# Patient Record
Sex: MALE | Race: WHITE | Employment: FULL TIME | URBAN - NONMETROPOLITAN AREA
[De-identification: names, ages, dates, MRNs, and addresses within clinical notes are randomized per-mention and may not be internally consistent; named-entity substitution may affect disease eponyms.]

---

## 2017-02-06 DIAGNOSIS — I10 ESSENTIAL HYPERTENSION: ICD-10-CM

## 2017-02-06 RX ORDER — OLMESARTAN MEDOXOMIL 40 MG/1
40 TABLET ORAL DAILY
Qty: 90 TABLET | Refills: 0 | Status: SHIPPED | OUTPATIENT
Start: 2017-02-06 | End: 2017-02-16 | Stop reason: SDUPTHER

## 2017-02-06 RX ORDER — AMLODIPINE BESYLATE 5 MG/1
5 TABLET ORAL DAILY
Qty: 90 TABLET | Refills: 0 | Status: SHIPPED | OUTPATIENT
Start: 2017-02-06 | End: 2017-02-16 | Stop reason: SDUPTHER

## 2017-02-06 RX ORDER — CHLORTHALIDONE 25 MG/1
TABLET ORAL
Qty: 45 TABLET | Refills: 0 | Status: SHIPPED | OUTPATIENT
Start: 2017-02-06 | End: 2017-02-16 | Stop reason: SDUPTHER

## 2017-02-16 ENCOUNTER — OFFICE VISIT (OUTPATIENT)
Dept: FAMILY MEDICINE CLINIC | Age: 57
End: 2017-02-16
Payer: COMMERCIAL

## 2017-02-16 VITALS
DIASTOLIC BLOOD PRESSURE: 78 MMHG | RESPIRATION RATE: 16 BRPM | SYSTOLIC BLOOD PRESSURE: 128 MMHG | OXYGEN SATURATION: 98 % | TEMPERATURE: 99 F | WEIGHT: 206 LBS | HEART RATE: 86 BPM | BODY MASS INDEX: 33.11 KG/M2 | HEIGHT: 66 IN

## 2017-02-16 DIAGNOSIS — N52.1 ERECTILE DYSFUNCTION DUE TO DISEASES CLASSIFIED ELSEWHERE: ICD-10-CM

## 2017-02-16 DIAGNOSIS — E78.2 MIXED HYPERLIPIDEMIA: ICD-10-CM

## 2017-02-16 DIAGNOSIS — Z12.5 PROSTATE CANCER SCREENING: ICD-10-CM

## 2017-02-16 DIAGNOSIS — Z28.21 REFUSED PNEUMOCOCCAL VACCINATION: ICD-10-CM

## 2017-02-16 DIAGNOSIS — Z72.0 TOBACCO ABUSE: ICD-10-CM

## 2017-02-16 DIAGNOSIS — I10 ESSENTIAL HYPERTENSION: ICD-10-CM

## 2017-02-16 DIAGNOSIS — Z28.21 REFUSED INFLUENZA VACCINE: ICD-10-CM

## 2017-02-16 DIAGNOSIS — E78.2 MIXED HYPERLIPIDEMIA: Primary | ICD-10-CM

## 2017-02-16 DIAGNOSIS — K21.00 GASTROESOPHAGEAL REFLUX DISEASE WITH ESOPHAGITIS: ICD-10-CM

## 2017-02-16 DIAGNOSIS — Z13.9 SCREENING: ICD-10-CM

## 2017-02-16 LAB
ALBUMIN SERPL-MCNC: 4.3 G/DL (ref 3.5–5.2)
ALP BLD-CCNC: 88 U/L (ref 40–130)
ALT SERPL-CCNC: 27 U/L (ref 5–41)
ANION GAP SERPL CALCULATED.3IONS-SCNC: 15 MMOL/L (ref 7–19)
AST SERPL-CCNC: 21 U/L (ref 5–40)
BASOPHILS ABSOLUTE: 0.1 K/UL (ref 0–0.2)
BASOPHILS RELATIVE PERCENT: 0.9 % (ref 0–1)
BILIRUB SERPL-MCNC: 0.3 MG/DL (ref 0.2–1.2)
BILIRUBIN URINE: NEGATIVE
BLOOD, URINE: NEGATIVE
BUN BLDV-MCNC: 14 MG/DL (ref 6–20)
CALCIUM SERPL-MCNC: 9.7 MG/DL (ref 8.6–10)
CHLORIDE BLD-SCNC: 103 MMOL/L (ref 98–111)
CHOLESTEROL, TOTAL: 182 MG/DL (ref 160–199)
CLARITY: CLEAR
CO2: 26 MMOL/L (ref 22–29)
COLOR: YELLOW
CREAT SERPL-MCNC: 0.8 MG/DL (ref 0.5–1.2)
EOSINOPHILS ABSOLUTE: 0.3 K/UL (ref 0–0.6)
EOSINOPHILS RELATIVE PERCENT: 3.8 % (ref 0–5)
GFR NON-AFRICAN AMERICAN: >60
GLOBULIN: 2.6 G/DL
GLUCOSE BLD-MCNC: 94 MG/DL (ref 74–109)
GLUCOSE URINE: NEGATIVE MG/DL
HCT VFR BLD CALC: 45.3 % (ref 42–52)
HDLC SERPL-MCNC: 38 MG/DL (ref 55–121)
HEMOGLOBIN: 15.7 G/DL (ref 14–18)
KETONES, URINE: NEGATIVE MG/DL
LDL CHOLESTEROL CALCULATED: 119 MG/DL
LEUKOCYTE ESTERASE, URINE: NEGATIVE
LYMPHOCYTES ABSOLUTE: 3 K/UL (ref 1.1–4.5)
LYMPHOCYTES RELATIVE PERCENT: 33.6 % (ref 20–40)
MCH RBC QN AUTO: 32.3 PG (ref 27–31)
MCHC RBC AUTO-ENTMCNC: 34.7 G/DL (ref 33–37)
MCV RBC AUTO: 93.2 FL (ref 80–94)
MONOCYTES ABSOLUTE: 0.7 K/UL (ref 0–0.9)
MONOCYTES RELATIVE PERCENT: 7.4 % (ref 0–10)
NEUTROPHILS ABSOLUTE: 4.9 K/UL (ref 1.5–7.5)
NEUTROPHILS RELATIVE PERCENT: 54.3 % (ref 50–65)
NITRITE, URINE: NEGATIVE
PDW BLD-RTO: 13.2 % (ref 11.5–14.5)
PH UA: 6
PLATELET # BLD: 204 K/UL (ref 130–400)
PMV BLD AUTO: 11.6 FL (ref 7.4–10.4)
POTASSIUM SERPL-SCNC: 3.8 MMOL/L (ref 3.5–5)
PROTEIN UA: NEGATIVE MG/DL
RBC # BLD: 4.86 M/UL (ref 4.7–6.1)
SODIUM BLD-SCNC: 144 MMOL/L (ref 136–145)
SPECIFIC GRAVITY UA: 1.02
TOTAL PROTEIN: 6.9 G/DL (ref 6.6–8.7)
TRIGL SERPL-MCNC: 123 MG/DL (ref 150–199)
TSH SERPL DL<=0.05 MIU/L-ACNC: 1.03 UIU/ML (ref 0.27–4.2)
UROBILINOGEN, URINE: 0.2 E.U./DL
WBC # BLD: 9 K/UL (ref 4.8–10.8)

## 2017-02-16 PROCEDURE — 99214 OFFICE O/P EST MOD 30 MIN: CPT | Performed by: NURSE PRACTITIONER

## 2017-02-16 RX ORDER — CHLORTHALIDONE 25 MG/1
TABLET ORAL
Qty: 45 TABLET | Refills: 1 | Status: SHIPPED | OUTPATIENT
Start: 2017-02-16 | End: 2017-09-11 | Stop reason: SDUPTHER

## 2017-02-16 RX ORDER — VARENICLINE TARTRATE 25 MG
KIT ORAL
Qty: 1 EACH | Refills: 0 | Status: SHIPPED | OUTPATIENT
Start: 2017-02-16 | End: 2017-09-11

## 2017-02-16 RX ORDER — VARENICLINE TARTRATE 1 MG/1
1 TABLET, FILM COATED ORAL 2 TIMES DAILY
Qty: 60 TABLET | Refills: 3 | Status: SHIPPED | OUTPATIENT
Start: 2017-02-16 | End: 2017-09-11

## 2017-02-16 RX ORDER — OMEPRAZOLE 20 MG/1
20 CAPSULE, DELAYED RELEASE ORAL
Qty: 90 CAPSULE | Refills: 1 | Status: SHIPPED | OUTPATIENT
Start: 2017-02-16 | End: 2017-09-11 | Stop reason: SDUPTHER

## 2017-02-16 RX ORDER — TADALAFIL 20 MG/1
TABLET ORAL
Qty: 10 TABLET | Refills: 3 | Status: SHIPPED | OUTPATIENT
Start: 2017-02-16 | End: 2017-09-11

## 2017-02-16 RX ORDER — OLMESARTAN MEDOXOMIL 40 MG/1
40 TABLET ORAL DAILY
Qty: 90 TABLET | Refills: 1 | Status: SHIPPED | OUTPATIENT
Start: 2017-02-16 | End: 2017-09-11 | Stop reason: SDUPTHER

## 2017-02-16 RX ORDER — AMLODIPINE BESYLATE 5 MG/1
5 TABLET ORAL DAILY
Qty: 90 TABLET | Refills: 1 | Status: SHIPPED | OUTPATIENT
Start: 2017-02-16 | End: 2017-09-11 | Stop reason: SDUPTHER

## 2017-02-16 ASSESSMENT — ENCOUNTER SYMPTOMS
COUGH: 0
SHORTNESS OF BREATH: 0

## 2017-02-22 DIAGNOSIS — Z12.5 PROSTATE CANCER SCREENING: Primary | ICD-10-CM

## 2017-02-23 DIAGNOSIS — Z12.5 PROSTATE CANCER SCREENING: ICD-10-CM

## 2017-02-23 LAB — PROSTATE SPECIFIC ANTIGEN: 1.05 NG/ML (ref 0–4)

## 2017-03-06 ENCOUNTER — TELEPHONE (OUTPATIENT)
Dept: GASTROENTEROLOGY | Age: 57
End: 2017-03-06

## 2017-05-04 DIAGNOSIS — I10 ESSENTIAL HYPERTENSION: ICD-10-CM

## 2017-05-04 RX ORDER — CHLORTHALIDONE 25 MG/1
TABLET ORAL
Qty: 45 TABLET | Refills: 0 | Status: SHIPPED | OUTPATIENT
Start: 2017-05-04 | End: 2017-09-11 | Stop reason: SDUPTHER

## 2017-06-19 ENCOUNTER — ANESTHESIA (OUTPATIENT)
Dept: ENDOSCOPY | Age: 57
End: 2017-06-19
Payer: COMMERCIAL

## 2017-06-19 ENCOUNTER — HOSPITAL ENCOUNTER (OUTPATIENT)
Age: 57
Setting detail: OUTPATIENT SURGERY
Discharge: HOME | End: 2017-06-19
Attending: INTERNAL MEDICINE | Admitting: INTERNAL MEDICINE
Payer: COMMERCIAL

## 2017-06-19 ENCOUNTER — ANESTHESIA EVENT (OUTPATIENT)
Dept: ENDOSCOPY | Age: 57
End: 2017-06-19
Payer: COMMERCIAL

## 2017-06-19 VITALS
RESPIRATION RATE: 14 BRPM | TEMPERATURE: 98.3 F | DIASTOLIC BLOOD PRESSURE: 79 MMHG | OXYGEN SATURATION: 93 % | SYSTOLIC BLOOD PRESSURE: 128 MMHG | HEART RATE: 76 BPM

## 2017-06-19 VITALS
OXYGEN SATURATION: 93 % | DIASTOLIC BLOOD PRESSURE: 67 MMHG | RESPIRATION RATE: 15 BRPM | SYSTOLIC BLOOD PRESSURE: 104 MMHG

## 2017-06-19 PROCEDURE — 7100000010 HC PHASE II RECOVERY - FIRST 15 MIN: Performed by: INTERNAL MEDICINE

## 2017-06-19 PROCEDURE — 3700000001 HC ADD 15 MINUTES (ANESTHESIA): Performed by: INTERNAL MEDICINE

## 2017-06-19 PROCEDURE — 2580000003 HC RX 258: Performed by: INTERNAL MEDICINE

## 2017-06-19 PROCEDURE — 6360000002 HC RX W HCPCS: Performed by: NURSE ANESTHETIST, CERTIFIED REGISTERED

## 2017-06-19 PROCEDURE — 7100000011 HC PHASE II RECOVERY - ADDTL 15 MIN: Performed by: INTERNAL MEDICINE

## 2017-06-19 PROCEDURE — 88305 TISSUE EXAM BY PATHOLOGIST: CPT

## 2017-06-19 PROCEDURE — 2500000003 HC RX 250 WO HCPCS: Performed by: NURSE ANESTHETIST, CERTIFIED REGISTERED

## 2017-06-19 PROCEDURE — 3609010300 HC COLONOSCOPY W/BIOPSY SINGLE/MULTIPLE: Performed by: INTERNAL MEDICINE

## 2017-06-19 PROCEDURE — 3700000000 HC ANESTHESIA ATTENDED CARE: Performed by: INTERNAL MEDICINE

## 2017-06-19 PROCEDURE — 45380 COLONOSCOPY AND BIOPSY: CPT | Performed by: INTERNAL MEDICINE

## 2017-06-19 RX ORDER — LIDOCAINE HYDROCHLORIDE 10 MG/ML
5 INJECTION, SOLUTION EPIDURAL; INFILTRATION; INTRACAUDAL; PERINEURAL ONCE
Status: DISCONTINUED | OUTPATIENT
Start: 2017-06-19 | End: 2017-06-19 | Stop reason: HOSPADM

## 2017-06-19 RX ORDER — LIDOCAINE HYDROCHLORIDE 20 MG/ML
INJECTION, SOLUTION INFILTRATION; PERINEURAL PRN
Status: DISCONTINUED | OUTPATIENT
Start: 2017-06-19 | End: 2017-06-19 | Stop reason: SDUPTHER

## 2017-06-19 RX ORDER — PROPOFOL 10 MG/ML
INJECTION, EMULSION INTRAVENOUS PRN
Status: DISCONTINUED | OUTPATIENT
Start: 2017-06-19 | End: 2017-06-19 | Stop reason: SDUPTHER

## 2017-06-19 RX ORDER — PROMETHAZINE HYDROCHLORIDE 25 MG/ML
6.25 INJECTION, SOLUTION INTRAMUSCULAR; INTRAVENOUS
Status: DISCONTINUED | OUTPATIENT
Start: 2017-06-19 | End: 2017-06-19 | Stop reason: HOSPADM

## 2017-06-19 RX ORDER — SODIUM CHLORIDE 9 MG/ML
INJECTION, SOLUTION INTRAVENOUS CONTINUOUS
Status: DISCONTINUED | OUTPATIENT
Start: 2017-06-19 | End: 2017-06-19 | Stop reason: HOSPADM

## 2017-06-19 RX ORDER — MIDAZOLAM HYDROCHLORIDE 1 MG/ML
INJECTION INTRAMUSCULAR; INTRAVENOUS PRN
Status: DISCONTINUED | OUTPATIENT
Start: 2017-06-19 | End: 2017-06-19 | Stop reason: SDUPTHER

## 2017-06-19 RX ORDER — FENTANYL CITRATE 50 UG/ML
INJECTION, SOLUTION INTRAMUSCULAR; INTRAVENOUS PRN
Status: DISCONTINUED | OUTPATIENT
Start: 2017-06-19 | End: 2017-06-19 | Stop reason: SDUPTHER

## 2017-06-19 RX ORDER — DIPHENHYDRAMINE HYDROCHLORIDE 50 MG/ML
12.5 INJECTION INTRAMUSCULAR; INTRAVENOUS
Status: DISCONTINUED | OUTPATIENT
Start: 2017-06-19 | End: 2017-06-19 | Stop reason: HOSPADM

## 2017-06-19 RX ORDER — ONDANSETRON 2 MG/ML
4 INJECTION INTRAMUSCULAR; INTRAVENOUS
Status: DISCONTINUED | OUTPATIENT
Start: 2017-06-19 | End: 2017-06-19 | Stop reason: HOSPADM

## 2017-06-19 RX ADMIN — SODIUM CHLORIDE: 9 INJECTION, SOLUTION INTRAVENOUS at 10:34

## 2017-06-19 RX ADMIN — PROPOFOL 200 MG: 10 INJECTION, EMULSION INTRAVENOUS at 11:36

## 2017-06-19 RX ADMIN — LIDOCAINE HYDROCHLORIDE 40 MG: 20 INJECTION, SOLUTION INFILTRATION; PERINEURAL at 11:36

## 2017-06-19 RX ADMIN — FENTANYL CITRATE 50 MCG: 50 INJECTION INTRAMUSCULAR; INTRAVENOUS at 11:36

## 2017-06-19 RX ADMIN — MIDAZOLAM HYDROCHLORIDE 2 MG: 1 INJECTION, SOLUTION INTRAMUSCULAR; INTRAVENOUS at 11:36

## 2017-06-19 ASSESSMENT — ENCOUNTER SYMPTOMS: SHORTNESS OF BREATH: 1

## 2017-09-11 ENCOUNTER — OFFICE VISIT (OUTPATIENT)
Dept: FAMILY MEDICINE CLINIC | Age: 57
End: 2017-09-11
Payer: COMMERCIAL

## 2017-09-11 VITALS
WEIGHT: 211 LBS | HEIGHT: 66 IN | OXYGEN SATURATION: 98 % | DIASTOLIC BLOOD PRESSURE: 80 MMHG | SYSTOLIC BLOOD PRESSURE: 130 MMHG | HEART RATE: 73 BPM | RESPIRATION RATE: 18 BRPM | BODY MASS INDEX: 33.91 KG/M2 | TEMPERATURE: 98 F

## 2017-09-11 DIAGNOSIS — I10 ESSENTIAL HYPERTENSION: ICD-10-CM

## 2017-09-11 DIAGNOSIS — E78.2 MIXED HYPERLIPIDEMIA: ICD-10-CM

## 2017-09-11 DIAGNOSIS — K21.00 GASTROESOPHAGEAL REFLUX DISEASE WITH ESOPHAGITIS: ICD-10-CM

## 2017-09-11 LAB
ALBUMIN SERPL-MCNC: 4.3 G/DL (ref 3.5–5.2)
ALP BLD-CCNC: 91 U/L (ref 40–130)
ALT SERPL-CCNC: 28 U/L (ref 5–41)
ANION GAP SERPL CALCULATED.3IONS-SCNC: 14 MMOL/L (ref 7–19)
AST SERPL-CCNC: 21 U/L (ref 5–40)
BASOPHILS ABSOLUTE: 0.1 K/UL (ref 0–0.2)
BASOPHILS RELATIVE PERCENT: 1 % (ref 0–1)
BILIRUB SERPL-MCNC: 0.5 MG/DL (ref 0.2–1.2)
BILIRUBIN URINE: NEGATIVE
BLOOD, URINE: NEGATIVE
BUN BLDV-MCNC: 19 MG/DL (ref 6–20)
CALCIUM SERPL-MCNC: 10 MG/DL (ref 8.6–10)
CHLORIDE BLD-SCNC: 103 MMOL/L (ref 98–111)
CHOLESTEROL, TOTAL: 131 MG/DL (ref 160–199)
CLARITY: CLEAR
CO2: 27 MMOL/L (ref 22–29)
COLOR: YELLOW
CREAT SERPL-MCNC: 0.8 MG/DL (ref 0.5–1.2)
EOSINOPHILS ABSOLUTE: 0.3 K/UL (ref 0–0.6)
EOSINOPHILS RELATIVE PERCENT: 3.4 % (ref 0–5)
GFR NON-AFRICAN AMERICAN: >60
GLUCOSE BLD-MCNC: 98 MG/DL (ref 74–109)
GLUCOSE URINE: NEGATIVE MG/DL
HCT VFR BLD CALC: 46.2 % (ref 42–52)
HDLC SERPL-MCNC: 41 MG/DL (ref 55–121)
HEMOGLOBIN: 16.2 G/DL (ref 14–18)
KETONES, URINE: NEGATIVE MG/DL
LDL CHOLESTEROL CALCULATED: 75 MG/DL
LEUKOCYTE ESTERASE, URINE: NEGATIVE
LYMPHOCYTES ABSOLUTE: 3.2 K/UL (ref 1.1–4.5)
LYMPHOCYTES RELATIVE PERCENT: 33.5 % (ref 20–40)
MCH RBC QN AUTO: 33.1 PG (ref 27–31)
MCHC RBC AUTO-ENTMCNC: 35.1 G/DL (ref 33–37)
MCV RBC AUTO: 94.3 FL (ref 80–94)
MONOCYTES ABSOLUTE: 0.7 K/UL (ref 0–0.9)
MONOCYTES RELATIVE PERCENT: 7.6 % (ref 0–10)
NEUTROPHILS ABSOLUTE: 5.2 K/UL (ref 1.5–7.5)
NEUTROPHILS RELATIVE PERCENT: 54.2 % (ref 50–65)
NITRITE, URINE: NEGATIVE
PDW BLD-RTO: 13.3 % (ref 11.5–14.5)
PH UA: 7.5
PLATELET # BLD: 189 K/UL (ref 130–400)
PMV BLD AUTO: 10.7 FL (ref 9.4–12.4)
POTASSIUM SERPL-SCNC: 4.2 MMOL/L (ref 3.5–5)
PROTEIN UA: NEGATIVE MG/DL
RBC # BLD: 4.9 M/UL (ref 4.7–6.1)
SODIUM BLD-SCNC: 144 MMOL/L (ref 136–145)
SPECIFIC GRAVITY UA: 1.02
TOTAL PROTEIN: 7.6 G/DL (ref 6.6–8.7)
TRIGL SERPL-MCNC: 76 MG/DL (ref 150–199)
TSH SERPL DL<=0.05 MIU/L-ACNC: 0.92 UIU/ML (ref 0.27–4.2)
UROBILINOGEN, URINE: 1 E.U./DL
WBC # BLD: 9.6 K/UL (ref 4.8–10.8)

## 2017-09-11 PROCEDURE — 99214 OFFICE O/P EST MOD 30 MIN: CPT | Performed by: NURSE PRACTITIONER

## 2017-09-11 RX ORDER — AMLODIPINE BESYLATE 5 MG/1
5 TABLET ORAL DAILY
Qty: 90 TABLET | Refills: 1 | Status: SHIPPED | OUTPATIENT
Start: 2017-09-11 | End: 2018-02-09 | Stop reason: SDUPTHER

## 2017-09-11 RX ORDER — OLMESARTAN MEDOXOMIL 40 MG/1
40 TABLET ORAL DAILY
Qty: 90 TABLET | Refills: 1 | Status: SHIPPED | OUTPATIENT
Start: 2017-09-11 | End: 2018-02-09 | Stop reason: SDUPTHER

## 2017-09-11 RX ORDER — OMEPRAZOLE 20 MG/1
20 CAPSULE, DELAYED RELEASE ORAL
Qty: 90 CAPSULE | Refills: 1 | Status: SHIPPED | OUTPATIENT
Start: 2017-09-11 | End: 2018-02-09 | Stop reason: SDUPTHER

## 2017-09-11 RX ORDER — CHLORTHALIDONE 25 MG/1
TABLET ORAL
Qty: 45 TABLET | Refills: 1 | Status: SHIPPED | OUTPATIENT
Start: 2017-09-11 | End: 2018-02-09 | Stop reason: SDUPTHER

## 2017-09-11 RX ORDER — ATORVASTATIN CALCIUM 40 MG/1
TABLET, FILM COATED ORAL
Qty: 90 TABLET | Refills: 1 | Status: SHIPPED | OUTPATIENT
Start: 2017-09-11 | End: 2018-02-09 | Stop reason: SDUPTHER

## 2017-09-11 ASSESSMENT — ENCOUNTER SYMPTOMS
DIARRHEA: 0
CONSTIPATION: 0
SHORTNESS OF BREATH: 0

## 2017-10-03 ENCOUNTER — OFFICE VISIT (OUTPATIENT)
Dept: FAMILY MEDICINE CLINIC | Age: 57
End: 2017-10-03
Payer: COMMERCIAL

## 2017-10-03 VITALS
DIASTOLIC BLOOD PRESSURE: 74 MMHG | SYSTOLIC BLOOD PRESSURE: 130 MMHG | HEART RATE: 88 BPM | OXYGEN SATURATION: 98 % | TEMPERATURE: 97.9 F | RESPIRATION RATE: 16 BRPM

## 2017-10-03 DIAGNOSIS — M54.41 ACUTE RIGHT-SIDED LOW BACK PAIN WITH RIGHT-SIDED SCIATICA: Primary | ICD-10-CM

## 2017-10-03 PROCEDURE — 99213 OFFICE O/P EST LOW 20 MIN: CPT | Performed by: NURSE PRACTITIONER

## 2017-10-03 PROCEDURE — 96372 THER/PROPH/DIAG INJ SC/IM: CPT | Performed by: NURSE PRACTITIONER

## 2017-10-03 RX ORDER — METHYLPREDNISOLONE 4 MG/1
TABLET ORAL
Qty: 1 KIT | Refills: 0 | Status: SHIPPED | OUTPATIENT
Start: 2017-10-03 | End: 2017-10-06 | Stop reason: ALTCHOICE

## 2017-10-03 RX ORDER — DEXAMETHASONE SODIUM PHOSPHATE 4 MG/ML
2 INJECTION, SOLUTION INTRA-ARTICULAR; INTRALESIONAL; INTRAMUSCULAR; INTRAVENOUS; SOFT TISSUE ONCE
Status: COMPLETED | OUTPATIENT
Start: 2017-10-03 | End: 2017-10-03

## 2017-10-03 RX ORDER — HYDROCODONE BITARTRATE AND ACETAMINOPHEN 7.5; 325 MG/1; MG/1
1 TABLET ORAL EVERY 6 HOURS PRN
Qty: 30 TABLET | Refills: 0 | Status: SHIPPED | OUTPATIENT
Start: 2017-10-03 | End: 2017-10-06

## 2017-10-03 RX ORDER — IBUPROFEN 400 MG/1
400 TABLET ORAL EVERY 6 HOURS PRN
COMMUNITY
End: 2017-10-06 | Stop reason: ALTCHOICE

## 2017-10-03 RX ORDER — TRIAMCINOLONE ACETONIDE 40 MG/ML
80 INJECTION, SUSPENSION INTRA-ARTICULAR; INTRAMUSCULAR ONCE
Status: COMPLETED | OUTPATIENT
Start: 2017-10-03 | End: 2017-10-03

## 2017-10-03 RX ORDER — CYCLOBENZAPRINE HCL 10 MG
10 TABLET ORAL 3 TIMES DAILY PRN
Qty: 60 TABLET | Refills: 1 | Status: SHIPPED | OUTPATIENT
Start: 2017-10-03 | End: 2017-10-13

## 2017-10-03 RX ORDER — NABUMETONE 500 MG/1
500 TABLET, FILM COATED ORAL 2 TIMES DAILY
Qty: 60 TABLET | Refills: 3 | Status: SHIPPED | OUTPATIENT
Start: 2017-10-03 | End: 2017-11-02

## 2017-10-03 RX ORDER — CYCLOBENZAPRINE HCL 10 MG
10 TABLET ORAL 3 TIMES DAILY PRN
COMMUNITY
End: 2017-10-06 | Stop reason: SDUPTHER

## 2017-10-03 RX ADMIN — TRIAMCINOLONE ACETONIDE 80 MG: 40 INJECTION, SUSPENSION INTRA-ARTICULAR; INTRAMUSCULAR at 09:25

## 2017-10-03 RX ADMIN — DEXAMETHASONE SODIUM PHOSPHATE 2 MG: 4 INJECTION, SOLUTION INTRA-ARTICULAR; INTRALESIONAL; INTRAMUSCULAR; INTRAVENOUS; SOFT TISSUE at 09:24

## 2017-10-03 ASSESSMENT — ENCOUNTER SYMPTOMS
BACK PAIN: 1
DIARRHEA: 0

## 2017-10-03 NOTE — PATIENT INSTRUCTIONS
between the ice pack and your skin. · Take pain medicines exactly as directed. ¨ If the doctor gave you a prescription medicine for pain, take it as prescribed. ¨ If you are not taking a prescription pain medicine, ask your doctor if you can take an over-the-counter medicine. · Take short walks several times a day. You can start with 5 to 10 minutes, 3 or 4 times a day, and work up to longer walks. Walk on level surfaces and avoid hills and stairs until your back is better. · Return to work and other activities as soon as you can. Continued rest without activity is usually not good for your back. · To prevent future back pain, do exercises to stretch and strengthen your back and stomach. Learn how to use good posture, safe lifting techniques, and proper body mechanics. When should you call for help? Call your doctor now or seek immediate medical care if:  · You have new or worsening numbness in your legs. · You have new or worsening weakness in your legs. (This could make it hard to stand up.)  · You lose control of your bladder or bowels. Watch closely for changes in your health, and be sure to contact your doctor if:  · Your pain gets worse. · You are not getting better after 2 weeks. Where can you learn more? Go to https://[employer].EnteroMedics. org and sign in to your [employer] account. Enter Y218 in the [employer] box to learn more about \"Back Pain: Care Instructions. \"     If you do not have an account, please click on the \"Sign Up Now\" link. Current as of: March 21, 2017  Content Version: 11.3  © 7280-0398 [employer]. Care instructions adapted under license by [facility] ([facility]). If you have questions about a medical condition or this instruction, always ask your healthcare professional. [place] [address] any warranty or liability for your use of this information.        Low Back Pain: Exercises  Your Care Instructions  Here are some examples of typical rehabilitation exercises for your condition. Start each exercise slowly. Ease off the exercise if you start to have pain. Your doctor or physical therapist will tell you when you can start these exercises and which ones will work best for you. How to do the exercises  Press-up    1. Lie on your stomach, supporting your body with your forearms. 2. Press your elbows down into the floor to raise your upper back. As you do this, relax your stomach muscles and allow your back to arch without using your back muscles. As your press up, do not let your hips or pelvis come off the floor. 3. Hold for 15 to 30 seconds, then relax. 4. Repeat 2 to 4 times. Alternate arm and leg (bird dog) exercise    Note: Do this exercise slowly. Try to keep your body straight at all times, and do not let one hip drop lower than the other. 1. Start on the floor, on your hands and knees. 2. Tighten your belly muscles. 3. Raise one leg off the floor, and hold it straight out behind you. Be careful not to let your hip drop down, because that will twist your trunk. 4. Hold for about 6 seconds, then lower your leg and switch to the other leg. 5. Repeat 8 to 12 times on each leg. 6. Over time, work up to holding for 10 to 30 seconds each time. 7. If you feel stable and secure with your leg raised, try raising the opposite arm straight out in front of you at the same time. Knee-to-chest exercise    1. Lie on your back with your knees bent and your feet flat on the floor. 2. Bring one knee to your chest, keeping the other foot flat on the floor (or keeping the other leg straight, whichever feels better on your lower back). 3. Keep your lower back pressed to the floor. Hold for at least 15 to 30 seconds. 4. Relax, and lower the knee to the starting position. 5. Repeat with the other leg. Repeat 2 to 4 times with each leg.   6. To get more stretch, put your other leg flat on the floor while pulling your knee to your chest.  Curl-ups    1. Lie on the floor on your back with your knees bent at a 90-degree angle. Your feet should be flat on the floor, about 12 inches from your buttocks. 2. Cross your arms over your chest. If this bothers your neck, try putting your hands behind your neck (not your head), with your elbows spread apart. 3. Slowly tighten your belly muscles and raise your shoulder blades off the floor. 4. Keep your head in line with your body, and do not press your chin to your chest.  5. Hold this position for 1 or 2 seconds, then slowly lower yourself back down to the floor. 6. Repeat 8 to 12 times. Pelvic tilt exercise    1. Lie on your back with your knees bent. 2. \"Brace\" your stomach. This means to tighten your muscles by pulling in and imagining your belly button moving toward your spine. You should feel like your back is pressing to the floor and your hips and pelvis are rocking back. 3. Hold for about 6 seconds while you breathe smoothly. 4. Repeat 8 to 12 times. Heel dig bridging    1. Lie on your back with both knees bent and your ankles bent so that only your heels are digging into the floor. Your knees should be bent about 90 degrees. 2. Then push your heels into the floor, squeeze your buttocks, and lift your hips off the floor until your shoulders, hips, and knees are all in a straight line. 3. Hold for about 6 seconds as you continue to breathe normally, and then slowly lower your hips back down to the floor and rest for up to 10 seconds. 4. Do 8 to 12 repetitions. Hamstring stretch in doorway    1. Lie on your back in a doorway, with one leg through the open door. 2. Slide your leg up the wall to straighten your knee. You should feel a gentle stretch down the back of your leg. 3. Hold the stretch for at least 15 to 30 seconds. Do not arch your back, point your toes, or bend either knee. Keep one heel touching the floor and the other heel touching the wall.   4. Repeat with your other leg.  5. Do 2 to 4 times for each leg. Hip flexor stretch    1. Kneel on the floor with one knee bent and one leg behind you. Place your forward knee over your foot. Keep your other knee touching the floor. 2. Slowly push your hips forward until you feel a stretch in the upper thigh of your rear leg. 3. Hold the stretch for at least 15 to 30 seconds. Repeat with your other leg. 4. Do 2 to 4 times on each side. Wall sit    1. Stand with your back 10 to 12 inches away from a wall. 2. Lean into the wall until your back is flat against it. 3. Slowly slide down until your knees are slightly bent, pressing your lower back into the wall. 4. Hold for about 6 seconds, then slide back up the wall. 5. Repeat 8 to 12 times. Follow-up care is a key part of your treatment and safety. Be sure to make and go to all appointments, and call your doctor if you are having problems. It's also a good idea to know your test results and keep a list of the medicines you take. Where can you learn more? Go to https://[employer] / cTrader.[employer]. org and sign in to your [employer] account. Enter T023 in the [facility] box to learn more about \"Low Back Pain: Exercises. \"     If you do not have an account, please click on the \"Sign Up Now\" link. Current as of: March 21, 2017  Content Version: 11.3  © 1557-5900 [employer]. Care instructions adapted under license by [facility] ([facility]). If you have questions about a medical condition or this instruction, always ask your healthcare professional. [address] any warranty or liability for your use of this information. Acute Low Back Pain: Exercises  Your Care Instructions  Here are some examples of typical rehabilitation exercises for your condition. Start each exercise slowly. Ease off the exercise if you start to have pain.   Your doctor or physical therapist will tell you when you can start these exercises and which ones will work best for for your use of this information. Opaddx-ym-Zcsf Plan for People With Low Back Pain: Care Instructions  Your Care Instructions  You may be worried about going back to work. Once you have had low back pain, you may be afraid that the pain will come back. This fear may cause you to limit your activities. Low back pain can be acute or chronic. If it has lasted less than 3 months, it is acute. It is chronic if it has lasted more than 3 months. Staying active while protecting your back may help keep your pain from becoming chronic. If your work involves a lot of sitting, standing, or lifting, you may need to change the way you do your job. But getting back to work and other activities may actually help you get better. This is because movement keeps your back flexible and the muscles strong, and staying in bed or avoiding activity for more than a day or two can actually make your pain worse. You will probably feel better being back in your normal routine. A positive outlook can help speed your recovery. Follow-up care is a key part of your treatment and safety. Be sure to make and go to all appointments, and call your doctor if you are having problems. It's also a good idea to know your test results and keep a list of the medicines you take. How can you care for yourself at home? Self-care strategies  · Pay attention to your posture. In many cases, low back pain is the result of bad posture. ¨ Stand or sit tall, with your shoulders back and your stomach pulled in to support your back. Your ears and shoulders should be lined up over your hips. ¨ Anytime you begin to feel pain in your back, check your posture. You may be able to fix the problem by paying attention to how you are sitting or standing. · Get some exercise every day. Exercise may not only help treat low back pain, but it may also help keep your back from hurting again.   ¨ Each day, try to do some stretching and some exercises to strengthen your stomach, back, and legs. ¨ You should also do daily exercises that get your heart rate up, such as walking, swimming, or biking. · Do not smoke. Smoking decreases blood flow and slows healing. If you need help quitting, talk to your doctor about stop-smoking programs and medicines. These can increase your chances of quitting for good. · Take pain medicines exactly as directed. ¨ If the doctor gave you a prescription medicine for pain, take it as prescribed. ¨ If you are not taking a prescription pain medicine, ask your doctor if you can take an over-the-counter medicine. Make changes at work  Talk to your supervisor or human resources department. They may have good ideas on how you can protect your back at work. Some companies have experts who can suggest different tools or ways to do your job. · You may need to develop a gradual hymxpu-bg-djqz plan. Be honest about what you feel you can and cannot do. If you need it, your doctor can give you a prescription for shorter work days or fewer duties. · If you know parts of your job are putting stress on your back, ask if there are other ways you can do those jobs. Or ask if someone else could take over that work. · If your job involves a lot of sitting, you may be able to get a better chair. Chairs that are adjustable or that have lumbar supports may help you. Ergonomics means matching the human body to the job. By studying your work environment and the tools you use, you can reduce your chances of back pain. · You are more likely to have low back pain if you work intensely for long periods of time without breaks. Take regular breaks and do stretching exercises to reduce this risk. Try to take 3- to 5-minute breaks, or change tasks, every 20 to 40 minutes. · If your job involves a lot of sittin [facility] a small pillow, a rolled-up towel, or a lumbar roll in the curve of your back for extra support.   ¨ Sit in a chair that is low enough that you can place both feet flat on the floor. Your knees should be slightly lower than your hips when you are sitting. ¨ Try a kneeling chair or an exercise ball. A kneeling chair tilts your hips forward. This takes pressure off your lower back. An exercise ball can rock from side to side, which helps keep your back loose. · If your job involves lifting:  ¨ Hold the object close to you. MA[facility] your knees and keep your back straight as you grasp the object, then straighten your knees to lift it up. Try to avoid twisting. ¨ Set down your load carefully, squatting with your knees and hips only. · When you lift:  ¨ Do not try to lift something by yourself that is too heavy or too awkward to carry, or that will not allow you to see where you are walking. ¨ Do not rely on a \"back belt\" to protect your back. Studies have not shown them to be effective. · You may be able to get more information on ergonomics from your state Department of Labor, the Occupational Safety and Health Administration (OSHA), or the [employer] Data for [employer] and Safety ([employer]). Where can you learn more? Go to https://HelloWalletpePA [employer] [facility].[employer]ermillion. org and sign in to your [employer] account. Enter N045 in the [facility] box to learn more about \"Eaytji-wl-Qtgd Plan for People With Low Back Pain: Care Instructions. \"     If you do not have an account, please click on the \"Sign Up Now\" link. Current as of: March 21, 2017  Content Version: 11.3  © 5623-4171 [employer]. Care instructions adapted under license by [facility] ([facility]). If you have questions about a medical condition or this instruction, always ask your healthcare professional. [place] [address] any warranty or liability for your use of this information.

## 2017-10-03 NOTE — PROGRESS NOTES
spine and radiates to right lower leg into right ankle. Neurological: He is alert and oriented to person, place, and time. Skin: Skin is warm and dry. Psychiatric: His behavior is normal. Thought content normal.   Nursing note and vitals reviewed. /74 (Site: Right Arm, Position: Sitting, Cuff Size: Large Adult)  Pulse 88  Temp 97.9 °F (36.6 °C) (Oral)   Resp 16  SpO2 98%    Assessment:      1. Acute right-sided low back pain with right-sided sciatica  cyclobenzaprine (FLEXERIL) 10 MG tablet    MethylPREDNISolone (MEDROL PO)    MRI Lumbar Spine WO Contrast    triamcinolone acetonide (KENALOG-40) injection 80 mg    dexamethasone (DECADRON) injection 2 mg    methylPREDNISolone (MEDROL, MAYCO,) 4 MG tablet    nabumetone (RELAFEN) 500 MG tablet    HYDROcodone-acetaminophen (NORCO) 7.5-325 MG per tablet    cyclobenzaprine (FLEXERIL) 10 MG tablet    Ambulatory referral to Neurosurgery           Plan:      Short term pain medication prescribed today, Kleber Ramos is clear, no recent rx. Work note given for two weeks. MRI lumbar spine. Patient states CT in [place] ER showed \"inflamed sciatica\". Refer to neurosurg.

## 2017-10-03 NOTE — MR AVS SNAPSHOT
After Visit Summary             Charles Perea   10/3/2017 8:15 AM   Office Visit    Description:  Male : 1960   Provider:  GIOVANNY Luis   Department:  [address] and Future Appointments         Below is a list of your follow-up and future appointments. This may not be a complete list as you may have made appointments directly with providers that we are not aware of or your providers may have made some for you. Please call your providers to confirm appointments. It is important to keep your appointments. Please bring your current insurance card, photo ID, co-pay, and all medication bottles to your appointment. If self-pay, payment is expected at the time of service. Your To-Do List     Future Orders Complete By Expires    MRI Lumbar Spine WO Contrast [[id number] Custom]  10/3/2017 10/3/2018         Information from Your Visit        Department     Name Address Phone Fax    [place] [address] [phone]      You Were Seen for:         Comments    Acute right-sided low back pain with right-sided sciatica   [[id number]]         Vital Signs     Blood Pressure Pulse Temperature Respirations Oxygen Saturation Smoking Status    130/74 (Site: Right Arm, Position: Sitting, Cuff Size: Large Adult) 88 97.9 °F (36.6 °C) (Oral) 16 98% Current Every Day Smoker      Additional Information about your Body Mass Index (BMI)           Your BMI as listed above is considered obese (30 or more). BMI is an estimate of body fat, calculated from your height and weight. The higher your BMI, the greater your risk of heart disease, high blood pressure, type 2 diabetes, stroke, gallstones, arthritis, sleep apnea, and certain cancers. BMI is not perfect. It may overestimate body fat in athletes and people who are more muscular.   Even a small weight loss (between 5 and 10 time, take breaks from sitting. Get up and walk around, or lie in a comfortable position. · Try using a heating pad on a low or medium setting for 15 to 20 minutes every 2 or 3 hours. Try a warm shower in place of one session with the heating pad. · You can also try an ice pack for 10 to 15 minutes every 2 to 3 hours. Put a thin cloth between the ice pack and your skin. · Take pain medicines exactly as directed. ¨ If the doctor gave you a prescription medicine for pain, take it as prescribed. ¨ If you are not taking a prescription pain medicine, ask your doctor if you can take an over-the-counter medicine. · Take short walks several times a day. You can start with 5 to 10 minutes, 3 or 4 times a day, and work up to longer walks. Walk on level surfaces and avoid hills and stairs until your back is better. · Return to work and other activities as soon as you can. Continued rest without activity is usually not good for your back. · To prevent future back pain, do exercises to stretch and strengthen your back and stomach. Learn how to use good posture, safe lifting techniques, and proper body mechanics. When should you call for help? Call your doctor now or seek immediate medical care if:  · You have new or worsening numbness in your legs. · You have new or worsening weakness in your legs. (This could make it hard to stand up.)  · You lose control of your bladder or bowels. Watch closely for changes in your health, and be sure to contact your doctor if:  · Your pain gets worse. · You are not getting better after 2 weeks. Where can you learn more? Go to https://marli.UroSens. org and sign in to your [employer] account. Enter V332 in the BlockSpring box to learn more about \"Back Pain: Care Instructions. \"     If you do not have an account, please click on the \"Sign Up Now\" link.   Current as of: March 21, 2017  Content Version: 11.3 © 7913-6431 Healthwise, Incorporated. Care instructions adapted under license by [facility] ([facility]). If you have questions about a medical condition or this instruction, always ask your healthcare professional. [address] any warranty or liability for your use of this information. Low Back Pain: Exercises  Your Care Instructions  Here are some examples of typical rehabilitation exercises for your condition. Start each exercise slowly. Ease off the exercise if you start to have pain. Your doctor or physical therapist will tell you when you can start these exercises and which ones will work best for you. How to do the exercises  Press-up    1. Lie on your stomach, supporting your body with your forearms. 2. Press your elbows down into the floor to raise your upper back. As you do this, relax your stomach muscles and allow your back to arch without using your back muscles. As your press up, do not let your hips or pelvis come off the floor. 3. Hold for 15 to 30 seconds, then relax. 4. Repeat 2 to 4 times. Alternate arm and leg (bird dog) exercise    Note: Do this exercise slowly. Try to keep your body straight at all times, and do not let one hip drop lower than the other. 1. Start on the floor, on your hands and knees. 2. Tighten your belly muscles. 3. Raise one leg off the floor, and hold it straight out behind you. Be careful not to let your hip drop down, because that will twist your trunk. 4. Hold for about 6 seconds, then lower your leg and switch to the other leg. 5. Repeat 8 to 12 times on each leg. 6. Over time, work up to holding for 10 to 30 seconds each time. 7. If you feel stable and secure with your leg raised, try raising the opposite arm straight out in front of you at the same time. Knee-to-chest exercise    1. Lie on your back with your knees bent and your feet flat on the floor.   2. Bring one knee to your chest, keeping the other foot flat on the floor Incorporated disclaims any warranty or liability for your use of this information. Acute Low Back Pain: Exercises  Your Care Instructions  Here are some examples of typical rehabilitation exercises for your condition. Start each exercise slowly. Ease off the exercise if you start to have pain. Your doctor or physical therapist will tell you when you can start these exercises and which ones will work best for you. When you are not being active, find a comfortable position for rest. Some people are comfortable on the floor or a medium-firm bed with a small pillow under their head and another under their knees. Some people prefer to lie on their side with a pillow between their knees. Don't stay in one position for too long. Take short walks (10 to 20 minutes) every 2 to 3 hours. Avoid slopes, hills, and stairs until you feel better. Walk only distances you can manage without pain, especially leg pain. How to do the exercises  Back stretches    5. Get down on your hands and knees on the floor. 6. Relax your head and allow it to droop. Round your back up toward the ceiling until you feel a nice stretch in your upper, middle, and lower back. Hold this stretch for as long as it feels comfortable, or about 15 to 30 seconds. 7. Return to the starting position with a flat back while you are on your hands and knees. 8. Let your back sway by pressing your stomach toward the floor. Lift your buttocks toward the ceiling. 9. Hold this position for 15 to 30 seconds. 10. Repeat 2 to 4 times. Follow-up care is a key part of your treatment and safety. Be sure to make and go to all appointments, and call your doctor if you are having problems. It's also a good idea to know your test results and keep a list of the medicines you take. Where can you learn more? Go to https://marli.Spottly. org and sign in to your [employer] account.  Enter K443 in the Stratus5 box to learn more to support your back. Your ears and shoulders should be lined up over your hips. ¨ Anytime you begin to feel pain in your back, check your posture. You may be able to fix the problem by paying attention to how you are sitting or standing. · Get some exercise every day. Exercise may not only help treat low back pain, but it may also help keep your back from hurting again. ¨ Each day, try to do some stretching and some exercises to strengthen your stomach, back, and legs. ¨ You should also do daily exercises that get your heart rate up, such as walking, swimming, or biking. · Do not smoke. Smoking decreases blood flow and slows healing. If you need help quitting, talk to your doctor about stop-smoking programs and medicines. These can increase your chances of quitting for good. · Take pain medicines exactly as directed. ¨ If the doctor gave you a prescription medicine for pain, take it as prescribed. ¨ If you are not taking a prescription pain medicine, ask your doctor if you can take an over-the-counter medicine. Make changes at work  Talk to your supervisor or human resources department. They may have good ideas on how you can protect your back at work. Some companies have experts who can suggest different tools or ways to do your job. · You may need to develop a gradual [id number]-am-xfwz plan. Be honest about what you feel you can and cannot do. If you need it, your doctor can give you a prescription for shorter work days or fewer duties. · If you know parts of your job are putting stress on your back, ask if there are other ways you can do those jobs. Or ask if someone else could take over that work. · If your job involves a lot of sitting, you may be able to get a better chair. Chairs that are adjustable or that have lumbar supports may help you. Ergonomics means matching the human body to the job. By studying your work environment and the tools you use, you can reduce your chances of back pain. · You are more likely to have low back pain if you work intensely for long periods of time without breaks. Take regular breaks and do stretching exercises to reduce this risk. Try to take 3- to 5-minute breaks, or change tasks, every 20 to 40 minutes. · If your job involves a lot of sittin [facility] a small pillow, a rolled-up towel, or a lumbar roll in the curve of your back for extra support. ¨ Sit in a chair that is low enough that you can place both feet flat on the floor. Your knees should be slightly lower than your hips when you are sitting. ¨ Try a kneeling chair or an exercise ball. A kneeling chair tilts your hips forward. This takes pressure off your lower back. An exercise ball can rock from side to side, which helps keep your back loose. · If your job involves lifting:  ¨ Hold the object close to you. [facility] [place] your knees and keep your back straight as you grasp the object, then straighten your knees to lift it up. Try to avoid twisting. ¨ Set down your load carefully, squatting with your knees and hips only. · When you lift:  ¨ Do not try to lift something by yourself that is too heavy or too awkward to carry, or that will not allow you to see where you are walking. ¨ Do not rely on a \"back belt\" to protect your back. Studies have not shown them to be effective. · You may be able to get more information on ergonomics from your state Department of Labor, the Occupational Safety and Health Administration (OSHA), or the [employer] for [address] [place] [address] St and Safety (inDplay). Where can you learn more? Go to https://PlaceFullmiracleewofe.Mems-ID. org and sign in to your [employer] account. Enter S989 in the Aventeon box to learn more about \"Fdklkv-oz-Otqi Plan for People With Low Back Pain: Care Instructions. \"     If you do not have an account, please click on the \"Sign Up Now\" link.   Current as of: 2017  Content Version: 11.3 © 9535-7329 Healthwise, Incorporated. Care instructions adapted under license by [facility] ([facility]). If you have questions about a medical condition or this instruction, always ask your healthcare professional. [address] any warranty or liability for your use of this information. Today's Medication Changes          These changes are accurate as of: 10/3/17  9:35 AM.  If you have any questions, ask your nurse or doctor. START taking these medications           HYDROcodone-acetaminophen 7.5-325 MG per tablet   Commonly known as:  NORCO   Instructions: Take 1 tablet by mouth every 6 hours as needed for Pain . Quantity:  30 tablet   Refills:  0   Started by:  GIOVANNY Shell       nabumetone 500 MG tablet   Commonly known as:  RELAFEN   Instructions: Take 1 tablet by mouth 2 times daily For pain/inflammation   Quantity:  60 tablet   Refills:  3   Started by:  GIOVANNY Shell         CHANGE how you take these medications           * cyclobenzaprine 10 MG tablet   Commonly known as:  FLEXERIL   Instructions: Take 1 tablet by mouth 3 times daily as needed for Muscle spasms   Quantity:  60 tablet   Refills:  1   What changed: You were already taking a medication with the same name, and this prescription was added. Make sure you understand how and when to take each. Changed by:  GIOVANNY Shell       * cyclobenzaprine 10 MG tablet   Commonly known as:  FLEXERIL   Instructions: Take 10 mg by mouth 3 times daily as needed for Muscle spasms   Refills:  0   What changed:  Another medication with the same name was added. Make sure you understand how and when to take each. Changed by:  GIOVANNY Shell       * methylPREDNISolone 4 MG tablet   Commonly known as:  MEDROL (MAYCO)   Instructions: Take by mouth. Quantity:  1 kit   Refills:  0   What changed:   You were already taking a medication with the same name, mouth every 6 hours as needed for Pain . cyclobenzaprine (FLEXERIL) 10 MG tablet Take 1 tablet by mouth 3 times daily as needed for Muscle spasms    olmesartan (BENICAR) 40 MG tablet Take 1 tablet by mouth daily    amLODIPine (NORVASC) 5 MG tablet Take 1 tablet by mouth daily    atorvastatin (LIPITOR) 40 MG tablet TAKE 1 TABLET BY MOUTH AT BEDTIME    omeprazole (PRILOSEC) 20 MG delayed release capsule Take 1 capsule by mouth every morning (before breakfast)    chlorthalidone (HYGROTON) 25 MG tablet TAKE ONE 1/2 TABLET BY MOUTH DAILY. Allergies              Penicillins Hives      We Ordered/Performed the following           Ambulatory referral to Neurosurgery     Comments: The patient can be scheduled with any member of the group, including the provider with the first available appointments. Additional Information        Basic Information     Date Of Birth Sex Race Ethnicity Preferred Language Preferred Written Language    1960 Male White Non-/Non  English English      Problem List as of 10/3/2017  Date Reviewed: 10/3/2017                Hyperlipidemia    Hypertension    GERD (gastroesophageal reflux disease)      Preventive Care        Date Due    Hepatitis C screening is recommended for all adults regardless of risk factors born between [facility] at least once (lifetime) who have never been tested. 1960    HIV screening is recommended for all people regardless of risk factors  aged 15-65 years at least once (lifetime) who have never been HIV tested.  9/23/1974    Tetanus Combination Vaccine (1 - Tdap) 9/23/1978    Yearly Flu Vaccine (1) 5/1/2018 (Originally 9/1/2017)    Pneumococcal Vaccine - Pneumovax for adults aged 19-64 years with: chronic heart disease, chronic lung disease, diabetes mellitus, alcoholism, chronic liver disease, or cigarette smoking. (1 of 1 - PPSV23) 5/1/2018 (Originally 9/23/1978)    Colonoscopy 6/19/2022    Cholesterol Screening 9/11/2022

## 2017-10-05 ENCOUNTER — TELEPHONE (OUTPATIENT)
Dept: NEUROSURGERY | Age: 57
End: 2017-10-05

## 2017-10-05 DIAGNOSIS — M54.41 ACUTE RIGHT-SIDED LOW BACK PAIN WITH RIGHT-SIDED SCIATICA: Primary | ICD-10-CM

## 2017-10-05 NOTE — TELEPHONE ENCOUNTER
Neurosurgical pre apt questionnaire     Referring physician? Cristal Ogden    Who is completing questionnaire? PATIENT     Has the pt had any previous spinal/brain surgeries? NO    If yes, Where was the surgery preformed? What was the surgery? Who was the surgeon? When was this surgery? Why is the pt not following up with previous surgeon? Is this a second opinion? Was a MRI preformed? YES - 10/13/17    If not, Is there any reason a MRI cannot be performed? Is there metal anywhere in the body? If yes,  Where was the [facility] preformed? [facility]   What part of the body? LUMBAR SPINE   When was it preformed? 10/13/17      Note:If  was not the facility that performed the study,    the disc will need to be brought appoint. Physical Therapy? NO   If yes, where was PT completed? What is the duration of therapy? Pain Management? NO   If yes, where was pain mgt therapy? Is the patient still under contract with pain mgt? Who is the pain mgt physician? Is the pt currently taking anything for pain control? [address] [employer]     Employment Status ? EMPLOYED   What type of employment? [employer] -    Has the patient missed work due to pain? YES   If unemployed, how long? Are you on disability? Symptoms?         LOW RIGHT BACK PAIN THROUGHOUT TO TOES, RIGHT LEG CONSTANTLY IN PAIN,

## 2017-10-06 ENCOUNTER — OFFICE VISIT (OUTPATIENT)
Dept: FAMILY MEDICINE CLINIC | Age: 57
End: 2017-10-06
Payer: COMMERCIAL

## 2017-10-06 VITALS
OXYGEN SATURATION: 98 % | HEART RATE: 113 BPM | DIASTOLIC BLOOD PRESSURE: 70 MMHG | RESPIRATION RATE: 16 BRPM | SYSTOLIC BLOOD PRESSURE: 128 MMHG | TEMPERATURE: 98.5 F

## 2017-10-06 DIAGNOSIS — M54.41 ACUTE RIGHT-SIDED LOW BACK PAIN WITH RIGHT-SIDED SCIATICA: Primary | ICD-10-CM

## 2017-10-06 PROCEDURE — 99213 OFFICE O/P EST LOW 20 MIN: CPT | Performed by: NURSE PRACTITIONER

## 2017-10-06 RX ORDER — OXYCODONE HYDROCHLORIDE AND ACETAMINOPHEN 5; 325 MG/1; MG/1
1 TABLET ORAL EVERY 8 HOURS PRN
Qty: 9 TABLET | Refills: 0 | Status: SHIPPED | OUTPATIENT
Start: 2017-10-06 | End: 2017-10-13

## 2017-10-06 RX ORDER — METHOCARBAMOL 500 MG/1
500 TABLET, FILM COATED ORAL 3 TIMES DAILY
Qty: 30 TABLET | Refills: 0 | Status: SHIPPED | OUTPATIENT
Start: 2017-10-06 | End: 2017-10-16

## 2017-10-06 ASSESSMENT — ENCOUNTER SYMPTOMS
BOWEL INCONTINENCE: 0
BACK PAIN: 1

## 2017-10-06 NOTE — PROGRESS NOTES
Subjective:      Patient ID: Luis Coffey is a 61 y.o. male. Chief Complaint   Patient presents with    Back Pain     not any better. has appointment for MRI 10/13/2017 and with Dr. Victoria Mendieta 10/17/2017       Back Pain   This is a new problem. The current episode started 1 to 4 weeks ago. The problem occurs constantly. The problem is unchanged. The pain is present in the lumbar spine. The quality of the pain is described as burning, shooting and stabbing. The pain radiates to the right foot, right knee and right thigh. The pain is at a severity of 9/10. The pain is severe. The pain is the same all the time. The symptoms are aggravated by position and standing. Associated symptoms include leg pain, paresthesias and weakness. Pertinent negatives include no bladder incontinence, bowel incontinence, numbness or perianal numbness. He has tried analgesics, bed rest, heat, home exercises, ice, muscle relaxant, NSAIDs and walking (norco) for the symptoms. The treatment provided no relief. patient states that combination of nsaids, muscle relaxants and norco have not reduced his pain at all. He is not sleeping due to the severe pain. Review of Systems   Gastrointestinal: Negative for bowel incontinence. Genitourinary: Negative for bladder incontinence. Musculoskeletal: Positive for back pain. Neurological: Positive for weakness and paresthesias. Negative for numbness.      Allergies   Allergen Reactions    Penicillins Hives     Current Outpatient Prescriptions on File Prior to Visit   Medication Sig Dispense Refill    aspirin 81 MG tablet Take 81 mg by mouth daily      MULTIPLE VITAMIN PO Take by mouth daily      cyclobenzaprine (FLEXERIL) 10 MG tablet Take 10 mg by mouth 3 times daily as needed for Muscle spasms      ibuprofen (ADVIL;MOTRIN) 400 MG tablet Take 400 mg by mouth every 6 hours as needed for Pain      MethylPREDNISolone (MEDROL PO) Take by mouth Medrol dose pack      methylPREDNISolone (MEDROL, MAYCO,) 4 MG tablet Take by mouth. 1 kit 0    nabumetone (RELAFEN) 500 MG tablet Take 1 tablet by mouth 2 times daily For pain/inflammation 60 tablet 3    HYDROcodone-acetaminophen (NORCO) 7.5-325 MG per tablet Take 1 tablet by mouth every 6 hours as needed for Pain . 30 tablet 0    cyclobenzaprine (FLEXERIL) 10 MG tablet Take 1 tablet by mouth 3 times daily as needed for Muscle spasms 60 tablet 1    olmesartan (BENICAR) 40 MG tablet Take 1 tablet by mouth daily 90 tablet 1    amLODIPine (NORVASC) 5 MG tablet Take 1 tablet by mouth daily 90 tablet 1    atorvastatin (LIPITOR) 40 MG tablet TAKE 1 TABLET BY MOUTH AT BEDTIME 90 tablet 1    omeprazole (PRILOSEC) 20 MG delayed release capsule Take 1 capsule by mouth every morning (before breakfast) 90 capsule 1    chlorthalidone (HYGROTON) 25 MG tablet TAKE ONE 1/2 TABLET BY MOUTH DAILY. 45 tablet 1     No current facility-administered medications on file prior to visit. Past Medical History:   Diagnosis Date    Hyperlipidemia     Hypertension        Objective:   Physical Exam   Constitutional: He is oriented to person, place, and time. He appears well-developed and well-nourished. He appears distressed. Musculoskeletal:        Lumbar back: He exhibits decreased range of motion, tenderness, pain and spasm. Neurological: He is alert and oriented to person, place, and time. Skin: Skin is warm and dry. Psychiatric: His behavior is normal. Thought content normal.     /70 (Site: Left Arm, Position: Sitting, Cuff Size: Large Adult)  Pulse 113  Temp 98.5 °F (36.9 °C) (Oral)   Resp 16  SpO2 98%    Assessment:      1. Acute right-sided low back pain with right-sided sciatica  oxyCODONE-acetaminophen (PERCOCET) 5-325 MG per tablet    methocarbamol (ROBAXIN) 500 MG tablet           Plan:      DC Norco.   DC Flexeril. Start percocet. Start robaxin. If pain not relieved, to ER for pain management. Finish medrol pack today.

## 2017-10-06 NOTE — PATIENT INSTRUCTIONS
Acute Low Back Pain: Exercises  Your Care Instructions  Here are some examples of typical rehabilitation exercises for your condition. Start each exercise slowly. Ease off the exercise if you start to have pain. Your doctor or physical therapist will tell you when you can start these exercises and which ones will work best for you. When you are not being active, find a comfortable position for rest. Some people are comfortable on the floor or a medium-firm bed with a small pillow under their head and another under their knees. Some people prefer to lie on their side with a pillow between their knees. Don't stay in one position for too long. Take short walks (10 to 20 minutes) every 2 to 3 hours. Avoid slopes, hills, and stairs until you feel better. Walk only distances you can manage without pain, especially leg pain. How to do the exercises  Back stretches    1. Get down on your hands and knees on the floor. 2. Relax your head and allow it to droop. Round your back up toward the ceiling until you feel a nice stretch in your upper, middle, and lower back. Hold this stretch for as long as it feels comfortable, or about 15 to 30 seconds. 3. Return to the starting position with a flat back while you are on your hands and knees. 4. Let your back sway by pressing your stomach toward the floor. Lift your buttocks toward the ceiling. 5. Hold this position for 15 to 30 seconds. 6. Repeat 2 to 4 times. Follow-up care is a key part of your treatment and safety. Be sure to make and go to all appointments, and call your doctor if you are having problems. It's also a good idea to know your test results and keep a list of the medicines you take. Where can you learn more? Go to [email or web address]://[email or web address].[email or web address] [email or web address] org and sign in to your [employer] account. Enter Y597 in the Memetales box to learn more about \"Acute Low Back Pain: Exercises. \"     If you do not have an account, please click on the \"Sign Up Now\" link. Current as of: March 21, 2017  Content Version: 11.3  © 2167-0199 [employer], Incorporated. Care instructions adapted under license by [facility] ([facility]). If you have questions about a medical condition or this instruction, always ask your healthcare professional. [address] any warranty or liability for your use of this information.

## 2017-10-12 ENCOUNTER — TELEPHONE (OUTPATIENT)
Dept: FAMILY MEDICINE CLINIC | Age: 57
End: 2017-10-12

## 2017-10-12 DIAGNOSIS — M54.41 ACUTE BACK PAIN WITH SCIATICA, RIGHT: Primary | ICD-10-CM

## 2017-10-12 NOTE — TELEPHONE ENCOUNTER
Wife called and said she received two calls today from [facility] where his MRI was scheduled. First they asked who ordered it and then they called back and said that the Office did not fill out the paperwork right and it did not get preauthorized. Told patient they would have to reschedule. Patient is upset and in pain, wife stated they have been in the emergency room twice now for this and they have been waiting to get this done for over 2 weeks. They would like to have an update or an idea when they are going to be getting this done and what exactly happened. Would you mind giving them a call and/or update?   Thank you

## 2017-10-13 ENCOUNTER — TELEPHONE (OUTPATIENT)
Dept: FAMILY MEDICINE CLINIC | Age: 57
End: 2017-10-13

## 2017-10-13 NOTE — TELEPHONE ENCOUNTER
----- Message from Nicolette Dexter sent at 10/13/2017  8:49 AM CDT -----  Contact: wife  Patient was supposed to get MRI today but hospital called to tell them the order wasn't right and they couldn't do it. He injured his back at work and has been waiting 13 days for MRI. He has an appt with Dr Brandon Dewitt on 10/17/2017 and they told him if he doesn't have MRI he will have to cancel his appt. Please call him at the number above.  Thanks

## 2017-10-13 NOTE — TELEPHONE ENCOUNTER
This was taken care of first thing this morning. Patient is having MRI today at 5:15 at [facility].

## 2017-10-17 ENCOUNTER — HOSPITAL ENCOUNTER (OUTPATIENT)
Dept: GENERAL RADIOLOGY | Age: 57
Discharge: HOME | End: 2017-10-17
Payer: COMMERCIAL

## 2017-10-17 ENCOUNTER — OFFICE VISIT (OUTPATIENT)
Dept: NEUROSURGERY | Age: 57
End: 2017-10-17
Payer: COMMERCIAL

## 2017-10-17 VITALS
DIASTOLIC BLOOD PRESSURE: 71 MMHG | BODY MASS INDEX: 33.91 KG/M2 | OXYGEN SATURATION: 96 % | HEART RATE: 113 BPM | HEIGHT: 66 IN | WEIGHT: 211 LBS | SYSTOLIC BLOOD PRESSURE: 105 MMHG

## 2017-10-17 DIAGNOSIS — M79.604 RIGHT LEG PAIN: Primary | ICD-10-CM

## 2017-10-17 DIAGNOSIS — M54.41 ACUTE RIGHT-SIDED LOW BACK PAIN WITH RIGHT-SIDED SCIATICA: ICD-10-CM

## 2017-10-17 DIAGNOSIS — R20.8 DYSESTHESIA: ICD-10-CM

## 2017-10-17 DIAGNOSIS — R20.0 NUMBNESS OF TOES: ICD-10-CM

## 2017-10-17 DIAGNOSIS — M79.604 RIGHT LEG PAIN: ICD-10-CM

## 2017-10-17 PROCEDURE — 99204 OFFICE O/P NEW MOD 45 MIN: CPT | Performed by: NURSE PRACTITIONER

## 2017-10-17 PROCEDURE — 72110 X-RAY EXAM L-2 SPINE 4/>VWS: CPT

## 2017-10-17 RX ORDER — KETOROLAC TROMETHAMINE 10 MG/1
TABLET, FILM COATED ORAL
Refills: 0 | COMMUNITY
Start: 2017-10-08 | End: 2017-11-01 | Stop reason: ALTCHOICE

## 2017-10-17 RX ORDER — OXYCODONE HYDROCHLORIDE AND ACETAMINOPHEN 5; 325 MG/1; MG/1
1 TABLET ORAL EVERY 6 HOURS PRN
Qty: 60 TABLET | Refills: 0 | Status: SHIPPED | OUTPATIENT
Start: 2017-10-17 | End: 2017-11-16

## 2017-10-17 RX ORDER — OXYCODONE AND ACETAMINOPHEN 10; 325 MG/1; MG/1
TABLET ORAL
Refills: 0 | COMMUNITY
Start: 2017-10-08 | End: 2017-11-01 | Stop reason: ALTCHOICE

## 2017-10-17 ASSESSMENT — ENCOUNTER SYMPTOMS
RESPIRATORY NEGATIVE: 1
EYES NEGATIVE: 1
GASTROINTESTINAL NEGATIVE: 1
BACK PAIN: 1

## 2017-10-17 NOTE — PROGRESS NOTES
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 The pain does radiate into the right buttock, posterolateral thigh, and down to the dorsum of the foot and his toes. His pain is mostly located in his right leg. The patient complains of numbness in his right toes. 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 He then went to the local ED in [place] where they gave him some pain medication and sent him home. He denies pain on the left. His pain is not changed when going from a seated to standing position. His pain is not changed with walking. His pain is not changed when lying flat. Overall, indicative that the patient does not have a mechanical nature to their pain. He states that 40% of his pain is located in the back and 60% is leg pain. The patient states that he can no longer walk or work which has dramatically affected his quality of life. The patient has underwent a non-operative treatment course that has included:  NSAIDs (Aleve)  Muscle Relaxers  Opiates (Norco, Percocet)  Oral Steroids      Of note he does use tobacco and does take blood thinning medications (ASA).                Past Medical History:   Diagnosis Date    Hyperlipidemia     Hypertension        Past Surgical History:   Procedure Laterality Date    FINGER SURGERY      OTHER SURGICAL HISTORY  08/2017    colonoscopy done at [address] [place] W/BIOPSY SINGLE/MULTIPLE N/A 6/19/2017    Dr Narendra Timmons x 4--5 yr recall       Current Outpatient Prescriptions   Medication Sig Dispense Refill    aspirin 81 MG tablet Take 81 mg by mouth daily      MULTIPLE VITAMIN PO Take by mouth daily      nabumetone (RELAFEN) 500 MG tablet Take 1 tablet by mouth 2 times daily For pain/inflammation 60 tablet 3    olmesartan (BENICAR) 40 MG tablet Take 1 tablet by mouth daily 90 tablet 1    amLODIPine (NORVASC) 5 MG tablet Take 1 tablet by mouth daily 90 tablet 1    atorvastatin (LIPITOR) 40 MG tablet TAKE 1 TABLET BY MOUTH AT BEDTIME 90 tablet 1    omeprazole (PRILOSEC) 20 MG delayed release capsule Take 1 capsule by mouth every morning (before breakfast) 90 capsule 1    chlorthalidone (HYGROTON) 25 MG tablet TAKE ONE 1/2 TABLET BY MOUTH DAILY. 45 tablet 1    ketorolac (TORADOL) 10 MG tablet TAKE 1 TABLET BY MOUTH EVERY 8 HOURS IN BETWEEN PERCOCETS AS NEEDED FOR PAIN  0    oxyCODONE-acetaminophen (PERCOCET)  MG per tablet TAKE 1 TABLET BY MOUTH EVERY 8 HOURS AS NEEDED FOR PAIN  0     No current facility-administered medications for this visit. Allergies:  Penicillins    Social History:   History   Smoking Status    Current Every Day Smoker    Packs/day: 2.00    Years: 45.00    Types: Cigarettes   Smokeless Tobacco    Never Used     History   Alcohol Use No         Family History:   Family History   Problem Relation Age of Onset    High Blood Pressure Mother     Cancer Mother        REVIEW OF SYSTEMS:  Review of Systems   Constitutional: Negative. HENT: Negative. Eyes: Negative. Respiratory: Negative. Cardiovascular: Negative. Gastrointestinal: Negative. Genitourinary: Negative. Musculoskeletal: Positive for back pain. Negative for falls, joint pain, myalgias and neck pain. Skin: Negative.     Neurological: Positive for tingling. Negative for dizziness, tremors, sensory change, speech change, focal weakness, seizures, loss of consciousness and headaches. Endo/Heme/Allergies: Negative. Psychiatric/Behavioral: Negative. PHYSICAL EXAM:  Vitals:    10/17/17 0952   BP: 105/71   Pulse: 113   SpO2: 96%     Constitutional: appears well-developed and well-nourished. Eyes  conjunctiva normal.  Pupils react to light  Ear, nose, throat -hearing intact to finger rub, No scars, masses, or lesions over external nose or ears, no atrophy of tongue  Neck-symmetric, no masses noted, no jugular vein distension  Respiration- chest wall appears symmetric, good expansion, normal effort without use of accessory muscles  Musculoskeletal  no significant wasting of muscles noted, no bony deformities, gait no gross ataxia  Extremities-no clubbing, cyanosis or edema  Skin  warm, dry, and intact. No rash, erythema, or pallor. Psychiatric  mood, affect, and behavior appear normal.     Neurologic Examinaiton  Awake, Alert and oriented x 3  Normal speech pattern, following commands  Motor 5/5 all extremities  No deficits to light touch or pinprick sensation  Reflexes are 2+ and symmetric  Severe myofacial tenderness to palpation of RLE     Transported in a wheelchair     DATA and IMAGING:    Nursing/pcp notes, imaging, labs, and vitals reviewed.      PT,OT and/or speech notes reviewed    Lab Results   Component Value Date    WBC 9.6 09/11/2017    HGB 16.2 09/11/2017    HCT 46.2 09/11/2017    MCV 94.3 (H) 09/11/2017     09/11/2017     Lab Results   Component Value Date     09/11/2017    K 4.2 09/11/2017     09/11/2017    CO2 27 09/11/2017    BUN 19 09/11/2017    CREATININE 0.8 09/11/2017    GLUCOSE 98 09/11/2017    CALCIUM 10.0 09/11/2017    PROT 7.6 09/11/2017    LABALBU 4.3 09/11/2017    BILITOT 0.5 09/11/2017    ALKPHOS 91 09/11/2017    AST 21 09/11/2017    ALT 28 09/11/2017    LABGLOM >60 09/11/2017    GLOB 2.6

## 2017-10-26 ENCOUNTER — TELEPHONE (OUTPATIENT)
Dept: NEUROSURGERY | Age: 57
End: 2017-10-26

## 2017-10-26 NOTE — TELEPHONE ENCOUNTER
Called patient to see what was going on. I told them unfortunately we cannot get him in today due to dr lindsay having an urgent surgery this afternoon.  They will have to wait until Tuesday to see us but I told her to take him to the ER if necessary

## 2017-10-26 NOTE — TELEPHONE ENCOUNTER
pt is having a lot of pain and trouble walking, etc. wants to be seen sooner if at all possible due to most likely needing surgery per last visit with Dr.Jonathan Low;please call pt & advise;anj

## 2017-10-31 ENCOUNTER — HOSPITAL ENCOUNTER (OUTPATIENT)
Dept: VASCULAR LAB | Age: 57
Discharge: HOME | End: 2017-10-31
Payer: COMMERCIAL

## 2017-10-31 ENCOUNTER — HOSPITAL ENCOUNTER (EMERGENCY)
Age: 57
Discharge: HOME | End: 2017-10-31
Attending: EMERGENCY MEDICINE
Payer: COMMERCIAL

## 2017-10-31 ENCOUNTER — OFFICE VISIT (OUTPATIENT)
Dept: NEUROSURGERY | Age: 57
End: 2017-10-31
Payer: COMMERCIAL

## 2017-10-31 VITALS
WEIGHT: 210 LBS | TEMPERATURE: 97.8 F | HEART RATE: 80 BPM | DIASTOLIC BLOOD PRESSURE: 84 MMHG | OXYGEN SATURATION: 93 % | HEIGHT: 66 IN | SYSTOLIC BLOOD PRESSURE: 134 MMHG | BODY MASS INDEX: 33.75 KG/M2 | RESPIRATION RATE: 18 BRPM

## 2017-10-31 VITALS
HEART RATE: 121 BPM | BODY MASS INDEX: 34.07 KG/M2 | OXYGEN SATURATION: 95 % | WEIGHT: 212 LBS | DIASTOLIC BLOOD PRESSURE: 85 MMHG | HEIGHT: 66 IN | SYSTOLIC BLOOD PRESSURE: 139 MMHG

## 2017-10-31 DIAGNOSIS — I82.4Z1 ACUTE DEEP VEIN THROMBOSIS (DVT) OF DISTAL END OF RIGHT LOWER EXTREMITY (HCC): Primary | ICD-10-CM

## 2017-10-31 DIAGNOSIS — R20.0 NUMBNESS OF TOES: ICD-10-CM

## 2017-10-31 DIAGNOSIS — M79.604 RIGHT LEG PAIN: ICD-10-CM

## 2017-10-31 DIAGNOSIS — M54.41 ACUTE RIGHT-SIDED LOW BACK PAIN WITH RIGHT-SIDED SCIATICA: ICD-10-CM

## 2017-10-31 DIAGNOSIS — M48.062 SPINAL STENOSIS OF LUMBAR REGION WITH NEUROGENIC CLAUDICATION: ICD-10-CM

## 2017-10-31 DIAGNOSIS — R60.0 LOWER EXTREMITY EDEMA: Primary | ICD-10-CM

## 2017-10-31 DIAGNOSIS — R60.0 LOWER EXTREMITY EDEMA: ICD-10-CM

## 2017-10-31 LAB
ALBUMIN SERPL-MCNC: 4.3 G/DL (ref 3.5–5.2)
ALP BLD-CCNC: 108 U/L (ref 40–130)
ALT SERPL-CCNC: 16 U/L (ref 5–41)
ANION GAP SERPL CALCULATED.3IONS-SCNC: 12 MMOL/L (ref 7–19)
APTT: 32.9 SEC (ref 26–36.2)
AST SERPL-CCNC: 14 U/L (ref 5–40)
BASOPHILS ABSOLUTE: 0.1 K/UL (ref 0–0.2)
BASOPHILS RELATIVE PERCENT: 0.6 % (ref 0–1)
BILIRUB SERPL-MCNC: 0.3 MG/DL (ref 0.2–1.2)
BUN BLDV-MCNC: 23 MG/DL (ref 6–20)
CALCIUM SERPL-MCNC: 9.6 MG/DL (ref 8.6–10)
CHLORIDE BLD-SCNC: 102 MMOL/L (ref 98–111)
CO2: 27 MMOL/L (ref 22–29)
CREAT SERPL-MCNC: 0.9 MG/DL (ref 0.5–1.2)
EOSINOPHILS ABSOLUTE: 0.1 K/UL (ref 0–0.6)
EOSINOPHILS RELATIVE PERCENT: 1.2 % (ref 0–5)
GFR NON-AFRICAN AMERICAN: >60
GLUCOSE BLD-MCNC: 104 MG/DL (ref 74–109)
HCT VFR BLD CALC: 44.7 % (ref 42–52)
HEMOGLOBIN: 15.8 G/DL (ref 14–18)
INR BLD: 0.96 (ref 0.88–1.18)
LYMPHOCYTES ABSOLUTE: 2.5 K/UL (ref 1.1–4.5)
LYMPHOCYTES RELATIVE PERCENT: 25.1 % (ref 20–40)
MCH RBC QN AUTO: 32.8 PG (ref 27–31)
MCHC RBC AUTO-ENTMCNC: 35.3 G/DL (ref 33–37)
MCV RBC AUTO: 92.9 FL (ref 80–94)
MONOCYTES ABSOLUTE: 0.7 K/UL (ref 0–0.9)
MONOCYTES RELATIVE PERCENT: 6.9 % (ref 0–10)
NEUTROPHILS ABSOLUTE: 6.6 K/UL (ref 1.5–7.5)
NEUTROPHILS RELATIVE PERCENT: 65.8 % (ref 50–65)
PDW BLD-RTO: 13.1 % (ref 11.5–14.5)
PLATELET # BLD: 237 K/UL (ref 130–400)
PMV BLD AUTO: 9.4 FL (ref 9.4–12.4)
POTASSIUM SERPL-SCNC: 3.6 MMOL/L (ref 3.5–5)
PROTHROMBIN TIME: 12.7 SEC (ref 12–14.6)
RBC # BLD: 4.81 M/UL (ref 4.7–6.1)
SODIUM BLD-SCNC: 141 MMOL/L (ref 136–145)
TOTAL PROTEIN: 7.1 G/DL (ref 6.6–8.7)
WBC # BLD: 10 K/UL (ref 4.8–10.8)

## 2017-10-31 PROCEDURE — 93970 EXTREMITY STUDY: CPT

## 2017-10-31 PROCEDURE — 36415 COLL VENOUS BLD VENIPUNCTURE: CPT

## 2017-10-31 PROCEDURE — 80053 COMPREHEN METABOLIC PANEL: CPT

## 2017-10-31 PROCEDURE — 85610 PROTHROMBIN TIME: CPT

## 2017-10-31 PROCEDURE — 85730 THROMBOPLASTIN TIME PARTIAL: CPT

## 2017-10-31 PROCEDURE — 85025 COMPLETE CBC W/AUTO DIFF WBC: CPT

## 2017-10-31 PROCEDURE — 99213 OFFICE O/P EST LOW 20 MIN: CPT | Performed by: NEUROLOGICAL SURGERY

## 2017-10-31 PROCEDURE — 99284 EMERGENCY DEPT VISIT MOD MDM: CPT | Performed by: EMERGENCY MEDICINE

## 2017-10-31 PROCEDURE — 99283 EMERGENCY DEPT VISIT LOW MDM: CPT

## 2017-10-31 PROCEDURE — 6370000000 HC RX 637 (ALT 250 FOR IP): Performed by: EMERGENCY MEDICINE

## 2017-10-31 RX ORDER — IBUPROFEN 800 MG/1
800 TABLET ORAL EVERY 6 HOURS PRN
Qty: 120 TABLET | Refills: 3 | Status: SHIPPED | OUTPATIENT
Start: 2017-10-31 | End: 2017-11-02

## 2017-10-31 RX ADMIN — RIVAROXABAN 15 MG: 15 TABLET, FILM COATED ORAL at 17:49

## 2017-10-31 ASSESSMENT — ENCOUNTER SYMPTOMS
BACK PAIN: 1
RHINORRHEA: 0
GASTROINTESTINAL NEGATIVE: 1
COUGH: 0
BACK PAIN: 1
SHORTNESS OF BREATH: 0
VOMITING: 0
RESPIRATORY NEGATIVE: 1
SORE THROAT: 0
EYES NEGATIVE: 1
DIARRHEA: 0
ABDOMINAL PAIN: 0
NAUSEA: 0

## 2017-10-31 ASSESSMENT — PAIN DESCRIPTION - LOCATION: LOCATION: BACK

## 2017-10-31 ASSESSMENT — PAIN SCALES - GENERAL: PAINLEVEL_OUTOF10: 7

## 2017-10-31 NOTE — ED NOTES
Called case management multiple times and paged over head twice for Dr Sheryl Ramirez, no response      Brad Meyer  10/31/17 [id number]

## 2017-10-31 NOTE — ED PROVIDER NOTES
[facility]  eMERGENCY dEPARTMENT eNCOUnter      Pt Name: Francisca Ponce  MRN: [id number]  [place] 1960  Date of evaluation: 10/31/2017  Provider: Shahid Valdez MD    [address] [place] [address]       Chief Complaint   Patient presents with    Other         HISTORY OF PRESENT ILLNESS   (Location/Symptom, Timing/Onset, Context/Setting, Quality, Duration, Modifying Factors, Severity)  Note limiting factors. Francisca Ponce is a 61 y.o. male who presents to the emergency department For concern of right lower extremity DVT. The patient reports that he had a recent back injury and has been seeing Dr. Louie Blount and he has had MRIs and no operation is currently needed and states his back pain is generally getting better however he has been somewhat immobile and less active. He reports he has had right lower extremity swelling for the past one week. Dr. couch and ordered a lower extremity ultrasound today which was performed and showed right popliteal posterior tibial and peroneal DVTs. HPI    Nursing Notes were reviewed. REVIEW OF SYSTEMS    (2-9 systems for level 4, 10 or more for level 5)     Review of Systems   Constitutional: Negative for chills and fever. HENT: Negative for rhinorrhea and sore throat. Respiratory: Negative for cough and shortness of breath. Cardiovascular: Positive for leg swelling. Negative for chest pain and palpitations. Gastrointestinal: Negative for abdominal pain, diarrhea, nausea and vomiting. Genitourinary: Negative for dysuria and frequency. Musculoskeletal: Positive for back pain.         Chronic            PAST MEDICAL HISTORY     Past Medical History:   Diagnosis Date    GERD (gastroesophageal reflux disease)     Hyperlipidemia     Hypertension          SURGICAL HISTORY       Past Surgical History:   Procedure Laterality Date    FINGER SURGERY      OTHER SURGICAL HISTORY  08/2017    colonoscopy done at [facility] COLONOSCOPY W/BIOPSY SINGLE/MULTIPLE N/A 6/19/2017 Dr Fransisca Talbert x 4--5 yr recall         CURRENT MEDICATIONS       Previous Medications    AMLODIPINE (NORVASC) 5 MG TABLET    Take 1 tablet by mouth daily    ASPIRIN 81 MG TABLET    Take 81 mg by mouth daily    ATORVASTATIN (LIPITOR) 40 MG TABLET    TAKE 1 TABLET BY MOUTH AT BEDTIME    CHLORTHALIDONE (HYGROTON) 25 MG TABLET    TAKE ONE 1/2 TABLET BY MOUTH DAILY. IBUPROFEN (ADVIL;MOTRIN) 800 MG TABLET    Take 1 tablet by mouth every 6 hours as needed for Pain    KETOROLAC (TORADOL) 10 MG TABLET    TAKE 1 TABLET BY MOUTH EVERY 8 HOURS IN BETWEEN PERCOCETS AS NEEDED FOR PAIN    MULTIPLE VITAMIN PO    Take by mouth daily    NABUMETONE (RELAFEN) 500 MG TABLET    Take 1 tablet by mouth 2 times daily For pain/inflammation    OLMESARTAN (BENICAR) 40 MG TABLET    Take 1 tablet by mouth daily    OMEPRAZOLE (PRILOSEC) 20 MG DELAYED RELEASE CAPSULE    Take 1 capsule by mouth every morning (before breakfast)    OXYCODONE-ACETAMINOPHEN (PERCOCET)  MG PER TABLET    TAKE 1 TABLET BY MOUTH EVERY 8 HOURS AS NEEDED FOR PAIN    OXYCODONE-ACETAMINOPHEN (PERCOCET) 5-325 MG PER TABLET    Take 1 tablet by mouth every 6 hours as needed for Pain (TAKE 2 A DAY) .        ALLERGIES     Penicillins    FAMILY HISTORY       Family History   Problem Relation Age of Onset    High Blood Pressure Mother     Cancer Mother           SOCIAL HISTORY       Social History     Social History    Marital status:      Spouse name: N/A    Number of children: N/A    Years of education: N/A     Social History Main Topics    Smoking status: Current Every Day Smoker     Packs/day: 2.00     Years: 45.00     Types: Cigarettes    Smokeless tobacco: Never Used    Alcohol use No    Drug use: No    Sexual activity: Yes     Partners: Female     Other Topics Concern    Not on file     Social History Narrative    No narrative on file       SCREENINGS             PHYSICAL EXAM    (up to 7 for level 4, 8 or more for level 5)     ED Triage Vitals [10/31/17 1534]   BP Temp Temp src Pulse Resp SpO2 Height Weight   129/73 97.8 °F (36.6 °C) -- 94 17 91 % 5' 6\" (1.676 m) 210 lb (95.3 kg)       Physical Exam   Constitutional: He is oriented to person, place, and time. He appears well-developed and well-nourished. No distress. HENT:   Head: Normocephalic and atraumatic. Right Ear: External ear normal.   Left Ear: External ear normal.   Eyes: Conjunctivae and EOM are normal.   Neck: Normal range of motion. Neck supple. Cardiovascular: Normal rate, regular rhythm and normal heart sounds. Pulmonary/Chest: No respiratory distress. He has decreased breath sounds in the right lower field and the left lower field. He has no wheezes. He has no rales. Abdominal: Soft. There is no tenderness. Musculoskeletal: Normal range of motion. Mild increased diameter of distal RLE compared to left, no pitting edema, doppler pt/dp pulses b/l, sensation intact    B/l lumbar paraspinal and midline back pain chronic, sensation intact, strength 4/5 b/l, no saddle anesthesia   Neurological: He is alert and oriented to person, place, and time. Skin: Skin is warm and dry. He is not diaphoretic. DIAGNOSTIC RESULTS           No orders to display           LABS:  Labs Reviewed   CBC WITH AUTO DIFFERENTIAL - Abnormal; Notable for the following:        Result Value    MCH 32.8 (*)     Neutrophils % 65.8 (*)     All other components within normal limits   COMPREHENSIVE METABOLIC PANEL - Abnormal; Notable for the following:     BUN 23 (*)     All other components within normal limits   APTT   PROTIME-INR       All other labs were within normal range or not returned as of this dictation.     EMERGENCY DEPARTMENT COURSE and DIFFERENTIAL DIAGNOSIS/MDM:   Vitals:    Vitals:    10/31/17 1534 10/31/17 1602 10/31/17 1632 10/31/17 1702   BP: 129/73 138/73 132/71 (!) 121/93   Pulse: 94   83   Resp: 17   18   Temp: 97.8 °F (36.6 °C)      SpO2: 91% (!) 89% 92% 93%   Weight: 210 lb (95.3 kg)      Height: 5' 6\" (1.676 m)          MDM  Number of Diagnoses or Management Options  Acute deep vein thrombosis (DVT) of distal end of right lower extremity [facility]):      Amount and/or Complexity of Data Reviewed  Clinical lab tests: ordered and reviewed  Tests in the radiology section of CPT®: reviewed  Discuss the patient with other providers: yes        Vital signs stable, patient with recent back injury and decreased mobility diagnosed with right popliteal posterior tibial and peroneal vein DVT, no complaint of chest pain or shortness of breath, lab work unremarkable, discussed case with vascular surgery Dr. Trevon Almaraz who is agreeable with a NOAC if insurance will pay for it otherwise lovenox, having social work eval situation for insurance [id number]    Multiple attempts to reach social work with help of insurance evaluation and unable to do so, patient does have [employer], have ordered first dose of xarelto by mouth here and will provide with prescription, stressed follow-up with primary care physician, pt did have sat of 89 here once however pt heavy smoker and while I was in room was 92-93 no cp/sob or concern for PE, discussed return precautions, offered observation however patient and wife prefer to go home        CONSULTS:  None    PROCEDURES:  Unless otherwise noted below, none     Procedures    FINAL IMPRESSION      1.  Acute deep vein thrombosis (DVT) of distal end of right lower extremity [facility])          DISPOSITION/PLAN   DISPOSITION Discharge - Pending Orders Complete    PATIENT REFERRED TO:  GIOVANNY Sher  [address] [facility]  [phone]    Schedule an appointment as soon as possible for a visit in 2 days        DISCHARGE MEDICATIONS:  New Prescriptions    RIVAROXABAN (XARELTO) 15 MG TABS TABLET    Take 1 tablet by mouth 2 times daily (with meals) for 21 days          (Please note that portions of this note were completed with a voice recognition program.  Efforts were

## 2017-11-01 ENCOUNTER — OFFICE VISIT (OUTPATIENT)
Dept: FAMILY MEDICINE CLINIC | Age: 57
End: 2017-11-01
Payer: COMMERCIAL

## 2017-11-01 ENCOUNTER — TELEPHONE (OUTPATIENT)
Dept: NEUROSURGERY | Age: 57
End: 2017-11-01

## 2017-11-01 VITALS
HEART RATE: 118 BPM | SYSTOLIC BLOOD PRESSURE: 136 MMHG | TEMPERATURE: 98.7 F | OXYGEN SATURATION: 98 % | RESPIRATION RATE: 16 BRPM | DIASTOLIC BLOOD PRESSURE: 84 MMHG

## 2017-11-01 DIAGNOSIS — I82.401 ACUTE DEEP VEIN THROMBOSIS (DVT) OF RIGHT LOWER EXTREMITY, UNSPECIFIED VEIN (HCC): Primary | ICD-10-CM

## 2017-11-01 PROCEDURE — 99214 OFFICE O/P EST MOD 30 MIN: CPT | Performed by: NURSE PRACTITIONER

## 2017-11-01 RX ORDER — SENNOSIDES 8.6 MG
650 CAPSULE ORAL EVERY 8 HOURS PRN
Qty: 60 TABLET | Refills: 0 | Status: SHIPPED | OUTPATIENT
Start: 2017-11-01

## 2017-11-01 ASSESSMENT — ENCOUNTER SYMPTOMS
ANAL BLEEDING: 0
CONSTIPATION: 0
SHORTNESS OF BREATH: 0

## 2017-11-01 NOTE — TELEPHONE ENCOUNTER
Called patient to inform him that doctor said not to prescribe anymore pain medication to him since he is not a surgical candidate for us.  He stated that he is following up with his PCP this afternoon and is going to bring it up to her and see what she says

## 2017-11-01 NOTE — PATIENT INSTRUCTIONS
embolism). These include:  ¨ Sudden chest pain. ¨ Trouble breathing. ¨ Coughing up blood. Call your doctor now or seek immediate medical care if:  · You have new or worse trouble breathing. · You are dizzy or lightheaded, or you feel like you may faint. · You have symptoms of a blood clot in your arm or leg. These may include:  ¨ Pain in the arm, calf, back of the knee, thigh, or groin. ¨ Redness and swelling in the arm, leg, or groin. Watch closely for changes in your health, and be sure to contact your doctor if:  · You do not get better as expected. Where can you learn more? Go to https://[email or web address] Software. org and sign in to your Socogame account. Enter U557 in the [employer] box to learn more about \"Deep Vein Thrombosis: Care Instructions. \"     If you do not have an account, please click on the \"Sign Up Now\" link. Current as of: March 20, 2017  Content Version: 11.3  © 3073-2953 [employer]. Care instructions adapted under license by [facility] ([facility]). If you have questions about a medical condition or this instruction, always ask your healthcare professional. [place] [address] any warranty or liability for your use of this information. Patient Education        Learning About Deep Vein Thrombosis  What is deep vein thrombosis? A deep vein thrombosis (DVT) is a blood clot in certain veins of the legs, pelvis, or arms. The clot is usually in the legs. DVT may damage the vein and cause the area to ache, swell, and change color. DVT also can lead to sores. DVT in these veins needs to be treated because the clots can get bigger, break loose, and travel through the bloodstream to the lungs. A blood clot in a lung can cause death. Blood clots can form in the veins when you are not active for a long period of time. For example, they can form if you need to stay in bed because of a health problem or must sit for a long time on an airplane or in a car. your doctor recommends it. Blood thinners also may be used if you are likely to develop clots. How is DVT treated? Treatment for DVT usually involves taking blood thinners. These medicines are given through a vein (intravenously, or IV) or as a pill. Talk with your doctor about which medicine is right for you. Your doctor also may suggest that you prop up or elevate your leg when possible, take walks, and wear compression stockings. These measures may help reduce the pain and swelling that can happen with DVT. Follow-up care is a key part of your treatment and safety. Be sure to make and go to all appointments, and call your doctor if you are having problems. It's also a good idea to know your test results and keep a list of the medicines you take. Where can you learn more? Go to https://[employer].Flooved. org and sign in to your QBotix account. Enter A458 in the PeopLease box to learn more about \"Learning About Deep Vein Thrombosis. \"     If you do not have an account, please click on the \"Sign Up Now\" link. Current as of: March 20, 2017  Content Version: 11.3  © 7830-5497 [employer]. Care instructions adapted under license by [facility] ([facility]). If you have questions about a medical condition or this instruction, always ask your healthcare professional. [address] any warranty or liability for your use of this information. Patient Education        Learning About How to Prevent Blood Clots  What is a blood clot? A blood clot is a clump of blood that forms in a blood vessel, such as a vein or an artery. If a clot gets stuck in a blood vessel, it can cause serious problems like a deep vein thrombosis (DVT) or a pulmonary embolism. A DVT is a blood clot in certain veins of the legs, pelvis, or arms. It most often occurs in the legs.  Blood clots in these veins need to be treated, because they can get bigger, break loose, and travel through the bloodstream to the heart and then to the lungs. This causes a pulmonary embolism. A pulmonary embolism is a sudden blockage of an artery in the lung. Blood clots in the deep veins of the leg are the most common cause of a pulmonary embolism. In many cases, the clots are small. They may damage the lung. But if the clot is large and stops blood flow to the lung, it can be deadly. What increases your risk for blood clots? Some of the things that can increase your risk for a blood clot include:  Slowed blood flow  When blood doesn't flow normally, clots are more likely to develop. Reduced blood flow may result from long-term bed rest, such as after a surgery, injury, or serious illness. Or it may result from sitting for a long time, especially when traveling long distances. Abnormal clotting  Some people have blood that clots too easily or too quickly. Problems that may cause increased clotting include:  · Having certain blood problems that make blood clot too easily. This is a problem that may run in families. · Having certain health problems, such as cancer, heart failure, stroke, or severe infection. · Being pregnant. A woman's risk of getting blood clots increases both during pregnancy and shortly after delivery or after a  section. · Using hormonal forms of birth control or hormone therapy. · Smoking. Injury to the blood vessel wall  Blood is more likely to clot in veins and arteries shortly after they are injured. Injury can be caused by a recent medical procedure or surgery that involved your legs, hips, belly, or brain. Or it can be caused by an injury, such as a broken hip. What can you do to prevent blood clots? After any procedure or event that increases your risk  · Take a blood-thinning medicine (called an anticoagulant) as directed if your doctor prescribes one. · Exercise your lower leg muscles to help keep the blood moving through your legs.  Point your toes up toward your head so the calves of your legs are stretched, then relax. Repeat. This is a good exercise to do when you are sitting for long periods of time. · Get up out of bed as soon as you safely can or as soon as your doctor says it's okay after an illness or surgery. If you can't get out of bed, you can do the leg exercise described above. Try to do this leg exercise every hour when you are awake. This will help keep the blood moving through your legs. If you are in the hospital and need to stay in bed, your doctor may have you use a special device that inflates and deflates knee-high boots to help keep blood from pooling in your legs. · Use compression stockings if your doctor prescribes them. These are specially fitted stockings that may prevent blood clots by keeping blood from pooling in your legs. When you travel  · Take breaks when you travel. On long car trips, stop the car and walk around every hour or so. On long bus or train rides or plane flights, get out of your seat and walk up and down the aisle every hour or so. · Do leg exercises while you are seated. For example, pump your feet up and down by pulling your toes up toward your knees and then pointing them down. If you already have a risk of blood clots, talk to your doctor before taking a long trip. Your doctor may want you to wear compression stockings or take blood-thinning medicine. Take care of your body  · Be active. Try to get 30 minutes or more of activity on most days of the week. · Don't smoke. Smoking can increase your risk of blood clots. If you need help quitting, talk to your doctor about stop-smoking programs and medicines. · Check with your doctor about whether you should use hormonal forms of birth control or hormone therapy. These may increase your risk of blood clots. When should you call for help? Call 911 anytime you think you may need emergency care.  For example, call if:  · You have symptoms of a blood clot in your lung (called a pulmonary embolism). These may include:  ¨ Sudden chest pain. ¨ Trouble breathing. ¨ Coughing up blood. Call your doctor now or seek immediate medical care if:  · You have symptoms of a blood clot in your arm or leg (called a deep vein thrombosis). These may include:  ¨ Pain in the arm, calf, back of the knee, thigh, or groin. ¨ Redness and swelling in the arm, leg, or groin. Where can you learn more? Go to https://Crossfader.Heidi [email or web address] org and sign in to your oboxo account. Enter F229 in the Corthera box to learn more about \"Learning About How to Prevent Blood Clots. \"     If you do not have an account, please click on the \"Sign Up Now\" link. Current as of: March 20, 2017  Content Version: 11.3  © 3738-5632 [employer]. Care instructions adapted under license by [facility] ([facility]). If you have questions about a medical condition or this instruction, always ask your healthcare professional. [place] [address] any warranty or liability for your use of this information. Patient Education          rivaroxaban  Pronunciation:  ELIU a GIO a ban  Brand:  Xarelto  What is the most important information I should know about rivaroxaban? You should not use this medicine if you have an artificial heart valve, or if you have active or uncontrolled bleeding. Rivaroxaban can cause a very serious blood clot around your spinal cord if you undergo a spinal tap or receive spinal anesthesia (epidural), especially if you have a genetic spinal defect, if you have a spinal catheter in place, if you have a history of spinal surgery or repeated spinal taps, or if you are also using other drugs that can affect blood clotting. This type of blood clot can lead to long-term or permanent paralysis. Get emergency medical help if you have symptoms of a spinal cord blood clot such as back pain, numbness or muscle weakness in your lower body, or loss of bladder or bowel control.   Do not stop taking rivaroxaban without first talking to your doctor. Stopping suddenly can increase your risk of blood clot or stroke. What is rivaroxaban? Rivaroxaban is an anticoagulant (blood thinner) that prevents the formation of blood clots. Rivaroxaban is used to prevent or treat a type of blood clot called deep vein thrombosis (DVT), which can lead to blood clots in the lungs (pulmonary embolism). A DVT can occur after certain types of surgery. Rivaroxaban is also used in people with atrial fibrillation (a heart rhythm disorder) to lower the risk of stroke caused by a blood clot. Rivaroxaban may also be used for purposes not listed in this medication guide. What should I discuss with my healthcare provider before taking rivaroxaban? You should not use this medication if you are allergic to rivaroxaban, or if you have:  · an artificial heart valve; or  · active or uncontrolled bleeding. Rivaroxaban can cause a very serious blood clot around your spinal cord if you undergo a spinal tap or receive spinal anesthesia (epidural). This type of blood clot could cause long-term paralysis, and may be more likely to occur if:   · you have a genetic spinal defect;  · you have a spinal catheter in place;  · you have a history of spinal surgery or repeated spinal taps;  · you have recently had a spinal tap or epidural anesthesia;  · you are taking an NSAID--Advil, Aleve, Motrin, and others; or  · you are using other medicines to treat or prevent blood clots. Rivaroxaban may cause you to bleed more easily, especially if you have:  · a bleeding disorder that is inherited or caused by disease;  · hemorrhagic stroke;  · uncontrolled high blood pressure;  · stomach or intestinal bleeding or ulcer; or  · if you take certain medicines such as aspirin, heparin, warfarin (Coumadin, [place]), clopidogrel (Plavix), or certain antidepressants.   To make sure you can safely take rivaroxaban, tell your doctor if you have kidney or liver not take extra medicine to make up the missed dose. If you take rivaroxaban 2 times each day: Take the missed dose as soon as you remember. You may take 2 doses at the same time to make up a missed dose. What happens if I overdose? Seek emergency medical attention or call the Poison Help line at 1-975.585.9672. Overdose may cause excessive bleeding. What should I avoid while taking rivaroxaban? Avoid activities that may increase your risk of bleeding or injury. Use extra care to prevent bleeding while shaving or brushing your teeth. What are the possible side effects of rivaroxaban? Get emergency medical help if you have signs of an allergic reaction: hives; difficult breathing; swelling of your face, lips, tongue, or throat. Also seek emergency medical attention if you have symptoms of a spinal blood clot: back pain, numbness or muscle weakness in your lower body, or loss of bladder or bowel control. Call your doctor at once if you have:  · easy bruising or bleeding (nosebleeds, bleeding gums, heavy menstrual bleeding);  · pain, swelling, or drainage from a wound or where a needle was injected in your skin;  · bleeding from wounds or needle injections, any bleeding that will not stop;  · headache, dizziness, weakness, feeling like you might pass out;  · urine that looks red, pink, or brown; or  · bloody or tarry stools, coughing up blood or vomit that looks like coffee grounds. Common side effects may include:  · muscle pain;  · itching; or  · pain in your arms or legs. This is not a complete list of side effects and others may occur. Call your doctor for medical advice about side effects. You may report side effects to FDA at 7-822-IGZ-0042. What other drugs will affect rivaroxaban?   Tell your doctor about all medicines you use, and those you start or stop using during your treatment with rivaroxaban, especially:  · rifampin;  · Mahin's wort;  · antifungal medication --itraconazole or drug interactions, allergic reactions, or adverse effects. If you have questions about the drugs you are taking, check with your doctor, nurse or pharmacist.  Copyright 2693-1647 [address]: 2.08. Revision date: 5/24/2016. Care instructions adapted under license by [facility] ([facility]). If you have questions about a medical condition or this instruction, always ask your healthcare professional. [place] [address] any warranty or liability for your use of this information.

## 2017-11-01 NOTE — PROGRESS NOTES
Subjective:      Patient ID: Franky Davenport is a 61 y.o. male. Chief Complaint   Patient presents with   [facility] ED Follow-up     Patient was seen at [facility] ER with dvt       HPI    Pt states that approx 1mo ago he began workup for low back pain (injury occurred at work on Oct 1 and MRI findings that was worked up here/neurosurg office. Pt has been nearly full bedrest with pain for 1mo. Pt was at neurosurgeon's office yesterday and was sent for u/s of leg and found to have 5 clots. Pt was then sent to ER and was told that since this is below knee pt was safer and started on xarelto and sent home. Pt and wife state pt is/was wanting to stop Percocet and was given ibuprofen 800mg but concerned that now that he has started Xarelto he is too scared to take. Pt states that his back pain is slowly getting better \"a lot better\"   No soa, no chest pain  Pt is a smoker    Leg pain-- 2 or 3 when sitting, this a 7-8 when standing            Review of Systems   Constitutional: Positive for fatigue. Respiratory: Negative for shortness of breath. Cardiovascular: Positive for leg swelling. Negative for chest pain. Gastrointestinal: Negative for anal bleeding and constipation. Genitourinary: Negative for hematuria. Musculoskeletal:        R leg pain     Past Medical History:   Diagnosis Date    GERD (gastroesophageal reflux disease)     Hyperlipidemia     Hypertension      Current Outpatient Prescriptions on File Prior to Visit   Medication Sig Dispense Refill    ibuprofen (ADVIL;MOTRIN) 800 MG tablet Take 1 tablet by mouth every 6 hours as needed for Pain 120 tablet 3    rivaroxaban (XARELTO) 15 MG TABS tablet Take 1 tablet by mouth 2 times daily (with meals) for 21 days 42 tablet 0    oxyCODONE-acetaminophen (PERCOCET) 5-325 MG per tablet Take 1 tablet by mouth every 6 hours as needed for Pain (TAKE 2 A DAY) .  60 tablet 0    nabumetone (RELAFEN) 500 MG tablet Take 1 tablet by mouth 2 times daily For 118   Temp 98.7 °F (37.1 °C) (Temporal)   Resp 16   SpO2 98%     Assessment:       1. Acute deep vein thrombosis (DVT) of right lower extremity, unspecified vein (HCC)  rivaroxaban (XARELTO) 20 MG TABS tablet    acetaminophen (TYLENOL 8 HOUR ARTHRITIS PAIN) 650 MG extended release tablet    CTA PULMONARY W CONTRAST           Plan:    Stop smoking! Pt to continue current dose of Xarelto  Then day 22, pt understands to change to 20mg xarelto daily. Pt has percocet 5 mg to take for severe pain, otherwise he is hoping to use tylenol (above) instead.     May begin limited wt bearing and then elevating leg  ANY SOA, CP or bleeding=ER  Edu regarding med and risks has been discussed, benefit outweighs risk  F/u in 2wks and pending test results

## 2017-11-02 ENCOUNTER — OFFICE VISIT (OUTPATIENT)
Dept: FAMILY MEDICINE CLINIC | Age: 57
End: 2017-11-02
Payer: COMMERCIAL

## 2017-11-02 ENCOUNTER — TELEPHONE (OUTPATIENT)
Dept: FAMILY MEDICINE CLINIC | Age: 57
End: 2017-11-02

## 2017-11-02 VITALS
HEIGHT: 66 IN | SYSTOLIC BLOOD PRESSURE: 138 MMHG | BODY MASS INDEX: 34.07 KG/M2 | TEMPERATURE: 97.9 F | WEIGHT: 212 LBS | HEART RATE: 76 BPM | DIASTOLIC BLOOD PRESSURE: 60 MMHG | OXYGEN SATURATION: 98 % | RESPIRATION RATE: 16 BRPM

## 2017-11-02 DIAGNOSIS — I82.4Y9 ACUTE DEEP VEIN THROMBOSIS (DVT) OF PROXIMAL VEIN OF LOWER EXTREMITY, UNSPECIFIED LATERALITY (HCC): ICD-10-CM

## 2017-11-02 DIAGNOSIS — R42 DIZZY SPELLS: Primary | ICD-10-CM

## 2017-11-02 PROCEDURE — 99213 OFFICE O/P EST LOW 20 MIN: CPT | Performed by: FAMILY MEDICINE

## 2017-11-02 NOTE — TELEPHONE ENCOUNTER
Patient saw Zuly Walker yesterday, was diagnosed with 5 blood clots in leg day before and started Xarelto. He called this morning stating he was very dizzy, but could be \"overreacting\" and thinks the Xarelto is causing dizziness. I spoke with Jony Frederick as Leonila isn't here today, he is on your scheduled for 10:30 am today.

## 2017-11-02 NOTE — PROGRESS NOTES
nervous/anxious. Past Medical History:   Diagnosis Date    GERD (gastroesophageal reflux disease)     Hyperlipidemia     Hypertension        Current Outpatient Prescriptions   Medication Sig Dispense Refill    [START ON 11/22/2017] rivaroxaban (XARELTO) 20 MG TABS tablet Take 1 tablet by mouth daily (with breakfast) 30 tablet 5    acetaminophen (TYLENOL 8 HOUR ARTHRITIS PAIN) 650 MG extended release tablet Take 1 tablet by mouth every 8 hours as needed for Pain 60 tablet 0    rivaroxaban (XARELTO) 15 MG TABS tablet Take 1 tablet by mouth 2 times daily (with meals) for 21 days 42 tablet 0    oxyCODONE-acetaminophen (PERCOCET) 5-325 MG per tablet Take 1 tablet by mouth every 6 hours as needed for Pain (TAKE 2 A DAY) . 60 tablet 0    aspirin 81 MG tablet Take 81 mg by mouth daily      MULTIPLE VITAMIN PO Take by mouth daily      olmesartan (BENICAR) 40 MG tablet Take 1 tablet by mouth daily 90 tablet 1    amLODIPine (NORVASC) 5 MG tablet Take 1 tablet by mouth daily 90 tablet 1    atorvastatin (LIPITOR) 40 MG tablet TAKE 1 TABLET BY MOUTH AT BEDTIME (Patient taking differently: Take 20 mg by mouth TAKE 1 TABLET BY MOUTH AT BEDTIME) 90 tablet 1    omeprazole (PRILOSEC) 20 MG delayed release capsule Take 1 capsule by mouth every morning (before breakfast) 90 capsule 1    chlorthalidone (HYGROTON) 25 MG tablet TAKE ONE 1/2 TABLET BY MOUTH DAILY. 45 tablet 1     No current facility-administered medications for this visit.         Allergies   Allergen Reactions    Penicillins Hives       Past Surgical History:   Procedure Laterality Date    FINGER SURGERY      OTHER SURGICAL HISTORY  08/2017    colonoscopy done at [facility] COLONOSCOPY W/BIOPSY SINGLE/MULTIPLE N/A 6/19/2017    Dr Aleena Nieto x 4--5 yr recall       Social History   Substance Use Topics    Smoking status: Current Every Day Smoker     Packs/day: 2.00     Years: 45.00     Types: Cigarettes    Smokeless tobacco: Never Used   [place] Alcohol use No       Family History   Problem Relation Age of Onset    High Blood Pressure Mother     Cancer Mother        /60 (Site: Right Arm, Position: Sitting, Cuff Size: Large Adult)   Pulse 76   Temp 97.9 °F (36.6 °C) (Temporal)   Resp 16   Ht 5' 6\" (1.676 m)   Wt 212 lb (96.2 kg)   SpO2 98%   BMI 34.22 kg/m²     Physical Exam   Constitutional: He is oriented to person, place, and time. He appears well-developed and well-nourished. No distress. HENT:   Head: Normocephalic and atraumatic. Right Ear: Hearing, tympanic membrane, external ear and ear canal normal.   Left Ear: Hearing, tympanic membrane, external ear and ear canal normal.   Nose: Nose normal.   Eyes: Conjunctivae and EOM are normal. Right eye exhibits no discharge. Left eye exhibits no discharge. No scleral icterus. Neck: Normal range of motion. Neck supple. No tracheal deviation present. No thyromegaly present. Cardiovascular: Normal rate, regular rhythm, normal heart sounds and intact distal pulses. Exam reveals no gallop and no friction rub. No murmur heard. Pulmonary/Chest: Effort normal and breath sounds normal. No respiratory distress. He has no wheezes. He has no rales. Abdominal: Soft. Bowel sounds are normal. He exhibits no distension. There is no tenderness. Musculoskeletal: Normal range of motion. He exhibits no edema or deformity. Lymphadenopathy:     He has no cervical adenopathy. Neurological: He is alert and oriented to person, place, and time. No cranial nerve deficit. Skin: Skin is warm and dry. No rash noted. He is not diaphoretic. No erythema. Psychiatric: He has a normal mood and affect. His behavior is normal. Thought content normal.   Nursing note and vitals reviewed. Assessment:    ICD-10-CM ICD-9-CM    1. Dizzy spells R42 780.4    2.  Acute deep vein thrombosis (DVT) of proximal vein of lower extremity, unspecified laterality (HCC) I82.4Y9 453.41     on xarelto Plan:  Discussed likely causes of his transient dizzy spells each morning and discussed symptomatic treatment. Discussed proper use of medication  Discussed signs and symptoms requiring medical attention. All questions were answered and patient voiced understanding and agreement with plan as discussed. No orders of the defined types were placed in this encounter. No orders of the defined types were placed in this encounter. Medications Discontinued During This Encounter   Medication Reason    ibuprofen (ADVIL;MOTRIN) 800 MG tablet     nabumetone (RELAFEN) 500 MG tablet      Patient Instructions   Patient given educational handouts and has had all questions answered. Patient voices understanding and agrees to plans along with risks and benefits of plan. Patient is instructed to continue prior meds, diet, and exercise plans as instructed. Patient agrees to follow up as instructed and sooner if needed. Patient agrees to go to ER if condition becomes emergent. Return if symptoms worsen or fail to improve, for next scheduled follow up with PCP.

## 2017-11-04 ASSESSMENT — ENCOUNTER SYMPTOMS
ABDOMINAL PAIN: 0
DIARRHEA: 0
COUGH: 0
EYE PAIN: 0
CONSTIPATION: 0
EYE DISCHARGE: 0
BACK PAIN: 0
VOMITING: 0
SORE THROAT: 0
SHORTNESS OF BREATH: 0
WHEEZING: 0
RHINORRHEA: 0
NAUSEA: 0

## 2017-11-04 NOTE — PATIENT INSTRUCTIONS
Patient Education        Dizziness: Care Instructions  Your Care Instructions  Dizziness is the feeling of unsteadiness or fuzziness in your head. It is different than having vertigo, which is a feeling that the room is spinning or that you are moving or falling. It is also different from lightheadedness, which is the feeling that you are about to faint. It can be hard to know what causes dizziness. Some people feel dizzy when they have migraine headaches. Sometimes bouts of flu can make you feel dizzy. Some medical conditions, such as heart problems or high blood pressure, can make you feel dizzy. Many medicines can cause dizziness, including medicines for high blood pressure, pain, or anxiety. If a medicine causes your symptoms, your doctor may recommend that you stop or change the medicine. If it is a problem with your heart, you may need medicine to help your heart work better. If there is no clear reason for your symptoms, your doctor may suggest watching and waiting for a while to see if the dizziness goes away on its own. Follow-up care is a key part of your treatment and safety. Be sure to make and go to all appointments, and call your doctor if you are having problems. It's also a good idea to know your test results and keep a list of the medicines you take. How can you care for yourself at home? · If your doctor recommends or prescribes medicine, take it exactly as directed. Call your doctor if you think you are having a problem with your medicine. · Do not drive while you feel dizzy. · Try to prevent falls. Steps you can take include:  ¨ Using nonskid mats, adding grab bars near the tub, and using night-lights. ¨ Clearing your home so that walkways are free of anything you might trip on. ¨ Letting family and friends know that you have been feeling dizzy. This will help them know how to help you. When should you call for help? Call 911 anytime you think you may need emergency care.  For

## 2017-11-10 ENCOUNTER — TELEPHONE (OUTPATIENT)
Dept: FAMILY MEDICINE CLINIC | Age: 57
End: 2017-11-10

## 2017-11-10 NOTE — TELEPHONE ENCOUNTER
Patient called, said he was having difficulty breathing last night and went to [facility] ER in [place]. They did a CTA pulmonary, I have cancelled one scheduled for next week at [facility]. I have also requested records from ER visit be faxed to this office.

## 2017-11-15 ENCOUNTER — OFFICE VISIT (OUTPATIENT)
Dept: FAMILY MEDICINE CLINIC | Age: 57
End: 2017-11-15
Payer: COMMERCIAL

## 2017-11-15 ENCOUNTER — TELEPHONE (OUTPATIENT)
Dept: FAMILY MEDICINE CLINIC | Age: 57
End: 2017-11-15

## 2017-11-15 VITALS
DIASTOLIC BLOOD PRESSURE: 72 MMHG | OXYGEN SATURATION: 98 % | SYSTOLIC BLOOD PRESSURE: 128 MMHG | HEART RATE: 127 BPM | TEMPERATURE: 99 F | RESPIRATION RATE: 16 BRPM

## 2017-11-15 DIAGNOSIS — Z79.899 MEDICATION MANAGEMENT: ICD-10-CM

## 2017-11-15 DIAGNOSIS — M54.50 LOW BACK PAIN WITH RADIATION: ICD-10-CM

## 2017-11-15 DIAGNOSIS — I82.4Z1 ACUTE DEEP VEIN THROMBOSIS (DVT) OF DISTAL END OF RIGHT LOWER EXTREMITY (HCC): ICD-10-CM

## 2017-11-15 DIAGNOSIS — I82.4Z1 ACUTE DEEP VEIN THROMBOSIS (DVT) OF DISTAL END OF RIGHT LOWER EXTREMITY (HCC): Primary | ICD-10-CM

## 2017-11-15 LAB
AMPHETAMINE SCREEN, URINE: NEGATIVE
BARBITURATE SCREEN URINE: NEGATIVE
BENZODIAZEPINE SCREEN, URINE: NEGATIVE
CANNABINOID SCREEN URINE: NEGATIVE
COCAINE METABOLITE SCREEN URINE: NEGATIVE
Lab: NORMAL
OPIATE SCREEN URINE: NEGATIVE

## 2017-11-15 PROCEDURE — 99214 OFFICE O/P EST MOD 30 MIN: CPT | Performed by: NURSE PRACTITIONER

## 2017-11-15 RX ORDER — OXYCODONE HYDROCHLORIDE AND ACETAMINOPHEN 5; 325 MG/1; MG/1
1 TABLET ORAL EVERY 6 HOURS PRN
Qty: 12 TABLET | Refills: 0 | Status: SHIPPED | OUTPATIENT
Start: 2017-11-15 | End: 2017-11-21 | Stop reason: SDUPTHER

## 2017-11-15 RX ORDER — KETOROLAC TROMETHAMINE 10 MG/1
10 TABLET, FILM COATED ORAL EVERY 8 HOURS PRN
COMMUNITY
End: 2017-11-15 | Stop reason: ALTCHOICE

## 2017-11-15 RX ORDER — NABUMETONE 500 MG/1
500 TABLET, FILM COATED ORAL 2 TIMES DAILY
COMMUNITY
End: 2017-11-15

## 2017-11-15 ASSESSMENT — ENCOUNTER SYMPTOMS
SHORTNESS OF BREATH: 0
DIARRHEA: 0
BLOOD IN STOOL: 0
BACK PAIN: 1
CONSTIPATION: 0

## 2017-11-15 NOTE — TELEPHONE ENCOUNTER
called and stated patient could not obtain xarelto through [facility] and requested that script be called in to [facility] Luis Barrios in [facility]. [phone]. This has been taken care of.

## 2017-11-15 NOTE — PROGRESS NOTES
Subjective:      Patient ID: Nik Arcos is a 61 y.o. male. Chief Complaint   Patient presents with    Follow-up     acute dvt right lower extremity. Patient is here today with his wife and . Patient reports that he is not doing much better. Patient reports pain in right foot. HPI    Patient is here today with his spouse and his  for his work comp claim. Patient reports that he is having ongoing right lower extremity pain. He is also experiencing continued low back pain with radiating symptoms into right buttock and right hip and describes pain all the way down to his right foot. Patient has seen neurosurgeon recently and will have follow-up with them on the 30th of this month. Patient has had Percocet over the last month to help control pain and now states that he is out of this medication and Tylenol extra strength is not helping with pain control. Patient describes his right leg pain as ongoing and unchanged from the beginning of the findings of DVT. Ultrasound of right lower extremity was done on 10/31/2017 showing evidence of subacute deep deep vein thrombosis in the right lower extremity which involves the popliteal, peroneal and posterior tibial,  Gastrocnemius, and soleal veins. Pt has reported to [facility] that last Percocet was 2days ago. Patient has had ER visits since her last evaluation. Patient states he began feeling a sense of shortness of breath and felt it best to go to the emergency room. At that time he did have CT of chest which was negative for pulmonary embolism. There was a CT chest for PE protocol that had been ordered here within the office however he has not had that done since he did have it done at the ER. Pt is unable to work at this time in relation to this ongoing leg pain. Review of Systems   Respiratory: Negative for shortness of breath. Cardiovascular: Negative for chest pain.    Gastrointestinal: Negative for blood in stool, constipation and diarrhea. Musculoskeletal: Positive for back pain. R foot pain; right leg pain   Neurological: Negative for dizziness (states resolved). Past Medical History:   Diagnosis Date    GERD (gastroesophageal reflux disease)     Hyperlipidemia     Hypertension      Current Outpatient Prescriptions on File Prior to Visit   Medication Sig Dispense Refill    acetaminophen (TYLENOL 8 HOUR ARTHRITIS PAIN) 650 MG extended release tablet Take 1 tablet by mouth every 8 hours as needed for Pain 60 tablet 0    rivaroxaban (XARELTO) 15 MG TABS tablet Take 1 tablet by mouth 2 times daily (with meals) for 21 days 42 tablet 0    olmesartan (BENICAR) 40 MG tablet Take 1 tablet by mouth daily 90 tablet 1    amLODIPine (NORVASC) 5 MG tablet Take 1 tablet by mouth daily 90 tablet 1    atorvastatin (LIPITOR) 40 MG tablet TAKE 1 TABLET BY MOUTH AT BEDTIME (Patient taking differently: Take 20 mg by mouth TAKE 1 TABLET BY MOUTH AT BEDTIME) 90 tablet 1    omeprazole (PRILOSEC) 20 MG delayed release capsule Take 1 capsule by mouth every morning (before breakfast) 90 capsule 1    chlorthalidone (HYGROTON) 25 MG tablet TAKE ONE 1/2 TABLET BY MOUTH DAILY. 45 tablet 1    [START ON 11/22/2017] rivaroxaban (XARELTO) 20 MG TABS tablet Take 1 tablet by mouth daily (with breakfast) 30 tablet 5    oxyCODONE-acetaminophen (PERCOCET) 5-325 MG per tablet Take 1 tablet by mouth every 6 hours as needed for Pain (TAKE 2 A DAY) . 60 tablet 0    aspirin 81 MG tablet Take 81 mg by mouth daily      MULTIPLE VITAMIN PO Take by mouth daily       No current facility-administered medications on file prior to visit. Allergies   Allergen Reactions    Penicillins Hives       Objective:   Physical Exam   Constitutional: He is oriented to person, place, and time. He appears well-developed and well-nourished. HENT:   Head: Normocephalic and atraumatic.    Eyes: Conjunctivae and EOM are normal. Pupils are equal, round, and

## 2017-11-21 ENCOUNTER — OFFICE VISIT (OUTPATIENT)
Dept: FAMILY MEDICINE CLINIC | Age: 57
End: 2017-11-21
Payer: COMMERCIAL

## 2017-11-21 VITALS
RESPIRATION RATE: 18 BRPM | TEMPERATURE: 98 F | HEIGHT: 66 IN | OXYGEN SATURATION: 96 % | DIASTOLIC BLOOD PRESSURE: 74 MMHG | SYSTOLIC BLOOD PRESSURE: 130 MMHG | HEART RATE: 123 BPM | BODY MASS INDEX: 33.75 KG/M2 | WEIGHT: 210 LBS

## 2017-11-21 DIAGNOSIS — I82.4Z1 ACUTE DEEP VEIN THROMBOSIS (DVT) OF DISTAL END OF RIGHT LOWER EXTREMITY (HCC): ICD-10-CM

## 2017-11-21 PROCEDURE — 99213 OFFICE O/P EST LOW 20 MIN: CPT | Performed by: NURSE PRACTITIONER

## 2017-11-21 RX ORDER — OXYCODONE HYDROCHLORIDE AND ACETAMINOPHEN 5; 325 MG/1; MG/1
1 TABLET ORAL EVERY 6 HOURS PRN
Qty: 12 TABLET | Refills: 0 | Status: SHIPPED | OUTPATIENT
Start: 2017-11-21 | End: 2017-11-24

## 2017-11-21 ASSESSMENT — ENCOUNTER SYMPTOMS
SHORTNESS OF BREATH: 0
BACK PAIN: 1
EYES NEGATIVE: 1
RESPIRATORY NEGATIVE: 1
ALLERGIC/IMMUNOLOGIC NEGATIVE: 1
CONSTIPATION: 1

## 2017-11-21 NOTE — PROGRESS NOTES
Subjective:    HPI   Here today for pain med  Patient is here today with his spouse and his  for his work comp claim. Patient reports that he is having ongoing right lower extremity pain. He is also experiencing continued low back pain with radiating symptoms into right buttock and right hip and describes pain all the way down to his right foot. Patient has seen neurosurgeon recently and will have follow-up with them on the 30th of this month. Patient has had Percocet over the last month to help control pain and now states that he is out of this medication and Tylenol extra strength is not helping with pain control. Patient describes his right leg pain as ongoing and unchanged from the beginning of the findings of DVT. Ultrasound of right lower extremity was done on 10/31/2017 showing evidence of subacute deep deep vein thrombosis in the right lower extremity which involves the popliteal, peroneal and posterior tibial,  Gastrocnemius, and soleal veins. Copied OV from Andrew Srinivasan      He is here today because he has 5 blood clots in the right leg. Has back pain was seeing Dr. Jose Almonte. Was on bedrest and developed blood clots OV with Dr. Jose Almonte Identified problems with his right leg. sent to [place]  Given at that time Ibuprofen once US seen blood clots unable to take Ibuprofen placed on Xarelto. Now needing percocet. Waiting Consult with [facility] Dr. Elen Root. Sees Vascular tomorrow at [facility]. Did call Neuro to get pain med and was told to go to pain management. Patient ID: Anne-Marie Brunner is a 61 y.o. male.   Chief Complaint   Patient presents with    Back Pain     Medication Refill     Current Outpatient Prescriptions on File Prior to Visit   Medication Sig Dispense Refill    [START ON 11/22/2017] rivaroxaban (XARELTO) 20 MG TABS tablet Take 1 tablet by mouth daily (with breakfast) 30 tablet 5    acetaminophen (TYLENOL 8 HOUR ARTHRITIS PAIN) 650 MG extended release tablet Take 1 tablet by mouth every 8 hours as

## 2017-11-22 ENCOUNTER — HOSPITAL ENCOUNTER (OUTPATIENT)
Dept: VASCULAR LAB | Age: 57
Discharge: HOME | End: 2017-11-22
Payer: COMMERCIAL

## 2017-11-22 ENCOUNTER — OFFICE VISIT (OUTPATIENT)
Dept: VASCULAR SURGERY | Age: 57
End: 2017-11-22
Payer: COMMERCIAL

## 2017-11-22 VITALS
RESPIRATION RATE: 18 BRPM | DIASTOLIC BLOOD PRESSURE: 78 MMHG | HEART RATE: 118 BPM | TEMPERATURE: 98.1 F | SYSTOLIC BLOOD PRESSURE: 130 MMHG

## 2017-11-22 DIAGNOSIS — I82.431 DEEP VEIN THROMBOSIS (DVT) OF POPLITEAL VEIN OF RIGHT LOWER EXTREMITY, UNSPECIFIED CHRONICITY (HCC): Primary | ICD-10-CM

## 2017-11-22 DIAGNOSIS — M79.604 PAIN OF RIGHT LOWER EXTREMITY: Primary | ICD-10-CM

## 2017-11-22 DIAGNOSIS — M79.604 PAIN OF RIGHT LOWER EXTREMITY: ICD-10-CM

## 2017-11-22 DIAGNOSIS — M79.89 LEG SWELLING: ICD-10-CM

## 2017-11-22 PROCEDURE — 93971 EXTREMITY STUDY: CPT

## 2017-11-22 PROCEDURE — 99203 OFFICE O/P NEW LOW 30 MIN: CPT | Performed by: PHYSICIAN ASSISTANT

## 2017-11-22 NOTE — PROGRESS NOTES
rivaroxaban (XARELTO) 15 MG TABS tablet Take 1 tablet by mouth 2 times daily (with meals) for 21 days 42 tablet 0     No current facility-administered medications for this visit. Allergies: Penicillins  Past Medical History:   Diagnosis Date    GERD (gastroesophageal reflux disease)     Hyperlipidemia     Hypertension      Past Surgical History:   Procedure Laterality Date    FINGER SURGERY      OTHER SURGICAL HISTORY  08/2017    colonoscopy done at [facility] COLONOSCOPY W/BIOPSY SINGLE/MULTIPLE N/A 6/19/2017    Dr Louis Carry x 4--5 yr recall     Family History   Problem Relation Age of Onset    High Blood Pressure Mother     Cancer Mother      Social History   Substance Use Topics    Smoking status: Current Every Day Smoker     Packs/day: 1.00     Years: 45.00     Types: Cigarettes    Smokeless tobacco: Never Used    Alcohol use No       Old records have been obtained from the referring. These records have been reviewed and summarized. Review of Systems    Constitutional  no significant activity change, appetite change, or unexpected weight change. No fever or chills. No diaphoresis or significant fatigue. HENT  no significant rhinorrhea or epistaxis. No tinnitus or significant hearing loss. Eyes  no sudden vision change or amaurosis. Respiratory  no significant shortness of breath, wheezing, or stridor. No apnea, cough, or chest tightness associated with shortness of breath. Cardiovascular  no chest pain, syncope, or significant dizziness. No palpitations. No claudication. No significant LE  edema. Gastrointestinal  no abdominal swelling or pain. No blood in stool. No severe constipation, diarrhea, nausea, or vomiting. Genitourinary  No difficulty urinating, dysuria, frequency, or urgency. No flank pain or hematuria. Musculoskeletal  reports  back pain, gait disturbance, or myalgia. Skin  no color change, rash, pallor, or new wound.   Neurologic  no dizziness, facial asymmetry, or light headedness. No seizures. No speech difficulty or lateralizing weakness. Hematologic  no easy bruising or excessive bleeding. Psychiatric  no severe anxiety or nervousness. No confusion. All other review of systems are negative. Physical Exam    /78 Comment: left  Pulse 118   Temp 98.1 °F (36.7 °C)   Resp 18     Constitutional  well developed, well nourished. No diaphoresis or acute distress. HENT  head normocephalic. Right external ear canal appears normal.  Left external ear canal appears normal.  Septum appears midline. Eyes  conjunctiva normal.  EOMS normal.  No exudate. No icterus. Neck- ROM appears normal, no tracheal deviation. Cardiovascular  Regular rate and rhythm. Heart sounds are normal.  No murmur, rub, or gallop. Carotid pulses are 2+ to palpation bilaterally without bruit. Extremities - Radial and brachial pulses are 2+ to palpation bilaterally. DP, PT perneal pulses No cyanosis, clubbing, or significant edema. No signs atheroembolic event. Pulmonary  effort appears normal.  No respiratory distress. Lungs - Breath sounds normal. No wheezes or rales. GI - Abdomen  soft, non tender, bowel sounds X 4 quadrants. No guarding or rebound tenderness. No distension or palpable mass. Genitourinary  deferred. Musculoskeletal  ROM appears normal.  No significant  edema. Neurologic  alert and oriented X 3. Physiologic. Skin  warm, dry, and intact. No rash, erythema, or pallor. Psychiatric  mood, affect, and behavior appear normal.  Judgment and thought processes appear normal.    Venous Scan:   There is evidence of subacute partially occlusive deep vein thrombosis in    the right lower extremity involving the popliteal vein. This has improved    and nearly completely recanalized compared to the exam 10/31/2017.  No clot    seen in the right gastrocnemius vein which is also better than prior exam.   Governor Pod is evidence of subacute deep vein thrombosis in the right lower    extremity involving the peroneal, posterior tibial, and soleal veins. This    is unchanged compared to exam 10/31/2017.   Ramona Adas is no evidence of superficial thrombophlebitis of the right lower    extremity(ies). Test results reviewed with the patient. Options have been discussed with the patient including continued medical management. Patient has opted to proceed with continued medical management. Assessment    1.  Deep vein thrombosis (DVT) of popliteal vein of right lower extremity, unspecified chronicity (HCC)          Plan    Continue Xarelto 20 mg daily  Follow up  In 3 months with a repeat right leg venous scan

## 2017-11-29 ENCOUNTER — TELEPHONE (OUTPATIENT)
Dept: NEUROSURGERY | Age: 57
End: 2017-11-29

## 2017-12-20 ENCOUNTER — TELEPHONE (OUTPATIENT)
Dept: FAMILY MEDICINE CLINIC | Age: 57
End: 2017-12-20

## 2017-12-20 NOTE — TELEPHONE ENCOUNTER
Patient is having a Right L5 TFESI, by Dr. Geena Herzog, needs cardiac clearance to come off of Xarelto, and needs our fax number to fax form. Left message with Jayesh Cook with our fax #.

## 2018-02-09 ENCOUNTER — OFFICE VISIT (OUTPATIENT)
Dept: FAMILY MEDICINE CLINIC | Age: 58
End: 2018-02-09
Payer: COMMERCIAL

## 2018-02-09 VITALS
HEART RATE: 91 BPM | OXYGEN SATURATION: 98 % | WEIGHT: 220 LBS | TEMPERATURE: 98 F | SYSTOLIC BLOOD PRESSURE: 138 MMHG | RESPIRATION RATE: 16 BRPM | DIASTOLIC BLOOD PRESSURE: 80 MMHG | BODY MASS INDEX: 35.51 KG/M2

## 2018-02-09 DIAGNOSIS — Z11.59 ENCOUNTER FOR HEPATITIS C SCREENING TEST FOR LOW RISK PATIENT: ICD-10-CM

## 2018-02-09 DIAGNOSIS — I82.401 ACUTE DEEP VEIN THROMBOSIS (DVT) OF RIGHT LOWER EXTREMITY, UNSPECIFIED VEIN (HCC): ICD-10-CM

## 2018-02-09 DIAGNOSIS — Z11.4 SCREENING FOR HIV WITHOUT PRESENCE OF RISK FACTORS: ICD-10-CM

## 2018-02-09 DIAGNOSIS — K21.00 GASTROESOPHAGEAL REFLUX DISEASE WITH ESOPHAGITIS: ICD-10-CM

## 2018-02-09 DIAGNOSIS — I10 ESSENTIAL HYPERTENSION: ICD-10-CM

## 2018-02-09 DIAGNOSIS — E78.2 MIXED HYPERLIPIDEMIA: ICD-10-CM

## 2018-02-09 DIAGNOSIS — Z12.5 SCREENING FOR PROSTATE CANCER: ICD-10-CM

## 2018-02-09 DIAGNOSIS — I10 ESSENTIAL HYPERTENSION: Primary | ICD-10-CM

## 2018-02-09 DIAGNOSIS — F17.210 NICOTINE DEPENDENCE, CIGARETTES, UNCOMPLICATED: ICD-10-CM

## 2018-02-09 LAB
ALBUMIN SERPL-MCNC: 4.2 G/DL (ref 3.5–5.2)
ALP BLD-CCNC: 79 U/L (ref 40–130)
ALT SERPL-CCNC: 26 U/L (ref 5–41)
ANION GAP SERPL CALCULATED.3IONS-SCNC: 16 MMOL/L (ref 7–19)
AST SERPL-CCNC: 19 U/L (ref 5–40)
BASOPHILS ABSOLUTE: 0.1 K/UL (ref 0–0.2)
BASOPHILS RELATIVE PERCENT: 1.1 % (ref 0–1)
BILIRUB SERPL-MCNC: 0.4 MG/DL (ref 0.2–1.2)
BILIRUBIN URINE: NEGATIVE
BLOOD, URINE: NEGATIVE
BUN BLDV-MCNC: 18 MG/DL (ref 6–20)
CALCIUM SERPL-MCNC: 9.4 MG/DL (ref 8.6–10)
CHLORIDE BLD-SCNC: 102 MMOL/L (ref 98–111)
CHOLESTEROL, TOTAL: 155 MG/DL (ref 160–199)
CLARITY: CLEAR
CO2: 26 MMOL/L (ref 22–29)
COLOR: YELLOW
CREAT SERPL-MCNC: 0.7 MG/DL (ref 0.5–1.2)
EOSINOPHILS ABSOLUTE: 0.3 K/UL (ref 0–0.6)
EOSINOPHILS RELATIVE PERCENT: 3.5 % (ref 0–5)
GFR NON-AFRICAN AMERICAN: >60
GLUCOSE BLD-MCNC: 95 MG/DL (ref 74–109)
GLUCOSE URINE: NEGATIVE MG/DL
HCT VFR BLD CALC: 46.5 % (ref 42–52)
HDLC SERPL-MCNC: 41 MG/DL (ref 55–121)
HEMOGLOBIN: 15.6 G/DL (ref 14–18)
KETONES, URINE: NEGATIVE MG/DL
LDL CHOLESTEROL CALCULATED: 90 MG/DL
LEUKOCYTE ESTERASE, URINE: NEGATIVE
LYMPHOCYTES ABSOLUTE: 2.8 K/UL (ref 1.1–4.5)
LYMPHOCYTES RELATIVE PERCENT: 33.3 % (ref 20–40)
MCH RBC QN AUTO: 32.8 PG (ref 27–31)
MCHC RBC AUTO-ENTMCNC: 33.5 G/DL (ref 33–37)
MCV RBC AUTO: 97.7 FL (ref 80–94)
MONOCYTES ABSOLUTE: 0.8 K/UL (ref 0–0.9)
MONOCYTES RELATIVE PERCENT: 8.9 % (ref 0–10)
NEUTROPHILS ABSOLUTE: 4.4 K/UL (ref 1.5–7.5)
NEUTROPHILS RELATIVE PERCENT: 52.8 % (ref 50–65)
NITRITE, URINE: NEGATIVE
PDW BLD-RTO: 12.8 % (ref 11.5–14.5)
PH UA: 6
PLATELET # BLD: 187 K/UL (ref 130–400)
PMV BLD AUTO: 10.8 FL (ref 9.4–12.4)
POTASSIUM SERPL-SCNC: 3.8 MMOL/L (ref 3.5–5)
PROSTATE SPECIFIC ANTIGEN: 0.8 NG/ML (ref 0–4)
PROTEIN UA: ABNORMAL MG/DL
RBC # BLD: 4.76 M/UL (ref 4.7–6.1)
SODIUM BLD-SCNC: 144 MMOL/L (ref 136–145)
SPECIFIC GRAVITY UA: 1.03
T4 FREE: 1.2 NG/DL (ref 0.9–1.7)
TOTAL PROTEIN: 6.7 G/DL (ref 6.6–8.7)
TRIGL SERPL-MCNC: 119 MG/DL (ref 0–149)
TSH SERPL DL<=0.05 MIU/L-ACNC: 0.92 UIU/ML (ref 0.27–4.2)
URINE REFLEX TO CULTURE: ABNORMAL
UROBILINOGEN, URINE: 0.2 E.U./DL
WBC # BLD: 8.4 K/UL (ref 4.8–10.8)

## 2018-02-09 PROCEDURE — G0296 VISIT TO DETERM LDCT ELIG: HCPCS | Performed by: NURSE PRACTITIONER

## 2018-02-09 PROCEDURE — 99214 OFFICE O/P EST MOD 30 MIN: CPT | Performed by: NURSE PRACTITIONER

## 2018-02-09 RX ORDER — CHLORTHALIDONE 25 MG/1
TABLET ORAL
Qty: 90 TABLET | Refills: 1 | Status: SHIPPED | OUTPATIENT
Start: 2018-02-09 | End: 2018-05-21 | Stop reason: SDUPTHER

## 2018-02-09 RX ORDER — GABAPENTIN 300 MG/1
900 CAPSULE ORAL 3 TIMES DAILY
COMMUNITY
Start: 2018-01-23 | End: 2018-08-09 | Stop reason: SDUPTHER

## 2018-02-09 RX ORDER — OLMESARTAN MEDOXOMIL 40 MG/1
40 TABLET ORAL DAILY
Qty: 90 TABLET | Refills: 1 | Status: SHIPPED | OUTPATIENT
Start: 2018-02-09 | End: 2018-05-21 | Stop reason: SDUPTHER

## 2018-02-09 RX ORDER — OMEPRAZOLE 20 MG/1
20 CAPSULE, DELAYED RELEASE ORAL
Qty: 90 CAPSULE | Refills: 1 | Status: SHIPPED | OUTPATIENT
Start: 2018-02-09 | End: 2018-08-28 | Stop reason: SDUPTHER

## 2018-02-09 RX ORDER — AMLODIPINE BESYLATE 5 MG/1
5 TABLET ORAL DAILY
Qty: 90 TABLET | Refills: 1 | Status: SHIPPED | OUTPATIENT
Start: 2018-02-09 | End: 2018-05-21 | Stop reason: SDUPTHER

## 2018-02-09 RX ORDER — ATORVASTATIN CALCIUM 40 MG/1
20 TABLET, FILM COATED ORAL DAILY
Qty: 90 TABLET | Refills: 0 | Status: SHIPPED | OUTPATIENT
Start: 2018-02-09 | End: 2018-07-03 | Stop reason: SDUPTHER

## 2018-02-09 RX ORDER — BACLOFEN 10 MG/1
10 TABLET ORAL 2 TIMES DAILY
COMMUNITY
Start: 2018-01-23 | End: 2018-02-23

## 2018-02-09 RX ORDER — CHLORTHALIDONE 25 MG/1
TABLET ORAL
Qty: 45 TABLET | Refills: 1 | Status: SHIPPED | OUTPATIENT
Start: 2018-02-09 | End: 2018-02-09 | Stop reason: SDUPTHER

## 2018-02-09 RX ORDER — AMITRIPTYLINE HYDROCHLORIDE 25 MG/1
25 TABLET, FILM COATED ORAL NIGHTLY
COMMUNITY
Start: 2018-01-23 | End: 2018-11-28 | Stop reason: SDUPTHER

## 2018-02-09 ASSESSMENT — ENCOUNTER SYMPTOMS: SHORTNESS OF BREATH: 0

## 2018-02-09 ASSESSMENT — PATIENT HEALTH QUESTIONNAIRE - PHQ9
2. FEELING DOWN, DEPRESSED OR HOPELESS: 0
1. LITTLE INTEREST OR PLEASURE IN DOING THINGS: 0
SUM OF ALL RESPONSES TO PHQ9 QUESTIONS 1 & 2: 0
SUM OF ALL RESPONSES TO PHQ QUESTIONS 1-9: 0

## 2018-02-09 NOTE — PROGRESS NOTES
Subjective:      Patient ID: Em Ferrara is a 61 y.o. male. Chief Complaint   Patient presents with    Hypertension    Hyperlipidemia    Edema     feet and legs swelling       Hypertension   This is a chronic problem. The current episode started more than 1 year ago. The problem is controlled. Associated symptoms include peripheral edema (lower legs). Pertinent negatives include no chest pain or shortness of breath. Risk factors for coronary artery disease include dyslipidemia, sedentary lifestyle, smoking/tobacco exposure, stress and male gender. Past treatments include angiotensin blockers, diuretics and calcium channel blockers. The current treatment provides significant improvement. There are no compliance problems. Hyperlipidemia   This is a chronic problem. The current episode started more than 1 year ago. The problem is controlled (per last lab). Factors aggravating his hyperlipidemia include fatty foods, smoking and thiazides. Pertinent negatives include no chest pain or shortness of breath. Current antihyperlipidemic treatment includes statins. The current treatment provides significant improvement of lipids. While here today pt has mentioned that his lower legs and ankles have some swelling that he describes as evident by sock print. Pt has been sitting more since he injured back and has had DVT. He does report pain from back and DVT are improving. Pt is a smoker who is not ready to stop at this time. Review of Systems   Constitutional: Negative for unexpected weight change. Respiratory: Negative for shortness of breath. Cardiovascular: Positive for leg swelling. Negative for chest pain. Gastrointestinal: Negative for constipation and diarrhea. Neurological: Negative for dizziness.      Allergies   Allergen Reactions    Penicillins Hives     Current Outpatient Prescriptions on File Prior to Visit   Medication Sig Dispense Refill    acetaminophen (TYLENOL 8 HOUR ARTHRITIS PAIN) 650 MG extended release tablet Take 1 tablet by mouth every 8 hours as needed for Pain 60 tablet 0    rivaroxaban (XARELTO) 15 MG TABS tablet Take 1 tablet by mouth 2 times daily (with meals) for 21 days 42 tablet 0     No current facility-administered medications on file prior to visit. Past Medical History:   Diagnosis Date    GERD (gastroesophageal reflux disease)     Hyperlipidemia     Hypertension          Objective:   Physical Exam   Constitutional: He is oriented to person, place, and time. He appears well-developed and well-nourished. No distress. HENT:   Head: Normocephalic and atraumatic. Right Ear: External ear normal.   Left Ear: External ear normal.   Nose: Nose normal.   Mouth/Throat: Oropharynx is clear and moist. No oropharyngeal exudate. Eyes: Conjunctivae and EOM are normal. Pupils are equal, round, and reactive to light. Neck: Normal range of motion. Neck supple. No tracheal deviation present. No thyromegaly present. Cardiovascular: Normal rate, regular rhythm and normal heart sounds. Pulmonary/Chest: Effort normal and breath sounds normal. No respiratory distress. Abdominal: Soft. Bowel sounds are normal. There is no tenderness. Musculoskeletal: He exhibits edema (trace edema noted to lower legs and ankles). Lymphadenopathy:     He has no cervical adenopathy. Neurological: He is alert and oriented to person, place, and time. Skin: Skin is warm and dry. He is not diaphoretic. Psychiatric: He has a normal mood and affect. His behavior is normal.   Nursing note and vitals reviewed. /80 (Site: Left Arm, Position: Sitting, Cuff Size: Large Adult)   Pulse 91   Temp 98 °F (36.7 °C) (Oral)   Resp 16   Wt 220 lb (99.8 kg)   SpO2 98%   BMI 35.51 kg/m²     Assessment:       1.  Essential hypertension  Urinalysis Reflex to Culture    CBC Auto Differential    Comprehensive Metabolic Panel    Lipid Panel    TSH without Reflex    T4, Free olmesartan (BENICAR) 40 MG tablet    amLODIPine (NORVASC) 5 MG tablet    chlorthalidone (HYGROTON) 25 MG tablet    DISCONTINUED: chlorthalidone (HYGROTON) 25 MG tablet   2. Mixed hyperlipidemia  Comprehensive Metabolic Panel    Lipid Panel    TSH without Reflex    atorvastatin (LIPITOR) 40 MG tablet   3. Screening for prostate cancer  Psa screening   4. Gastroesophageal reflux disease with esophagitis  omeprazole (PRILOSEC) 20 MG delayed release capsule   5. Acute deep vein thrombosis (DVT) of right lower extremity, unspecified vein (HCC)  rivaroxaban (XARELTO) 20 MG TABS tablet   6. Nicotine dependence, cigarettes, uncomplicated  MO VISIT TO DISCUSS LUNG CA SCREEN W LDCT    CT Lung Screening   7. Screening for HIV without presence of risk factors  HIV-1,-2 w/Reflex to HIV-1 Western Blot   8. Encounter for hepatitis C screening test for low risk patient  Cancel screening lab as  states blood drawn is not sufficient. Plan:    F/u in 2wks but pt to call at the very least in relation to med change  Plan as above  F/u in approx 3mo and prn pending lab and test review    Low Dose CT (LDCT) Lung Screening criteria met   Age 50-69   Pack year smoking >30   Still smoking or less than 15 year since quit   No sign or symptoms of lung cancer   > 11 months since last LDCT     Risks and benefits of lung cancer screening with LDCT scans discussed:    Significance of positive screen - False-positive LDCT results often occur. 95% of all positive results do not lead to a diagnosis of cancer. Usually further imaging can resolve most false-positive results; however, some patients may require invasive procedures. Over diagnosis risk - 10% to 12% of screen-detected lung cancer cases are over diagnosedthat is, the cancer would not have been detected in the patient's lifetime without the screening.     Need for follow up screens annually to continue lung cancer screening effectiveness     Risks associated with

## 2018-02-11 ASSESSMENT — ENCOUNTER SYMPTOMS
CONSTIPATION: 0
DIARRHEA: 0

## 2018-02-16 ENCOUNTER — HOSPITAL ENCOUNTER (OUTPATIENT)
Dept: GENERAL RADIOLOGY | Age: 58
Discharge: HOME | End: 2018-02-16
Payer: COMMERCIAL

## 2018-02-16 DIAGNOSIS — F17.210 NICOTINE DEPENDENCE, CIGARETTES, UNCOMPLICATED: ICD-10-CM

## 2018-02-16 PROCEDURE — G0297 LDCT FOR LUNG CA SCREEN: HCPCS

## 2018-05-21 ENCOUNTER — OFFICE VISIT (OUTPATIENT)
Dept: FAMILY MEDICINE CLINIC | Age: 58
End: 2018-05-21
Payer: COMMERCIAL

## 2018-05-21 VITALS
SYSTOLIC BLOOD PRESSURE: 132 MMHG | TEMPERATURE: 98.7 F | BODY MASS INDEX: 35.02 KG/M2 | WEIGHT: 217 LBS | OXYGEN SATURATION: 97 % | DIASTOLIC BLOOD PRESSURE: 94 MMHG | RESPIRATION RATE: 16 BRPM | HEART RATE: 109 BPM

## 2018-05-21 DIAGNOSIS — I10 ESSENTIAL HYPERTENSION: Primary | ICD-10-CM

## 2018-05-21 PROCEDURE — 99213 OFFICE O/P EST LOW 20 MIN: CPT | Performed by: NURSE PRACTITIONER

## 2018-05-21 RX ORDER — OLMESARTAN MEDOXOMIL 40 MG/1
40 TABLET ORAL DAILY
Qty: 90 TABLET | Refills: 1 | Status: SHIPPED | OUTPATIENT
Start: 2018-05-21 | End: 2018-10-22 | Stop reason: SDUPTHER

## 2018-05-21 RX ORDER — AMLODIPINE BESYLATE 5 MG/1
5 TABLET ORAL DAILY
Qty: 90 TABLET | Refills: 1 | Status: SHIPPED | OUTPATIENT
Start: 2018-05-21 | End: 2018-10-22 | Stop reason: SDUPTHER

## 2018-05-21 RX ORDER — CHLORTHALIDONE 25 MG/1
TABLET ORAL
Qty: 90 TABLET | Refills: 1 | Status: SHIPPED | OUTPATIENT
Start: 2018-05-21 | End: 2018-11-20 | Stop reason: SDUPTHER

## 2018-05-30 PROBLEM — G90.521 COMPLEX REGIONAL PAIN SYNDROME I OF RIGHT LOWER LIMB: Status: ACTIVE | Noted: 2018-05-30

## 2018-05-30 ASSESSMENT — ENCOUNTER SYMPTOMS
TROUBLE SWALLOWING: 0
DIARRHEA: 0
CONSTIPATION: 0
SHORTNESS OF BREATH: 0

## 2018-07-03 ENCOUNTER — OFFICE VISIT (OUTPATIENT)
Dept: FAMILY MEDICINE CLINIC | Age: 58
End: 2018-07-03
Payer: COMMERCIAL

## 2018-07-03 VITALS
HEART RATE: 88 BPM | RESPIRATION RATE: 16 BRPM | DIASTOLIC BLOOD PRESSURE: 72 MMHG | OXYGEN SATURATION: 97 % | TEMPERATURE: 96.9 F | HEIGHT: 66 IN | BODY MASS INDEX: 35.68 KG/M2 | WEIGHT: 222 LBS | SYSTOLIC BLOOD PRESSURE: 118 MMHG

## 2018-07-03 DIAGNOSIS — I10 ESSENTIAL HYPERTENSION: ICD-10-CM

## 2018-07-03 DIAGNOSIS — G90.521 COMPLEX REGIONAL PAIN SYNDROME I OF RIGHT LOWER LIMB: ICD-10-CM

## 2018-07-03 DIAGNOSIS — E78.2 MIXED HYPERLIPIDEMIA: ICD-10-CM

## 2018-07-03 DIAGNOSIS — M25.562 ACUTE PAIN OF LEFT KNEE: Primary | ICD-10-CM

## 2018-07-03 PROCEDURE — 99214 OFFICE O/P EST MOD 30 MIN: CPT | Performed by: FAMILY MEDICINE

## 2018-07-03 RX ORDER — ATORVASTATIN CALCIUM 40 MG/1
20 TABLET, FILM COATED ORAL DAILY
Qty: 90 TABLET | Refills: 0 | Status: SHIPPED | OUTPATIENT
Start: 2018-07-03 | End: 2018-11-28 | Stop reason: SDUPTHER

## 2018-07-03 RX ORDER — NAPROXEN 500 MG/1
500 TABLET ORAL 2 TIMES DAILY WITH MEALS
Qty: 60 TABLET | Refills: 0 | Status: SHIPPED | OUTPATIENT
Start: 2018-07-03 | End: 2018-07-29 | Stop reason: SDUPTHER

## 2018-07-03 ASSESSMENT — ENCOUNTER SYMPTOMS
SHORTNESS OF BREATH: 0
COUGH: 0
ABDOMINAL PAIN: 0
DIARRHEA: 0
RHINORRHEA: 0
EYE PAIN: 0
CONSTIPATION: 0

## 2018-07-03 NOTE — PROGRESS NOTES
Musculoskeletal: Positive for arthralgias, gait problem and myalgias. Neurological: Negative for weakness and numbness. Psychiatric/Behavioral: Negative for confusion and sleep disturbance. Past Medical History:   Diagnosis Date    Complex regional pain syndrome i of right lower limb 5/30/2018    GERD (gastroesophageal reflux disease)     Hyperlipidemia     Hypertension        Current Outpatient Prescriptions   Medication Sig Dispense Refill    atorvastatin (LIPITOR) 40 MG tablet Take 0.5 tablets by mouth daily TAKE 1 TABLET BY MOUTH AT BEDTIME 90 tablet 0    naproxen (NAPROSYN) 500 MG tablet Take 1 tablet by mouth 2 times daily (with meals) 60 tablet 0    olmesartan (BENICAR) 40 MG tablet Take 1 tablet by mouth daily 90 tablet 1    amLODIPine (NORVASC) 5 MG tablet Take 1 tablet by mouth daily 90 tablet 1    chlorthalidone (HYGROTON) 25 MG tablet TAKE ONE  TABLET BY MOUTH DAILY. 90 tablet 1    gabapentin (NEURONTIN) 300 MG capsule Take 900 mg by mouth 3 times daily.  amitriptyline (ELAVIL) 25 MG tablet Take 25 mg by mouth nightly      omeprazole (PRILOSEC) 20 MG delayed release capsule Take 1 capsule by mouth every morning (before breakfast) 90 capsule 1    acetaminophen (TYLENOL 8 HOUR ARTHRITIS PAIN) 650 MG extended release tablet Take 1 tablet by mouth every 8 hours as needed for Pain 60 tablet 0     No current facility-administered medications for this visit.         Allergies   Allergen Reactions    Penicillins Hives       Past Surgical History:   Procedure Laterality Date    FINGER SURGERY      OTHER SURGICAL HISTORY  08/2017    colonoscopy done at [facility] COLONOSCOPY W/BIOPSY SINGLE/MULTIPLE N/A 6/19/2017    Dr Juhi Matute x 4--5 yr recall       Social History   Substance Use Topics    Smoking status: Current Every Day Smoker     Packs/day: 1.00     Years: 45.00     Types: Cigarettes    Smokeless tobacco: Never Used    Alcohol use No       Family History   Problem voices understanding and agrees to plans along with risks and benefits of plan. Patient is instructed to continue prior meds, diet, and exercise plans as instructed. Patient agrees to follow up as instructed and sooner if needed. Patient agrees to go to ER if condition becomes emergent. Return if symptoms worsen or fail to improve, for next scheduled follow up with PCP.

## 2018-07-29 DIAGNOSIS — M25.562 ACUTE PAIN OF LEFT KNEE: ICD-10-CM

## 2018-07-31 RX ORDER — NAPROXEN 500 MG/1
TABLET ORAL
Qty: 60 TABLET | Refills: 0 | Status: SHIPPED | OUTPATIENT
Start: 2018-07-31 | End: 2018-08-26 | Stop reason: SDUPTHER

## 2018-08-08 ENCOUNTER — TELEPHONE (OUTPATIENT)
Dept: FAMILY MEDICINE CLINIC | Age: 58
End: 2018-08-08

## 2018-08-08 DIAGNOSIS — G90.521 COMPLEX REGIONAL PAIN SYNDROME I OF RIGHT LOWER LIMB: Primary | ICD-10-CM

## 2018-08-08 NOTE — TELEPHONE ENCOUNTER
Patient sent the following msg via [employer]:    From  Denver Watt To  [address] Staff Sent  8/8/2018  9:47 AM   Your office said they received my records from [address] [place] [address], at this time my meds are due to be filled gabapentin 300 mg.  Take 3 times a day if this could be filled @ [address] I would appreciate it when I get home from work I will be in to see you       Thank you

## 2018-08-09 RX ORDER — GABAPENTIN 300 MG/1
900 CAPSULE ORAL 3 TIMES DAILY
Qty: 270 CAPSULE | Refills: 2 | OUTPATIENT
Start: 2018-08-09 | End: 2018-10-02 | Stop reason: SDUPTHER

## 2018-08-26 DIAGNOSIS — M25.562 ACUTE PAIN OF LEFT KNEE: ICD-10-CM

## 2018-08-27 RX ORDER — NAPROXEN 500 MG/1
TABLET ORAL
Qty: 60 TABLET | Refills: 0 | Status: SHIPPED | OUTPATIENT
Start: 2018-08-27 | End: 2018-08-30 | Stop reason: SDUPTHER

## 2018-08-28 ENCOUNTER — OFFICE VISIT (OUTPATIENT)
Dept: FAMILY MEDICINE CLINIC | Age: 58
End: 2018-08-28
Payer: COMMERCIAL

## 2018-08-28 ENCOUNTER — HOSPITAL ENCOUNTER (OUTPATIENT)
Dept: GENERAL RADIOLOGY | Age: 58
Discharge: HOME | End: 2018-08-28
Payer: COMMERCIAL

## 2018-08-28 VITALS
WEIGHT: 225 LBS | HEART RATE: 86 BPM | SYSTOLIC BLOOD PRESSURE: 134 MMHG | OXYGEN SATURATION: 97 % | RESPIRATION RATE: 18 BRPM | TEMPERATURE: 97.7 F | HEIGHT: 66 IN | DIASTOLIC BLOOD PRESSURE: 82 MMHG | BODY MASS INDEX: 36.16 KG/M2

## 2018-08-28 DIAGNOSIS — M25.562 LEFT MEDIAL KNEE PAIN: ICD-10-CM

## 2018-08-28 DIAGNOSIS — E78.2 MIXED HYPERLIPIDEMIA: ICD-10-CM

## 2018-08-28 DIAGNOSIS — M25.562 LEFT MEDIAL KNEE PAIN: Primary | ICD-10-CM

## 2018-08-28 DIAGNOSIS — M17.12 PRIMARY OSTEOARTHRITIS OF LEFT KNEE: ICD-10-CM

## 2018-08-28 DIAGNOSIS — K21.00 GASTROESOPHAGEAL REFLUX DISEASE WITH ESOPHAGITIS: ICD-10-CM

## 2018-08-28 DIAGNOSIS — I10 ESSENTIAL HYPERTENSION: ICD-10-CM

## 2018-08-28 DIAGNOSIS — Z11.4 SCREENING FOR HIV WITHOUT PRESENCE OF RISK FACTORS: ICD-10-CM

## 2018-08-28 DIAGNOSIS — R80.9 PROTEINURIA, UNSPECIFIED TYPE: ICD-10-CM

## 2018-08-28 LAB
ALBUMIN SERPL-MCNC: 4.4 G/DL (ref 3.5–5.2)
ALP BLD-CCNC: 83 U/L (ref 40–130)
ALT SERPL-CCNC: 22 U/L (ref 5–41)
ANION GAP SERPL CALCULATED.3IONS-SCNC: 15 MMOL/L (ref 7–19)
AST SERPL-CCNC: 19 U/L (ref 5–40)
BASOPHILS ABSOLUTE: 0.1 K/UL (ref 0–0.2)
BASOPHILS RELATIVE PERCENT: 1.1 % (ref 0–1)
BILIRUB SERPL-MCNC: 0.4 MG/DL (ref 0.2–1.2)
BILIRUBIN URINE: NEGATIVE
BLOOD, URINE: NEGATIVE
BUN BLDV-MCNC: 16 MG/DL (ref 6–20)
CALCIUM SERPL-MCNC: 9.5 MG/DL (ref 8.6–10)
CHLORIDE BLD-SCNC: 103 MMOL/L (ref 98–111)
CHOLESTEROL, TOTAL: 134 MG/DL (ref 160–199)
CLARITY: CLEAR
CO2: 26 MMOL/L (ref 22–29)
COLOR: YELLOW
CREAT SERPL-MCNC: 0.7 MG/DL (ref 0.5–1.2)
EOSINOPHILS ABSOLUTE: 0.3 K/UL (ref 0–0.6)
EOSINOPHILS RELATIVE PERCENT: 3.9 % (ref 0–5)
GFR NON-AFRICAN AMERICAN: >60
GLUCOSE BLD-MCNC: 94 MG/DL (ref 74–109)
GLUCOSE URINE: NEGATIVE MG/DL
HCT VFR BLD CALC: 42.5 % (ref 42–52)
HDLC SERPL-MCNC: 34 MG/DL (ref 55–121)
HEMOGLOBIN: 14.6 G/DL (ref 14–18)
KETONES, URINE: NEGATIVE MG/DL
LDL CHOLESTEROL CALCULATED: 73 MG/DL
LEUKOCYTE ESTERASE, URINE: NEGATIVE
LYMPHOCYTES ABSOLUTE: 3 K/UL (ref 1.1–4.5)
LYMPHOCYTES RELATIVE PERCENT: 37.4 % (ref 20–40)
MCH RBC QN AUTO: 32.5 PG (ref 27–31)
MCHC RBC AUTO-ENTMCNC: 34.4 G/DL (ref 33–37)
MCV RBC AUTO: 94.7 FL (ref 80–94)
MONOCYTES ABSOLUTE: 0.6 K/UL (ref 0–0.9)
MONOCYTES RELATIVE PERCENT: 7.5 % (ref 0–10)
NEUTROPHILS ABSOLUTE: 4 K/UL (ref 1.5–7.5)
NEUTROPHILS RELATIVE PERCENT: 49.7 % (ref 50–65)
NITRITE, URINE: NEGATIVE
PDW BLD-RTO: 13 % (ref 11.5–14.5)
PH UA: 7
PLATELET # BLD: 202 K/UL (ref 130–400)
PMV BLD AUTO: 11 FL (ref 9.4–12.4)
POTASSIUM SERPL-SCNC: 3.8 MMOL/L (ref 3.5–5)
PROTEIN UA: NEGATIVE MG/DL
RBC # BLD: 4.49 M/UL (ref 4.7–6.1)
SODIUM BLD-SCNC: 144 MMOL/L (ref 136–145)
SPECIFIC GRAVITY UA: 1.02
TOTAL PROTEIN: 6.9 G/DL (ref 6.6–8.7)
TRIGL SERPL-MCNC: 134 MG/DL (ref 0–149)
URINE REFLEX TO CULTURE: NORMAL
UROBILINOGEN, URINE: 1 E.U./DL
WBC # BLD: 8.1 K/UL (ref 4.8–10.8)

## 2018-08-28 PROCEDURE — 73560 X-RAY EXAM OF KNEE 1 OR 2: CPT

## 2018-08-28 PROCEDURE — 73562 X-RAY EXAM OF KNEE 3: CPT

## 2018-08-28 PROCEDURE — 99214 OFFICE O/P EST MOD 30 MIN: CPT | Performed by: NURSE PRACTITIONER

## 2018-08-28 RX ORDER — OMEPRAZOLE 20 MG/1
20 CAPSULE, DELAYED RELEASE ORAL
Qty: 90 CAPSULE | Refills: 1 | Status: SHIPPED | OUTPATIENT
Start: 2018-08-28 | End: 2018-11-28 | Stop reason: SDUPTHER

## 2018-08-28 ASSESSMENT — ENCOUNTER SYMPTOMS
SHORTNESS OF BREATH: 0
BACK PAIN: 1

## 2018-08-28 NOTE — PROGRESS NOTES
SUBJECTIVE:    Patient ID: Ladonna Andrade is a 61 y.o. male. Ladonna Andrade is here today for 6 Month Follow-Up (Patient states that he is fasting for lab work today. Patient presents for medication follow up)  . HPI:   HPI     [place] [facility]   Pt is reporting needing refill on omeprazole. Patient states that his reflux is well controlled with this medication. Patient denies any abdominal pain. There is no reported constipation or diarrhea. Patient is complaining of left knee pain. Onset of symptoms has been months ago. Patient states that it seems to have worsened with the diagnosis of complex regional pain syndrome of the right leg. Patient is concerned that the way that he walks in relation to the CRPS has to do with the left knee pain worsening. Pain is to the medial area of left knee reportedly. No complaint of swelling or redness. Patient does use gabapentin 300 mg capsule 3 capsules 3 times a day for the C RPS pain. Patient is well controlled with this medication treatment plan and states that he does not need refills on this medication today. Patient is also stating that he is fasting today. Patient would like to have all needed blood work. Patient is continuing to smoke. Patient does have Chantix at home but has not started treatment plan. Patient does not feel his mind is made up to stop. Past Medical History:   Diagnosis Date    Complex regional pain syndrome i of right lower limb 5/30/2018    GERD (gastroesophageal reflux disease)     Hyperlipidemia     Hypertension      Prior to Visit Medications    Medication Sig Taking?  Authorizing Provider   Multiple Vitamins-Minerals (CENTRUM SILVER 50+MEN) TABS Take by mouth Yes Historical Provider, MD   omeprazole (PRILOSEC) 20 MG delayed release capsule Take 1 capsule by mouth every morning (before breakfast) Yes GIOVANNY Garza   diclofenac sodium (VOLTAREN) 1 % GEL Apply 4 g topically 4 times daily Yes GIOVANNY Garza   naproxen (NAPROSYN) 500 MG tablet TAKE 1 TABLET BY MOUTH TWICE A DAY WITH FOOD Yes GIOVANNY Garza   gabapentin (NEURONTIN) 300 MG capsule Take 3 capsules by mouth 3 times daily for 161 days. Yenifer Ramsey GIOVANNY Guadalupe   atorvastatin (LIPITOR) 40 MG tablet Take 0.5 tablets by mouth daily TAKE 1 TABLET BY MOUTH AT BEDTIME Yes Garima Franks MD   olmesartan (BENICAR) 40 MG tablet Take 1 tablet by mouth daily Yes GIOVANNY Garza   amLODIPine (NORVASC) 5 MG tablet Take 1 tablet by mouth daily Yes GIOVANNY Garza   chlorthalidone (HYGROTON) 25 MG tablet TAKE ONE  TABLET BY MOUTH DAILY. Yes GIOVANNY Garza   acetaminophen (TYLENOL 8 HOUR ARTHRITIS PAIN) 650 MG extended release tablet Take 1 tablet by mouth every 8 hours as needed for Pain Yes GIOVANNY Garza   amitriptyline (ELAVIL) 25 MG tablet Take 25 mg by mouth nightly  Historical Provider, MD     Allergies   Allergen Reactions    Penicillins Hives     Other reaction(s): Other (see comments)  Patient states he passed out the last time he took penicillin      Past Surgical History:   Procedure Laterality Date    FINGER SURGERY      OTHER SURGICAL HISTORY  08/2017    colonoscopy done at [facility] COLONOSCOPY W/BIOPSY SINGLE/MULTIPLE N/A 6/19/2017    Dr Arzola Dural x 4--5 yr recall     Family History   Problem Relation Age of Onset    High Blood Pressure Mother     Cancer Mother      Social History     Social History    Marital status:      Spouse name: N/A    Number of children: N/A    Years of education: N/A     Occupational History    Not on file. Social History Main Topics    Smoking status: Current Every Day Smoker     Packs/day: 1.00     Years: 45.00     Types: Cigarettes    Smokeless tobacco: Never Used    Alcohol use No    Drug use: No    Sexual activity: Yes     Partners: Female     Other Topics Concern    Not on file     Social History Narrative    No narrative on file       Review of Systems   Constitutional: Negative for unexpected weight change.

## 2018-08-30 DIAGNOSIS — M25.562 ACUTE PAIN OF LEFT KNEE: ICD-10-CM

## 2018-08-30 LAB — HIV-1 AND HIV-2 ANTIBODIES: NEGATIVE

## 2018-08-30 RX ORDER — NAPROXEN 500 MG/1
500 TABLET ORAL 2 TIMES DAILY WITH MEALS
Qty: 180 TABLET | Refills: 0 | Status: SHIPPED | OUTPATIENT
Start: 2018-08-30 | End: 2020-01-22

## 2018-10-01 ENCOUNTER — TELEPHONE (OUTPATIENT)
Dept: FAMILY MEDICINE CLINIC | Age: 58
End: 2018-10-01

## 2018-10-02 DIAGNOSIS — G90.521 COMPLEX REGIONAL PAIN SYNDROME I OF RIGHT LOWER LIMB: ICD-10-CM

## 2018-10-02 RX ORDER — GABAPENTIN 300 MG/1
900 CAPSULE ORAL 3 TIMES DAILY
Qty: 270 CAPSULE | Refills: 1 | OUTPATIENT
Start: 2018-10-02 | End: 2019-01-11 | Stop reason: SDUPTHER

## 2018-10-22 DIAGNOSIS — I10 ESSENTIAL HYPERTENSION: ICD-10-CM

## 2018-10-22 RX ORDER — OLMESARTAN MEDOXOMIL 40 MG/1
40 TABLET ORAL DAILY
Qty: 90 TABLET | Refills: 0 | Status: SHIPPED | OUTPATIENT
Start: 2018-10-22 | End: 2018-11-28 | Stop reason: SDUPTHER

## 2018-10-22 RX ORDER — AMLODIPINE BESYLATE 5 MG/1
5 TABLET ORAL DAILY
Qty: 90 TABLET | Refills: 0 | Status: SHIPPED | OUTPATIENT
Start: 2018-10-22 | End: 2018-11-28 | Stop reason: SDUPTHER

## 2018-11-20 DIAGNOSIS — I10 ESSENTIAL HYPERTENSION: ICD-10-CM

## 2018-11-20 RX ORDER — CHLORTHALIDONE 25 MG/1
TABLET ORAL
Qty: 90 TABLET | Refills: 0 | Status: SHIPPED | OUTPATIENT
Start: 2018-11-20 | End: 2018-11-28 | Stop reason: SDUPTHER

## 2018-11-28 ENCOUNTER — OFFICE VISIT (OUTPATIENT)
Dept: FAMILY MEDICINE CLINIC | Age: 58
End: 2018-11-28
Payer: COMMERCIAL

## 2018-11-28 VITALS
BODY MASS INDEX: 35.52 KG/M2 | HEIGHT: 66 IN | TEMPERATURE: 99.1 F | RESPIRATION RATE: 18 BRPM | SYSTOLIC BLOOD PRESSURE: 128 MMHG | DIASTOLIC BLOOD PRESSURE: 80 MMHG | OXYGEN SATURATION: 97 % | HEART RATE: 86 BPM | WEIGHT: 221 LBS

## 2018-11-28 DIAGNOSIS — I10 ESSENTIAL HYPERTENSION: ICD-10-CM

## 2018-11-28 DIAGNOSIS — K21.00 GASTROESOPHAGEAL REFLUX DISEASE WITH ESOPHAGITIS: ICD-10-CM

## 2018-11-28 DIAGNOSIS — E78.2 MIXED HYPERLIPIDEMIA: ICD-10-CM

## 2018-11-28 DIAGNOSIS — F17.200 SMOKER: Primary | ICD-10-CM

## 2018-11-28 PROCEDURE — 99214 OFFICE O/P EST MOD 30 MIN: CPT | Performed by: NURSE PRACTITIONER

## 2018-11-28 RX ORDER — AMITRIPTYLINE HYDROCHLORIDE 25 MG/1
25 TABLET, FILM COATED ORAL NIGHTLY
Qty: 90 TABLET | Refills: 1 | Status: SHIPPED | OUTPATIENT
Start: 2018-11-28 | End: 2019-02-14 | Stop reason: SDUPTHER

## 2018-11-28 RX ORDER — OLMESARTAN MEDOXOMIL 40 MG/1
40 TABLET ORAL DAILY
Qty: 90 TABLET | Refills: 1 | Status: SHIPPED | OUTPATIENT
Start: 2018-11-28 | End: 2019-02-14 | Stop reason: SDUPTHER

## 2018-11-28 RX ORDER — ATORVASTATIN CALCIUM 40 MG/1
20 TABLET, FILM COATED ORAL DAILY
Qty: 90 TABLET | Refills: 1 | Status: SHIPPED | OUTPATIENT
Start: 2018-11-28 | End: 2019-05-10 | Stop reason: SDUPTHER

## 2018-11-28 RX ORDER — AMLODIPINE BESYLATE 5 MG/1
5 TABLET ORAL DAILY
Qty: 90 TABLET | Refills: 1 | Status: SHIPPED | OUTPATIENT
Start: 2018-11-28 | End: 2019-02-14 | Stop reason: SDUPTHER

## 2018-11-28 RX ORDER — CHLORTHALIDONE 25 MG/1
TABLET ORAL
Qty: 90 TABLET | Refills: 1 | Status: SHIPPED | OUTPATIENT
Start: 2018-11-28 | End: 2019-02-14 | Stop reason: SDUPTHER

## 2018-11-28 RX ORDER — OMEPRAZOLE 20 MG/1
20 CAPSULE, DELAYED RELEASE ORAL
Qty: 90 CAPSULE | Refills: 1 | Status: SHIPPED | OUTPATIENT
Start: 2018-11-28 | End: 2019-02-14 | Stop reason: SDUPTHER

## 2018-11-28 ASSESSMENT — ENCOUNTER SYMPTOMS
TROUBLE SWALLOWING: 0
DIARRHEA: 0
CONSTIPATION: 0
COUGH: 0
SHORTNESS OF BREATH: 0

## 2018-11-28 NOTE — PROGRESS NOTES
amitriptyline (ELAVIL) 25 MG tablet Take 1 tablet by mouth nightly Yes Leonila Guadalupe APRN   nicotine (NICOTROL) 10 MG inhaler Inhale 1 puff into the lungs as needed for Smoking cessation Yes Leonila Guadalupe APRN   chlorthalidone (HYGROTON) 25 MG tablet TAKE ONE  TABLET BY MOUTH DAILY. Yes Leonilakiya Guadalupe APRN   amLODIPine (NORVASC) 5 MG tablet Take 1 tablet by mouth daily Yes Leonilakiya Guadalupe APRN   olmesartan (BENICAR) 40 MG tablet Take 1 tablet by mouth daily Yes Leonila Anayeli, APRN   gabapentin (NEURONTIN) 300 MG capsule Take 3 capsules by mouth 3 times daily for 161 days. Jostarda American GIOVANNY Guadalupe   naproxen (NAPROSYN) 500 MG tablet Take 1 tablet by mouth 2 times daily (with meals) Yes Leonilakiya Guadalupe APRN   Multiple Vitamins-Minerals (CENTRUM SILVER 50+MEN) TABS Take by mouth Yes Historical Provider, MD   acetaminophen (TYLENOL 8 HOUR ARTHRITIS PAIN) 650 MG extended release tablet Take 1 tablet by mouth every 8 hours as needed for Pain Yes Leonilakiya Guadalupe APRN   diclofenac sodium (VOLTAREN) 1 % GEL Apply 4 g topically 4 times daily  Leonila Anayeli APRN     Allergies   Allergen Reactions    Penicillins Hives     Other reaction(s): Other (see comments)  Patient states he passed out the last time he took penicillin      Past Surgical History:   Procedure Laterality Date    FINGER SURGERY      OTHER SURGICAL HISTORY  08/2017    colonoscopy done at [facility] COLONOSCOPY W/BIOPSY SINGLE/MULTIPLE N/A 6/19/2017    Dr Varun Grimes x 4--5 yr recall     Family History   Problem Relation Age of Onset    High Blood Pressure Mother     Cancer Mother      Social History     Social History    Marital status:      Spouse name: N/A    Number of children: N/A    Years of education: N/A     Occupational History    Not on file. Social History Main Topics    Smoking status: Former Smoker     Packs/day: 1.50     Years: 45.00     Types: Cigarettes     Quit date: 11/28/1973    Smokeless tobacco: Never Used    Alcohol use No    Drug use:  No hyperlipidemia E78.2 atorvastatin (LIPITOR) 40 MG tablet   4. Essential hypertension I10 chlorthalidone (HYGROTON) 25 MG tablet     amLODIPine (NORVASC) 5 MG tablet     olmesartan (BENICAR) 40 MG tablet       PLAN:    Mat Hernandez: Medication Refill (Patient presents for medication refill and fasting blood work) and Nicotine Dependence (Patient would like to discuss taking Nicotrol to quit smoking)  Pt to call when needs refill on Neurontin  Recheck Bp  Lab due in Feb  Shingrix info  Continue current med tx plan. Diagnosis and orders for this visit are above. Please note that this chart was generated using dragon dictationsoftware. Although every effort was made to ensure the accuracy of this automated transcription, some errors in transcription may have occurred.

## 2018-11-29 ENCOUNTER — TELEPHONE (OUTPATIENT)
Dept: FAMILY MEDICINE CLINIC | Age: 58
End: 2018-11-29

## 2018-12-12 DIAGNOSIS — E78.2 MIXED HYPERLIPIDEMIA: ICD-10-CM

## 2018-12-12 RX ORDER — ATORVASTATIN CALCIUM 40 MG/1
TABLET, FILM COATED ORAL
Qty: 90 TABLET | Refills: 0 | Status: SHIPPED | OUTPATIENT
Start: 2018-12-12 | End: 2019-02-14

## 2019-01-10 ENCOUNTER — TELEPHONE (OUTPATIENT)
Dept: FAMILY MEDICINE CLINIC | Age: 59
End: 2019-01-10

## 2019-01-11 DIAGNOSIS — G90.521 COMPLEX REGIONAL PAIN SYNDROME I OF RIGHT LOWER LIMB: ICD-10-CM

## 2019-01-11 RX ORDER — GABAPENTIN 300 MG/1
900 CAPSULE ORAL 3 TIMES DAILY
Qty: 270 CAPSULE | Refills: 0 | OUTPATIENT
Start: 2019-01-11 | End: 2019-02-14 | Stop reason: SDUPTHER

## 2019-02-14 ENCOUNTER — OFFICE VISIT (OUTPATIENT)
Dept: FAMILY MEDICINE CLINIC | Age: 59
End: 2019-02-14
Payer: COMMERCIAL

## 2019-02-14 VITALS
BODY MASS INDEX: 34.22 KG/M2 | TEMPERATURE: 98.4 F | DIASTOLIC BLOOD PRESSURE: 78 MMHG | WEIGHT: 212 LBS | RESPIRATION RATE: 18 BRPM | HEART RATE: 89 BPM | SYSTOLIC BLOOD PRESSURE: 110 MMHG | OXYGEN SATURATION: 98 %

## 2019-02-14 DIAGNOSIS — Z12.5 PROSTATE CANCER SCREENING: ICD-10-CM

## 2019-02-14 DIAGNOSIS — G90.521 COMPLEX REGIONAL PAIN SYNDROME I OF RIGHT LOWER LIMB: ICD-10-CM

## 2019-02-14 DIAGNOSIS — E78.2 MIXED HYPERLIPIDEMIA: ICD-10-CM

## 2019-02-14 DIAGNOSIS — I10 ESSENTIAL HYPERTENSION: ICD-10-CM

## 2019-02-14 DIAGNOSIS — I10 ESSENTIAL HYPERTENSION: Primary | ICD-10-CM

## 2019-02-14 DIAGNOSIS — K21.00 GASTROESOPHAGEAL REFLUX DISEASE WITH ESOPHAGITIS: ICD-10-CM

## 2019-02-14 LAB
ALBUMIN SERPL-MCNC: 4.3 G/DL (ref 3.5–5.2)
ALP BLD-CCNC: 123 U/L (ref 40–130)
ALT SERPL-CCNC: 11 U/L (ref 5–41)
AMPHETAMINE SCREEN, URINE: NEGATIVE
ANION GAP SERPL CALCULATED.3IONS-SCNC: 15 MMOL/L (ref 7–19)
AST SERPL-CCNC: 16 U/L (ref 5–40)
BACTERIA: NEGATIVE /HPF
BARBITURATE SCREEN, URINE: NEGATIVE
BASOPHILS ABSOLUTE: 0.1 K/UL (ref 0–0.2)
BASOPHILS RELATIVE PERCENT: 0.7 % (ref 0–1)
BENZODIAZEPINE SCREEN, URINE: NEGATIVE
BILIRUB SERPL-MCNC: 0.4 MG/DL (ref 0.2–1.2)
BILIRUBIN URINE: NEGATIVE
BLOOD, URINE: NEGATIVE
BUN BLDV-MCNC: 18 MG/DL (ref 6–20)
BUPRENORPHINE URINE: NORMAL
CALCIUM SERPL-MCNC: 9.9 MG/DL (ref 8.6–10)
CHLORIDE BLD-SCNC: 100 MMOL/L (ref 98–111)
CHOLESTEROL, TOTAL: 142 MG/DL (ref 160–199)
CLARITY: CLEAR
CO2: 27 MMOL/L (ref 22–29)
COCAINE METABOLITE SCREEN URINE: NEGATIVE
COLOR: ABNORMAL
CREAT SERPL-MCNC: 0.8 MG/DL (ref 0.5–1.2)
EOSINOPHILS ABSOLUTE: 0.4 K/UL (ref 0–0.6)
EOSINOPHILS RELATIVE PERCENT: 2.8 % (ref 0–5)
EPITHELIAL CELLS, UA: 1 /HPF (ref 0–5)
GABAPENTIN SCREEN, URINE: NORMAL
GFR NON-AFRICAN AMERICAN: >60
GLUCOSE BLD-MCNC: 110 MG/DL (ref 74–109)
GLUCOSE URINE: NEGATIVE MG/DL
HCT VFR BLD CALC: 45.4 % (ref 42–52)
HDLC SERPL-MCNC: 32 MG/DL (ref 55–121)
HEMOGLOBIN: 15.3 G/DL (ref 14–18)
HYALINE CASTS: 2 /HPF (ref 0–8)
KETONES, URINE: ABNORMAL MG/DL
LDL CHOLESTEROL CALCULATED: 90 MG/DL
LEUKOCYTE ESTERASE, URINE: ABNORMAL
LYMPHOCYTES ABSOLUTE: 3.9 K/UL (ref 1.1–4.5)
LYMPHOCYTES RELATIVE PERCENT: 30 % (ref 20–40)
MCH RBC QN AUTO: 32 PG (ref 27–31)
MCHC RBC AUTO-ENTMCNC: 33.7 G/DL (ref 33–37)
MCV RBC AUTO: 95 FL (ref 80–94)
MDMA URINE: NEGATIVE
METHADONE SCREEN, URINE: NEGATIVE
METHAMPHETAMINE, URINE: NEGATIVE
MONOCYTES ABSOLUTE: 0.9 K/UL (ref 0–0.9)
MONOCYTES RELATIVE PERCENT: 6.6 % (ref 0–10)
NEUTROPHILS ABSOLUTE: 7.6 K/UL (ref 1.5–7.5)
NEUTROPHILS RELATIVE PERCENT: 59.4 % (ref 50–65)
NITRITE, URINE: NEGATIVE
OPIATE SCREEN URINE: NEGATIVE
OXYCODONE SCREEN URINE: NEGATIVE
PDW BLD-RTO: 13.2 % (ref 11.5–14.5)
PH UA: 6.5
PHENCYCLIDINE SCREEN URINE: NEGATIVE
PLATELET # BLD: 209 K/UL (ref 130–400)
PMV BLD AUTO: 10.5 FL (ref 9.4–12.4)
POTASSIUM SERPL-SCNC: 3.6 MMOL/L (ref 3.5–5)
PROPOXYPHENE SCREEN, URINE: NORMAL
PROSTATE SPECIFIC ANTIGEN: 0.55 NG/ML (ref 0–4)
PROTEIN UA: 30 MG/DL
RBC # BLD: 4.78 M/UL (ref 4.7–6.1)
RBC UA: 2 /HPF (ref 0–4)
SODIUM BLD-SCNC: 142 MMOL/L (ref 136–145)
SPECIFIC GRAVITY UA: 1.04
THC SCREEN, URINE: NEGATIVE
TOTAL PROTEIN: 7.8 G/DL (ref 6.6–8.7)
TRICYCLIC ANTIDEPRESSANTS, UR: NEGATIVE
TRIGL SERPL-MCNC: 101 MG/DL (ref 0–149)
TSH SERPL DL<=0.05 MIU/L-ACNC: 1.14 UIU/ML (ref 0.27–4.2)
URINE REFLEX TO CULTURE: YES
UROBILINOGEN, URINE: 1 E.U./DL
WBC # BLD: 12.8 K/UL (ref 4.8–10.8)
WBC UA: 1 /HPF (ref 0–5)

## 2019-02-14 PROCEDURE — 99214 OFFICE O/P EST MOD 30 MIN: CPT | Performed by: NURSE PRACTITIONER

## 2019-02-14 PROCEDURE — 80305 DRUG TEST PRSMV DIR OPT OBS: CPT | Performed by: NURSE PRACTITIONER

## 2019-02-14 RX ORDER — OMEPRAZOLE 20 MG/1
20 CAPSULE, DELAYED RELEASE ORAL
Qty: 90 CAPSULE | Refills: 1 | Status: SHIPPED | OUTPATIENT
Start: 2019-02-14 | End: 2019-05-10 | Stop reason: SDUPTHER

## 2019-02-14 RX ORDER — ATORVASTATIN CALCIUM 40 MG/1
TABLET, FILM COATED ORAL
Qty: 90 TABLET | Refills: 0 | Status: SHIPPED | OUTPATIENT
Start: 2019-02-14 | End: 2020-12-16 | Stop reason: SDUPTHER

## 2019-02-14 RX ORDER — AMLODIPINE BESYLATE 5 MG/1
5 TABLET ORAL DAILY
Qty: 90 TABLET | Refills: 1 | Status: SHIPPED | OUTPATIENT
Start: 2019-02-14 | End: 2019-05-10 | Stop reason: SDUPTHER

## 2019-02-14 RX ORDER — GABAPENTIN 300 MG/1
900 CAPSULE ORAL 3 TIMES DAILY
Qty: 270 CAPSULE | Refills: 0 | Status: SHIPPED | OUTPATIENT
Start: 2019-02-14 | End: 2019-03-13 | Stop reason: SDUPTHER

## 2019-02-14 RX ORDER — CHLORTHALIDONE 25 MG/1
TABLET ORAL
Qty: 90 TABLET | Refills: 1 | Status: SHIPPED | OUTPATIENT
Start: 2019-02-14 | End: 2019-05-10 | Stop reason: SDUPTHER

## 2019-02-14 RX ORDER — OLMESARTAN MEDOXOMIL 40 MG/1
40 TABLET ORAL DAILY
Qty: 90 TABLET | Refills: 1 | Status: SHIPPED | OUTPATIENT
Start: 2019-02-14 | End: 2019-05-10 | Stop reason: SDUPTHER

## 2019-02-14 RX ORDER — AMITRIPTYLINE HYDROCHLORIDE 25 MG/1
25 TABLET, FILM COATED ORAL NIGHTLY
Qty: 90 TABLET | Refills: 1 | Status: SHIPPED | OUTPATIENT
Start: 2019-02-14 | End: 2019-05-10 | Stop reason: SDUPTHER

## 2019-02-14 ASSESSMENT — ENCOUNTER SYMPTOMS
CONSTIPATION: 0
SHORTNESS OF BREATH: 0
DIARRHEA: 0

## 2019-02-14 ASSESSMENT — PATIENT HEALTH QUESTIONNAIRE - PHQ9
SUM OF ALL RESPONSES TO PHQ QUESTIONS 1-9: 0
2. FEELING DOWN, DEPRESSED OR HOPELESS: 0
SUM OF ALL RESPONSES TO PHQ QUESTIONS 1-9: 0
SUM OF ALL RESPONSES TO PHQ9 QUESTIONS 1 & 2: 0
1. LITTLE INTEREST OR PLEASURE IN DOING THINGS: 0

## 2019-02-16 LAB
ORGANISM: ABNORMAL
URINE CULTURE, ROUTINE: ABNORMAL
URINE CULTURE, ROUTINE: ABNORMAL

## 2019-02-25 RX ORDER — CIPROFLOXACIN 250 MG/1
250 TABLET, FILM COATED ORAL 2 TIMES DAILY
Qty: 14 TABLET | Refills: 0 | Status: SHIPPED | OUTPATIENT
Start: 2019-02-25 | End: 2019-03-04

## 2019-03-13 DIAGNOSIS — G90.521 COMPLEX REGIONAL PAIN SYNDROME I OF RIGHT LOWER LIMB: ICD-10-CM

## 2019-03-15 RX ORDER — GABAPENTIN 300 MG/1
CAPSULE ORAL
Qty: 270 CAPSULE | Refills: 0 | OUTPATIENT
Start: 2019-03-15 | End: 2019-04-15 | Stop reason: SDUPTHER

## 2019-04-15 ENCOUNTER — TELEPHONE (OUTPATIENT)
Dept: FAMILY MEDICINE CLINIC | Age: 59
End: 2019-04-15

## 2019-04-15 DIAGNOSIS — G90.521 COMPLEX REGIONAL PAIN SYNDROME I OF RIGHT LOWER LIMB: ICD-10-CM

## 2019-04-15 RX ORDER — GABAPENTIN 300 MG/1
300 CAPSULE ORAL 3 TIMES DAILY
Qty: 270 CAPSULE | Refills: 0 | Status: SHIPPED | OUTPATIENT
Start: 2019-04-15 | End: 2019-05-10 | Stop reason: SDUPTHER

## 2019-04-15 NOTE — TELEPHONE ENCOUNTER
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

## 2019-05-10 ENCOUNTER — OFFICE VISIT (OUTPATIENT)
Dept: FAMILY MEDICINE CLINIC | Age: 59
End: 2019-05-10
Payer: COMMERCIAL

## 2019-05-10 VITALS
TEMPERATURE: 97.6 F | OXYGEN SATURATION: 98 % | HEART RATE: 86 BPM | DIASTOLIC BLOOD PRESSURE: 66 MMHG | WEIGHT: 210.25 LBS | RESPIRATION RATE: 20 BRPM | SYSTOLIC BLOOD PRESSURE: 116 MMHG | BODY MASS INDEX: 33.94 KG/M2

## 2019-05-10 DIAGNOSIS — E78.2 MIXED HYPERLIPIDEMIA: ICD-10-CM

## 2019-05-10 DIAGNOSIS — R80.9 PROTEINURIA, UNSPECIFIED TYPE: ICD-10-CM

## 2019-05-10 DIAGNOSIS — I10 ESSENTIAL HYPERTENSION: ICD-10-CM

## 2019-05-10 DIAGNOSIS — G90.521 COMPLEX REGIONAL PAIN SYNDROME I OF RIGHT LOWER LIMB: Primary | ICD-10-CM

## 2019-05-10 DIAGNOSIS — K21.00 GASTROESOPHAGEAL REFLUX DISEASE WITH ESOPHAGITIS: ICD-10-CM

## 2019-05-10 LAB
BACTERIA: NEGATIVE /HPF
BILIRUBIN URINE: NEGATIVE
BLOOD, URINE: NEGATIVE
CLARITY: CLEAR
COLOR: YELLOW
EPITHELIAL CELLS, UA: 0 /HPF (ref 0–5)
GLUCOSE URINE: NEGATIVE MG/DL
HYALINE CASTS: 0 /HPF (ref 0–8)
KETONES, URINE: NEGATIVE MG/DL
LEUKOCYTE ESTERASE, URINE: NEGATIVE
NITRITE, URINE: NEGATIVE
PH UA: 7 (ref 5–8)
PROTEIN UA: 30 MG/DL
RBC UA: 2 /HPF (ref 0–4)
SPECIFIC GRAVITY UA: 1.02 (ref 1–1.03)
URINE REFLEX TO CULTURE: ABNORMAL
URINE TYPE: ABNORMAL
UROBILINOGEN, URINE: 1 E.U./DL
WBC UA: 0 /HPF (ref 0–5)

## 2019-05-10 PROCEDURE — 99214 OFFICE O/P EST MOD 30 MIN: CPT | Performed by: NURSE PRACTITIONER

## 2019-05-10 RX ORDER — OLMESARTAN MEDOXOMIL 40 MG/1
40 TABLET ORAL DAILY
Qty: 90 TABLET | Refills: 1 | Status: SHIPPED | OUTPATIENT
Start: 2019-05-10 | End: 2020-01-22 | Stop reason: SDUPTHER

## 2019-05-10 RX ORDER — AMITRIPTYLINE HYDROCHLORIDE 25 MG/1
25 TABLET, FILM COATED ORAL NIGHTLY
Qty: 90 TABLET | Refills: 1 | Status: SHIPPED | OUTPATIENT
Start: 2019-05-10 | End: 2019-06-21 | Stop reason: SINTOL

## 2019-05-10 RX ORDER — ATORVASTATIN CALCIUM 40 MG/1
20 TABLET, FILM COATED ORAL DAILY
Qty: 90 TABLET | Refills: 1 | Status: SHIPPED | OUTPATIENT
Start: 2019-05-10 | End: 2019-12-02 | Stop reason: SDUPTHER

## 2019-05-10 RX ORDER — GABAPENTIN 300 MG/1
900 CAPSULE ORAL 3 TIMES DAILY
Qty: 270 CAPSULE | Refills: 2 | Status: SHIPPED | OUTPATIENT
Start: 2019-05-10 | End: 2019-08-02 | Stop reason: SDUPTHER

## 2019-05-10 RX ORDER — CHLORTHALIDONE 25 MG/1
TABLET ORAL
Qty: 90 TABLET | Refills: 1 | Status: SHIPPED | OUTPATIENT
Start: 2019-05-10 | End: 2020-01-22 | Stop reason: SDUPTHER

## 2019-05-10 RX ORDER — OMEPRAZOLE 20 MG/1
20 CAPSULE, DELAYED RELEASE ORAL
Qty: 90 CAPSULE | Refills: 1 | Status: SHIPPED | OUTPATIENT
Start: 2019-05-10 | End: 2019-05-29 | Stop reason: CLARIF

## 2019-05-10 RX ORDER — AMLODIPINE BESYLATE 5 MG/1
5 TABLET ORAL DAILY
Qty: 90 TABLET | Refills: 1 | Status: SHIPPED | OUTPATIENT
Start: 2019-05-10 | End: 2020-01-22 | Stop reason: SDUPTHER

## 2019-05-10 ASSESSMENT — ENCOUNTER SYMPTOMS
TROUBLE SWALLOWING: 0
SHORTNESS OF BREATH: 0

## 2019-05-10 NOTE — PROGRESS NOTES
SUBJECTIVE:    Patient ID: Magi Eric is a63 y.o. male. Magi Eric is here today for Medication Refill (Patient presents for medication refills and is fasting for blood work if needed) and Discuss Medications (Patient states that Gabapentin was wrong the last time it was filled. He states that he takes 3 tabs TID and was told he can take a fourth tablet if needed.)  . HPI:   Hypertension   The current episode started more than 1 year ago. The problem is controlled. Pertinent negatives include no chest pain, peripheral edema or shortness of breath. Risk factors for coronary artery disease include dyslipidemia, male gender, stress and smoking/tobacco exposure. The current treatment provides significant improvement. There are no compliance problems. Hyperlipidemia   This is a chronic problem. The current episode started more than 1 year ago. The problem is controlled. Recent lipid tests were reviewed and are normal. Factors aggravating his hyperlipidemia include smoking and fatty foods. Pertinent negatives include no chest pain or shortness of breath. Current antihyperlipidemic treatment includes statins. The current treatment provides significant improvement of lipids. Compliance problems include adherence to diet. Risk factors for coronary artery disease include dyslipidemia, male sex, hypertension and stress. Pt also has regional pain syndrome he was dx at [place]. He is now taking gabapentin 300mg 3 po tid for pain control and feels it is working well. He is requesting refill today. 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 Smoker-unmotivated to stop at this time. Past Medical History:   Diagnosis Date    Complex regional pain syndrome i of right lower limb 5/30/2018    GERD (gastroesophageal reflux disease)     Hyperlipidemia     Hypertension      Prior to Visit Medications    Medication Sig Taking?  Authorizing Provider   gabapentin (NEURONTIN) 300 MG capsule Take 3 capsules by mouth 3 times daily for 30 days. Yes GIOVANNY Garza   omeprazole (PRILOSEC) 20 MG delayed release capsule Take 1 capsule by mouth every morning (before breakfast) Yes GIOVANNY Garza   amitriptyline (ELAVIL) 25 MG tablet Take 1 tablet by mouth nightly Yes GIOVANNY Garza   chlorthalidone (HYGROTON) 25 MG tablet TAKE ONE  TABLET BY MOUTH DAILY. Yes GIOVANNY Garza   amLODIPine (NORVASC) 5 MG tablet Take 1 tablet by mouth daily Yes GIOVANNY Garza   olmesartan (BENICAR) 40 MG tablet Take 1 tablet by mouth daily Yes GIOVANNY Garza   atorvastatin (LIPITOR) 40 MG tablet Take 0.5 tablets by mouth daily Indications: patient reports that he is only taking 1/2 tablet TAKE 1 TABLET BY MOUTH AT BEDTIME Yes GIOVANNY Garza   atorvastatin (LIPITOR) 40 MG tablet TAKE 0.5 TABLET BY MOUTH EVERY DAY Yes GIOVANNY Garza   naproxen (NAPROSYN) 500 MG tablet Take 1 tablet by mouth 2 times daily (with meals) Yes GIOVANNY Garza   Multiple Vitamins-Minerals (CENTRUM SILVER 50+MEN) TABS Take by mouth Yes Historical Provider, MD   acetaminophen (TYLENOL 8 HOUR ARTHRITIS PAIN) 650 MG extended release tablet Take 1 tablet by mouth every 8 hours as needed for Pain Yes GIOVANNY Garza     Allergies   Allergen Reactions    Penicillins Hives     Other reaction(s):  Other (see comments)  Patient states he passed out the last time he took penicillin      Past Surgical History:   Procedure Laterality Date    FINGER SURGERY      OTHER SURGICAL HISTORY  08/2017    colonoscopy done at [facility] COLONOSCOPY W/BIOPSY SINGLE/MULTIPLE N/A 6/19/2017    Dr Phu Sanchez x 4--5 yr recall     Family History   Problem Relation Age of Onset    High Blood Pressure Mother     Cancer Mother      Social History     Socioeconomic History    Marital status:      Spouse name: Not on file    Number of children: Not on file    Years of education: Not on file    Highest education level: Not on file   Occupational History    Not on file   Social Needs    Financial resource strain: Not on file    Food insecurity:     Worry: Not on file     Inability: Not on file    Transportation needs:     Medical: Not on file     Non-medical: Not on file   Tobacco Use    Smoking status: Former Smoker     Packs/day: 1.50     Years: 45.00     Pack years: 67.50     Types: Cigarettes     Last attempt to quit: 1973     Years since quittin.4    Smokeless tobacco: Never Used   Substance and Sexual Activity    Alcohol use: No    Drug use: No    Sexual activity: Yes     Partners: Female   Lifestyle    Physical activity:     Days per week: Not on file     Minutes per session: Not on file    Stress: Not on file   Relationships    Social connections:     Talks on phone: Not on file     Gets together: Not on file     Attends Christianity service: Not on file     Active member of club or organization: Not on file     Attends meetings of clubs or organizations: Not on file     Relationship status: Not on file    Intimate partner violence:     Fear of current or ex partner: Not on file     Emotionally abused: Not on file     Physically abused: Not on file     Forced sexual activity: Not on file   Other Topics Concern    Not on file   Social History Narrative    Not on file       Review of Systems   Constitutional: Negative for unexpected weight change. HENT: Negative for trouble swallowing. Respiratory: Negative for shortness of breath. Cardiovascular: Negative for chest pain. Musculoskeletal:        Leg pain       OBJECTIVE:    Physical Exam   Constitutional: He is oriented to person, place, and time. He appears well-developed and well-nourished. HENT:   Head: Normocephalic and atraumatic. Right Ear: External ear normal.   Left Ear: External ear normal.   Eyes: Pupils are equal, round, and reactive to light. Conjunctivae and EOM are normal.   Neck: Neck supple. No tracheal deviation present. No thyromegaly present. Cardiovascular: Normal rate, regular rhythm and normal heart sounds. Pulmonary/Chest: Effort normal and breath sounds normal. No stridor. No respiratory distress. Musculoskeletal: He exhibits no edema (no swelling of ble). Neurological: He is alert and oriented to person, place, and time. Skin: Skin is warm and dry. Capillary refill takes less than 2 seconds. Psychiatric: He has a normal mood and affect. His speech is normal and behavior is normal. Judgment and thought content normal. His mood appears not anxious. His affect is not angry. Cognition and memory are normal. He does not exhibit a depressed mood. Nursing note and vitals reviewed. /66 (Site: Left Upper Arm, Position: Sitting, Cuff Size: Large Adult)   Pulse 86   Temp 97.6 °F (36.4 °C) (Temporal)   Resp 20   Wt 210 lb 4 oz (95.4 kg)   SpO2 98%   BMI 33.94 kg/m²      ASSESSMENT:      ICD-10-CM    1. Complex regional pain syndrome i of right lower limb G90.521 gabapentin (NEURONTIN) 300 MG capsule   2. Mixed hyperlipidemia E78.2 atorvastatin (LIPITOR) 40 MG tablet   3. Gastroesophageal reflux disease with esophagitis K21.0 omeprazole (PRILOSEC) 20 MG delayed release capsule   4. Essential hypertension I10 chlorthalidone (HYGROTON) 25 MG tablet     amLODIPine (NORVASC) 5 MG tablet     olmesartan (BENICAR) 40 MG tablet   5. Proteinuria, unspecified type R80.9 Urinalysis Reflex to Culture       PLAN:    Ketan Cruz: Medication Refill (Patient presents for medication refills and is fasting for blood work if needed) and Discuss Medications (Patient states that Gabapentin was wrong the last time it was filled. He states that he takes 3 tabs TID and was told he can take a fourth tablet if needed.)  shingrix info sheet  Fasting lab is up to date  UA repeat today  [facility] agreement and contract on file. Diagnosis and orders for this visit are above. Please note that this chart was generated using dragon dictationsoftware.

## 2019-05-18 ENCOUNTER — TELEPHONE (OUTPATIENT)
Dept: FAMILY MEDICINE CLINIC | Age: 59
End: 2019-05-18

## 2019-05-18 NOTE — TELEPHONE ENCOUNTER
Prior authorization request for Omeprazole 20 mg has been denied. Request was denied because patient's insurance plan does not cover otc medications. Tried to call patient r/t denial.  No answer on cell phone. Kept getting the message \"the person you are trying to reach is not available. Please try your call again later. \". Letter scanned to chart.

## 2019-05-24 DIAGNOSIS — K21.00 GASTROESOPHAGEAL REFLUX DISEASE WITH ESOPHAGITIS: ICD-10-CM

## 2019-05-24 NOTE — TELEPHONE ENCOUNTER
Patient's insurance will not cover omeprazole because it is available OTC. Did not know if you wanted to change it or if you wanted him to buy OTC.   (Letter has been sent to patient notifying him.)

## 2019-05-29 RX ORDER — PANTOPRAZOLE SODIUM 40 MG/1
40 TABLET, DELAYED RELEASE ORAL
Qty: 90 TABLET | Refills: 0 | Status: SHIPPED | OUTPATIENT
Start: 2019-05-29 | End: 2020-01-22

## 2019-05-29 NOTE — TELEPHONE ENCOUNTER
I can change to Protonix but if pt has purchased OTC omeprazole, he can continue that instead. Sending now.

## 2019-05-30 NOTE — TELEPHONE ENCOUNTER
Attempted to reach patient at phone number on file, no answer. Left message for patient to call back regarding provider notes.

## 2019-06-21 ENCOUNTER — OFFICE VISIT (OUTPATIENT)
Dept: FAMILY MEDICINE CLINIC | Age: 59
End: 2019-06-21
Payer: COMMERCIAL

## 2019-06-21 VITALS
WEIGHT: 215.38 LBS | DIASTOLIC BLOOD PRESSURE: 62 MMHG | SYSTOLIC BLOOD PRESSURE: 128 MMHG | RESPIRATION RATE: 20 BRPM | HEART RATE: 91 BPM | OXYGEN SATURATION: 98 % | TEMPERATURE: 97.5 F | BODY MASS INDEX: 34.76 KG/M2

## 2019-06-21 DIAGNOSIS — R80.9 PROTEINURIA, UNSPECIFIED TYPE: ICD-10-CM

## 2019-06-21 DIAGNOSIS — F17.200 SMOKER: ICD-10-CM

## 2019-06-21 DIAGNOSIS — Z71.6 ENCOUNTER FOR SMOKING CESSATION COUNSELING: Primary | ICD-10-CM

## 2019-06-21 LAB
24HR URINE VOLUME (ML): 2050 ML
CREAT SERPL-MCNC: 0.8 MG/DL
CREATININE 24 HOUR URINE: 1.4 G/24HR (ref 1–2)
CREATININE CLEARANCE: 101 ML/MIN (ref 71–151)
Lab: 24 HOURS
Lab: 24 HR
PROTEIN 24 HOUR URINE: 123 MG/24HR (ref 50–100)

## 2019-06-21 PROCEDURE — 99213 OFFICE O/P EST LOW 20 MIN: CPT | Performed by: NURSE PRACTITIONER

## 2019-06-21 RX ORDER — BUPROPION HYDROCHLORIDE 150 MG/1
150 TABLET ORAL EVERY MORNING
Qty: 90 TABLET | Refills: 1 | Status: SHIPPED | OUTPATIENT
Start: 2019-06-21 | End: 2020-01-22 | Stop reason: ALTCHOICE

## 2019-06-21 RX ORDER — HYDROCODONE BITARTRATE AND ACETAMINOPHEN 5; 325 MG/1; MG/1
TABLET ORAL
Refills: 0 | COMMUNITY
Start: 2019-05-06 | End: 2020-01-22

## 2019-06-21 RX ORDER — BLOOD PRESSURE TEST KIT
KIT MISCELLANEOUS
Qty: 1 KIT | Refills: 0 | Status: SHIPPED | OUTPATIENT
Start: 2019-06-21

## 2019-06-21 ASSESSMENT — ENCOUNTER SYMPTOMS
SHORTNESS OF BREATH: 0
COUGH: 0

## 2019-06-21 NOTE — PROGRESS NOTES
SUBJECTIVE:    Patient ID: Rick Ballesteros is a63 y.o. male. Rick Ballesteros is here today for Nicotine Dependence (Patient presents wanting to quit smoking and wants to take wellbutrin)  . HPI:   HPI     Pt is here today requesting help to stop smoking. Pt states that he has had script for Chantix but \"scared of it\". Pt states that years ago he has used wellbutrin and used patches also. Pt did well with this tx and wants to do this again. Pt is currently smoking 1ppd. Pt states that he did turn in 24hr urine study today. This was ordered in relation to proteinuria. Pt is concerned that all of his soda intake may be playing a role. Pt states that he has made changes with intake, increasing water and decreasing soda. Past Medical History:   Diagnosis Date    Complex regional pain syndrome i of right lower limb 5/30/2018    GERD (gastroesophageal reflux disease)     Hyperlipidemia     Hypertension      Prior to Visit Medications    Medication Sig Taking? Authorizing Provider   buPROPion (WELLBUTRIN XL) 150 MG extended release tablet Take 1 tablet by mouth every morning Yes GIOVANNY Garza   Blood Pressure KIT Check bp 3x/wk   Dx-htn Yes GIOVANNY Garza   pantoprazole (PROTONIX) 40 MG tablet Take 1 tablet by mouth every morning (before breakfast) For reflux symptoms. Yes GIOVANNY Garza   chlorthalidone (HYGROTON) 25 MG tablet TAKE ONE  TABLET BY MOUTH DAILY.  Yes GIOVANNY Garza   amLODIPine (NORVASC) 5 MG tablet Take 1 tablet by mouth daily Yes GIOVANNY Garza   olmesartan (BENICAR) 40 MG tablet Take 1 tablet by mouth daily Yes GIOVANNY Garza   atorvastatin (LIPITOR) 40 MG tablet Take 0.5 tablets by mouth daily Indications: patient reports that he is only taking 1/2 tablet TAKE 1 TABLET BY MOUTH AT BEDTIME Yes GIOVANNY Garza   atorvastatin (LIPITOR) 40 MG tablet TAKE 0.5 TABLET BY MOUTH EVERY DAY Yes GIOVANNY Garza   naproxen (NAPROSYN) 500 MG tablet Take 1 tablet by mouth 2 times daily (with meals) Yes GIOVANNY Garza   Multiple Vitamins-Minerals (CENTRUM SILVER 50+MEN) TABS Take by mouth Yes Historical Provider, MD   acetaminophen (TYLENOL 8 HOUR ARTHRITIS PAIN) 650 MG extended release tablet Take 1 tablet by mouth every 8 hours as needed for Pain Yes GIOVANNY Garza   HYDROcodone-acetaminophen (NORCO) 5-325 MG per tablet TAKE 1 TO 2 TABLETS BY MOUTH EVERY 6 HOURS AS NEEDED FOR PAIN  Historical Provider, MD   gabapentin (NEURONTIN) 300 MG capsule Take 3 capsules by mouth 3 times daily for 30 days. GIOVANNY Rodriges     Allergies   Allergen Reactions    Penicillins Hives     Other reaction(s):  Other (see comments)  Patient states he passed out the last time he took penicillin      Past Surgical History:   Procedure Laterality Date    FINGER SURGERY      OTHER SURGICAL HISTORY  2017    colonoscopy done at [facility] COLONOSCOPY W/BIOPSY SINGLE/MULTIPLE N/A 2017    Dr Daniel Walden x 4--5 yr recall     Family History   Problem Relation Age of Onset    High Blood Pressure Mother     Cancer Mother      Social History     Socioeconomic History    Marital status:      Spouse name: Not on file    Number of children: Not on file    Years of education: Not on file    Highest education level: Not on file   Occupational History    Not on file   Social Needs    Financial resource strain: Not on file    Food insecurity:     Worry: Not on file     Inability: Not on file    Transportation needs:     Medical: Not on file     Non-medical: Not on file   Tobacco Use    Smoking status: Former Smoker     Packs/day: 1.50     Years: 45.00     Pack years: 67.50     Types: Cigarettes     Last attempt to quit: 1973     Years since quittin.5    Smokeless tobacco: Never Used   Substance and Sexual Activity    Alcohol use: No    Drug use: No    Sexual activity: Yes     Partners: Female   Lifestyle    Physical activity:     Days per week: Not on file     Minutes per session: Not on file    Stress: Not on file   Relationships    Social connections:     Talks on phone: Not on file     Gets together: Not on file     Attends Alevism service: Not on file     Active member of club or organization: Not on file     Attends meetings of clubs or organizations: Not on file     Relationship status: Not on file    Intimate partner violence:     Fear of current or ex partner: Not on file     Emotionally abused: Not on file     Physically abused: Not on file     Forced sexual activity: Not on file   Other Topics Concern    Not on file   Social History Narrative    Not on file       Review of Systems   Constitutional: Negative for unexpected weight change. Respiratory: Negative for cough and shortness of breath. OBJECTIVE:    Physical Exam   Constitutional: He is oriented to person, place, and time. He appears well-developed and well-nourished. HENT:   Head: Normocephalic and atraumatic. Eyes: Pupils are equal, round, and reactive to light. Conjunctivae and EOM are normal.   Neck: Neck supple. No tracheal deviation present. Cardiovascular: Normal rate, regular rhythm and normal heart sounds. Pulmonary/Chest: Effort normal and breath sounds normal. No stridor. No respiratory distress. Neurological: He is alert and oriented to person, place, and time. Skin: Skin is warm and dry. Psychiatric: He has a normal mood and affect. His speech is normal and behavior is normal. Judgment and thought content normal. His mood appears not anxious. His affect is not angry. Cognition and memory are normal. He does not exhibit a depressed mood. Nursing note and vitals reviewed. /62 (Site: Left Upper Arm, Position: Sitting, Cuff Size: Large Adult)   Pulse 91   Temp 97.5 °F (36.4 °C) (Temporal)   Resp 20   Wt 215 lb 6 oz (97.7 kg)   SpO2 98%   BMI 34.76 kg/m²      ASSESSMENT:      ICD-10-CM    1.  Encounter for smoking cessation counseling Z71.6 buPROPion [facility] XL) 150 MG extended release tablet   2. Smoker F17.200 buPROPion (WELLBUTRIN XL) 150 MG extended release tablet       PLAN:    Yadi Muller: Nicotine Dependence (Patient presents wanting to quit smoking and wants to take wellbutrin)  Side effects of new treatment discussed. Pt will f/u asap if any increase in negativity. Never stop medication without consulting healthcare provider. F/u in approx 3mo    Diagnosis and orders for this visit are above. Please note that this chart was generated using dragon dictationsoftware. Although every effort was made to ensure the accuracy of this automated transcription, some errors in transcription may have occurred.

## 2019-06-26 ENCOUNTER — TELEPHONE (OUTPATIENT)
Dept: FAMILY MEDICINE CLINIC | Age: 59
End: 2019-06-26

## 2019-08-02 DIAGNOSIS — G90.521 COMPLEX REGIONAL PAIN SYNDROME I OF RIGHT LOWER LIMB: ICD-10-CM

## 2019-08-02 RX ORDER — GABAPENTIN 300 MG/1
900 CAPSULE ORAL 3 TIMES DAILY
Qty: 270 CAPSULE | Refills: 2 | OUTPATIENT
Start: 2019-08-02 | End: 2020-01-22 | Stop reason: SDUPTHER

## 2019-08-02 RX ORDER — GABAPENTIN 300 MG/1
900 CAPSULE ORAL 3 TIMES DAILY
Qty: 810 CAPSULE | Refills: 0 | Status: CANCELLED | OUTPATIENT
Start: 2019-08-02 | End: 2019-09-01

## 2019-12-02 DIAGNOSIS — E78.2 MIXED HYPERLIPIDEMIA: ICD-10-CM

## 2019-12-02 RX ORDER — ATORVASTATIN CALCIUM 40 MG/1
20 TABLET, FILM COATED ORAL DAILY
Qty: 90 TABLET | Refills: 1 | Status: SHIPPED | OUTPATIENT
Start: 2019-12-02 | End: 2020-01-22 | Stop reason: SDUPTHER

## 2020-01-22 ENCOUNTER — OFFICE VISIT (OUTPATIENT)
Dept: FAMILY MEDICINE CLINIC | Age: 60
End: 2020-01-22
Payer: COMMERCIAL

## 2020-01-22 VITALS
HEIGHT: 66 IN | WEIGHT: 215 LBS | TEMPERATURE: 97.6 F | SYSTOLIC BLOOD PRESSURE: 122 MMHG | DIASTOLIC BLOOD PRESSURE: 68 MMHG | OXYGEN SATURATION: 98 % | HEART RATE: 80 BPM | RESPIRATION RATE: 20 BRPM | BODY MASS INDEX: 34.55 KG/M2

## 2020-01-22 DIAGNOSIS — E78.2 MIXED HYPERLIPIDEMIA: ICD-10-CM

## 2020-01-22 DIAGNOSIS — G90.521 COMPLEX REGIONAL PAIN SYNDROME I OF RIGHT LOWER LIMB: ICD-10-CM

## 2020-01-22 DIAGNOSIS — R73.01 IFG (IMPAIRED FASTING GLUCOSE): ICD-10-CM

## 2020-01-22 DIAGNOSIS — Z11.59 ENCOUNTER FOR HCV SCREENING TEST FOR LOW RISK PATIENT: ICD-10-CM

## 2020-01-22 DIAGNOSIS — Z12.5 PROSTATE CANCER SCREENING: ICD-10-CM

## 2020-01-22 DIAGNOSIS — I10 ESSENTIAL HYPERTENSION: ICD-10-CM

## 2020-01-22 LAB
ALBUMIN SERPL-MCNC: 4.8 G/DL (ref 3.5–5.2)
ALP BLD-CCNC: 81 U/L (ref 40–130)
ALT SERPL-CCNC: 24 U/L (ref 5–41)
AMPHETAMINE SCREEN, URINE: NEGATIVE
ANION GAP SERPL CALCULATED.3IONS-SCNC: 18 MMOL/L (ref 7–19)
AST SERPL-CCNC: 20 U/L (ref 5–40)
BARBITURATE SCREEN, URINE: NEGATIVE
BASOPHILS ABSOLUTE: 0.1 K/UL (ref 0–0.2)
BASOPHILS RELATIVE PERCENT: 1.1 % (ref 0–1)
BENZODIAZEPINE SCREEN, URINE: NEGATIVE
BILIRUB SERPL-MCNC: 0.5 MG/DL (ref 0.2–1.2)
BILIRUBIN URINE: NEGATIVE
BLOOD, URINE: NEGATIVE
BUN BLDV-MCNC: 18 MG/DL (ref 8–23)
BUPRENORPHINE URINE: NORMAL
CALCIUM SERPL-MCNC: 10.5 MG/DL (ref 8.8–10.2)
CHLORIDE BLD-SCNC: 102 MMOL/L (ref 98–111)
CHOLESTEROL, TOTAL: 151 MG/DL (ref 160–199)
CLARITY: CLEAR
CO2: 24 MMOL/L (ref 22–29)
COCAINE METABOLITE SCREEN URINE: NEGATIVE
COLOR: YELLOW
CREAT SERPL-MCNC: 0.8 MG/DL (ref 0.5–1.2)
EOSINOPHILS ABSOLUTE: 0.3 K/UL (ref 0–0.6)
EOSINOPHILS RELATIVE PERCENT: 2.8 % (ref 0–5)
GABAPENTIN SCREEN, URINE: NORMAL
GFR NON-AFRICAN AMERICAN: >60
GLUCOSE BLD-MCNC: 115 MG/DL (ref 74–109)
GLUCOSE URINE: NEGATIVE MG/DL
HBA1C MFR BLD: 6.1 % (ref 4–6)
HCT VFR BLD CALC: 47.2 % (ref 42–52)
HDLC SERPL-MCNC: 40 MG/DL (ref 55–121)
HEMOGLOBIN: 15.9 G/DL (ref 14–18)
HEPATITIS C ANTIBODY INTERPRETATION: NORMAL
IMMATURE GRANULOCYTES #: 0 K/UL
KETONES, URINE: NEGATIVE MG/DL
LDL CHOLESTEROL CALCULATED: 87 MG/DL
LEUKOCYTE ESTERASE, URINE: NEGATIVE
LYMPHOCYTES ABSOLUTE: 3.4 K/UL (ref 1.1–4.5)
LYMPHOCYTES RELATIVE PERCENT: 33.6 % (ref 20–40)
MCH RBC QN AUTO: 33.1 PG (ref 27–31)
MCHC RBC AUTO-ENTMCNC: 33.7 G/DL (ref 33–37)
MCV RBC AUTO: 98.3 FL (ref 80–94)
MDMA URINE: NEGATIVE
METHADONE SCREEN, URINE: NEGATIVE
METHAMPHETAMINE, URINE: NEGATIVE
MONOCYTES ABSOLUTE: 0.7 K/UL (ref 0–0.9)
MONOCYTES RELATIVE PERCENT: 6.9 % (ref 0–10)
NEUTROPHILS ABSOLUTE: 5.6 K/UL (ref 1.5–7.5)
NEUTROPHILS RELATIVE PERCENT: 55.4 % (ref 50–65)
NITRITE, URINE: NEGATIVE
OPIATE SCREEN URINE: NEGATIVE
OXYCODONE SCREEN URINE: NEGATIVE
PDW BLD-RTO: 13.2 % (ref 11.5–14.5)
PH UA: 7.5 (ref 5–8)
PHENCYCLIDINE SCREEN URINE: NEGATIVE
PLATELET # BLD: 207 K/UL (ref 130–400)
PMV BLD AUTO: 10.7 FL (ref 9.4–12.4)
POTASSIUM REFLEX MAGNESIUM: 3.7 MMOL/L (ref 3.5–5)
PROPOXYPHENE SCREEN, URINE: NORMAL
PROSTATE SPECIFIC ANTIGEN: 0.69 NG/ML (ref 0–4)
PROTEIN UA: ABNORMAL MG/DL
RBC # BLD: 4.8 M/UL (ref 4.7–6.1)
SODIUM BLD-SCNC: 144 MMOL/L (ref 136–145)
SPECIFIC GRAVITY UA: 1.03 (ref 1–1.03)
THC SCREEN, URINE: NEGATIVE
TOTAL PROTEIN: 7.5 G/DL (ref 6.6–8.7)
TRICYCLIC ANTIDEPRESSANTS, UR: NEGATIVE
TRIGL SERPL-MCNC: 119 MG/DL (ref 0–149)
TSH SERPL DL<=0.05 MIU/L-ACNC: 0.73 UIU/ML (ref 0.27–4.2)
URINE REFLEX TO CULTURE: ABNORMAL
UROBILINOGEN, URINE: 1 E.U./DL
VITAMIN D 25-HYDROXY: 32.5 NG/ML
WBC # BLD: 10.1 K/UL (ref 4.8–10.8)

## 2020-01-22 PROCEDURE — 80305 DRUG TEST PRSMV DIR OPT OBS: CPT | Performed by: NURSE PRACTITIONER

## 2020-01-22 PROCEDURE — 99214 OFFICE O/P EST MOD 30 MIN: CPT | Performed by: NURSE PRACTITIONER

## 2020-01-22 RX ORDER — OLMESARTAN MEDOXOMIL 40 MG/1
40 TABLET ORAL DAILY
Qty: 90 TABLET | Refills: 3 | Status: SHIPPED | OUTPATIENT
Start: 2020-01-22 | End: 2021-01-27 | Stop reason: SDUPTHER

## 2020-01-22 RX ORDER — ATORVASTATIN CALCIUM 40 MG/1
20 TABLET, FILM COATED ORAL DAILY
Qty: 90 TABLET | Refills: 1 | Status: SHIPPED | OUTPATIENT
Start: 2020-01-22 | End: 2020-05-27 | Stop reason: SDUPTHER

## 2020-01-22 RX ORDER — CHLORTHALIDONE 25 MG/1
TABLET ORAL
Qty: 90 TABLET | Refills: 3 | Status: SHIPPED | OUTPATIENT
Start: 2020-01-22 | End: 2021-01-27 | Stop reason: SDUPTHER

## 2020-01-22 RX ORDER — GABAPENTIN 300 MG/1
900 CAPSULE ORAL 3 TIMES DAILY
Qty: 270 CAPSULE | Refills: 2 | Status: SHIPPED | OUTPATIENT
Start: 2020-01-22 | End: 2020-02-26 | Stop reason: SDUPTHER

## 2020-01-22 RX ORDER — AMLODIPINE BESYLATE 5 MG/1
5 TABLET ORAL DAILY
Qty: 90 TABLET | Refills: 3 | Status: SHIPPED | OUTPATIENT
Start: 2020-01-22 | End: 2021-01-27 | Stop reason: SDUPTHER

## 2020-01-22 ASSESSMENT — ENCOUNTER SYMPTOMS
TROUBLE SWALLOWING: 0
SHORTNESS OF BREATH: 0
VOMITING: 0
CONSTIPATION: 0
BACK PAIN: 1
DIARRHEA: 0

## 2020-01-22 NOTE — PROGRESS NOTES
membrane, ear canal and external ear normal.      Nose: Nose normal.      Right Sinus: No maxillary sinus tenderness or frontal sinus tenderness. Left Sinus: No maxillary sinus tenderness or frontal sinus tenderness. Mouth/Throat:      Mouth: Mucous membranes are moist.      Pharynx: Oropharynx is clear. Uvula midline. No oropharyngeal exudate or posterior oropharyngeal erythema. Eyes:      Extraocular Movements: Extraocular movements intact. Conjunctiva/sclera: Conjunctivae normal.      Pupils: Pupils are equal, round, and reactive to light. Neck:      Musculoskeletal: Normal range of motion and neck supple. Trachea: No tracheal deviation. Cardiovascular:      Rate and Rhythm: Normal rate and regular rhythm. Pulses: Normal pulses. Heart sounds: Normal heart sounds. Pulmonary:      Effort: Pulmonary effort is normal. No respiratory distress. Breath sounds: Normal breath sounds. Abdominal:      General: Bowel sounds are normal.      Palpations: Abdomen is soft. Tenderness: There is no tenderness. Musculoskeletal:      Right lower leg: No edema. Left lower leg: No edema. Lymphadenopathy:      Cervical: No cervical adenopathy. Skin:     General: Skin is warm and dry. Capillary Refill: Capillary refill takes less than 2 seconds. Neurological:      General: No focal deficit present. Mental Status: He is alert and oriented to person, place, and time. Psychiatric:         Mood and Affect: Mood normal.         Behavior: Behavior normal.         Thought Content: Thought content normal.         Judgment: Judgment normal.         /68 (Site: Left Upper Arm, Position: Sitting, Cuff Size: Large Adult)   Pulse 80   Temp 97.6 °F (36.4 °C) (Temporal)   Resp 20   Ht 5' 6\" (1.676 m)   Wt 215 lb (97.5 kg)   SpO2 98%   BMI 34.70 kg/m²      ASSESSMENT:      ICD-10-CM    1.  Essential hypertension I10 amLODIPine (NORVASC) 5 MG tablet     olmesartan

## 2020-01-22 NOTE — LETTER
· I will actively participate in any program designed to improve function, including social, physical, psychological and daily or work activities. 2. I will not use illegal or street drugs or another person's prescription. If I have an addiction problem with drugs or alcohol and my provider asks me to enter a program to address this issue, I agree to follow through. Such programs may include:  · 12-Step program and securing a sponsor  · Individual counseling   · Inpatient or outpatient treatment  · Other:_____________________________________________________________________________________________________________________________________________    If in treatment, I will request that a copy of the programs initial evaluation and treatment recommendations be sent to this provider and will not expect refills until that is received. I will also request written monthly updates be sent to this provider to verify my continuing treatment. 3. I will consent to drug screening upon my providers request to assure I am only taking the prescribed drugs, described in this MEDICATION AGREEMENT. I understand that a drug screen is a laboratory test in which a sample of my urine, blood or saliva is checked to see what drugs I have been taking. 4. I agree that I will treat the providers and staff at this office with respect at all times. I will keep all of my scheduled appointments, but if I need to cancel my appointment, I will do so a minimum of 24 hours before it is scheduled. 5. I understand that this provider may stop prescribing the medications listed if:  · I do not show any improvement in pain, or my activity has not improved. · I develop rapid tolerance or loss of improvement, as described in my treatment plan. · I develop significant side effects from the medication.   · My behavior is inconsistent with the responsibilities outlined above, which may also result in my being prevented from receiving further care from this office. · Other:____________________________________________________________________    AGREEMENT:    I have read the above and have had all of my questions answered. For chronic disease management, I know that my symptoms can be managed with many types of treatments. A chronic medication trial may be part of my treatment, but I must be an active participant in my care. Medication therapy is only one part of my symptom management plan. In some cases, there may be limited scientific evidence to support the chronic use of certain medications to improve symptoms and daily function. Furthermore, in certain circumstances, there may be scientific information that suggests that use of chronic controlled substances may actually worsen my symptoms and increase my risk of unintentional death directly related to this medication therapy. I know that if my provider feels my risk from controlled medications is greater than my benefit, I will have my controlled substance medication(s) compassionately lowered or removed altogether. I agree to a controlled substance medication trial.      I further agree to allow this office to contact my HIPAA contact on file if there are concerns about my safety and use of controlled medications. I have agreed to use the following medications above as instructed by my physician and as stated in this [address].      Patient Signature:  ______________________  Date:1/22/2020 or _____________    Provider Signature:______________________  Date:1/22/2020 or _____________

## 2020-02-26 ENCOUNTER — TELEPHONE (OUTPATIENT)
Dept: FAMILY MEDICINE CLINIC | Age: 60
End: 2020-02-26

## 2020-02-26 RX ORDER — GABAPENTIN 300 MG/1
900 CAPSULE ORAL 3 TIMES DAILY
Qty: 810 CAPSULE | Refills: 0 | OUTPATIENT
Start: 2020-02-26 | End: 2020-05-27 | Stop reason: SDUPTHER

## 2020-02-26 NOTE — TELEPHONE ENCOUNTER
New script for 90 day supply called to [facility] in [place]. Left voice mail for patient.

## 2020-05-27 ENCOUNTER — VIRTUAL VISIT (OUTPATIENT)
Dept: FAMILY MEDICINE CLINIC | Age: 60
End: 2020-05-27
Payer: COMMERCIAL

## 2020-05-27 PROCEDURE — 99214 OFFICE O/P EST MOD 30 MIN: CPT | Performed by: NURSE PRACTITIONER

## 2020-05-27 RX ORDER — TERBINAFINE HYDROCHLORIDE 250 MG/1
250 TABLET ORAL DAILY
Qty: 84 TABLET | Refills: 0 | Status: SHIPPED | OUTPATIENT
Start: 2020-05-27 | End: 2020-08-17 | Stop reason: SDUPTHER

## 2020-05-27 RX ORDER — GABAPENTIN 300 MG/1
900 CAPSULE ORAL 3 TIMES DAILY
Qty: 810 CAPSULE | Refills: 0 | Status: SHIPPED | OUTPATIENT
Start: 2020-05-27 | End: 2020-09-25 | Stop reason: SDUPTHER

## 2020-05-27 RX ORDER — ATORVASTATIN CALCIUM 40 MG/1
20 TABLET, FILM COATED ORAL DAILY
Qty: 90 TABLET | Refills: 1 | Status: SHIPPED | OUTPATIENT
Start: 2020-05-27 | End: 2021-01-27 | Stop reason: SDUPTHER

## 2020-05-27 ASSESSMENT — ENCOUNTER SYMPTOMS: SHORTNESS OF BREATH: 0

## 2020-05-27 NOTE — PROGRESS NOTES
2020    TELEHEALTH EVALUATION -- Audio/Visual (During GSE-49 public health emergency)    Chief Complaint   Patient presents with    Discuss Medications    Other     regional pain syndrome           Lamont Licona (:  1960) has requested an audio/video evaluation for the following concern(s):  HPI:  Patient is participating in video visit in regard to need for medication refills and lab orders. Patient did have lab work 2020 and was noted to have impaired fasting glucose. Patient has not reported any weight gain since that time and vitals are not available for review today. Regional pain syndrome  Foot pain and does move. Pt states that it is now affecting the top of foot. Pt has been evaluated by Rose Cooper and has been dx with regional pain syndrome and has improvement with gabapentin. Knee pain  Pt does report that he has spoken with ortho in [place], [place] regarding knees. He has had xray bilat knees and dx with OA. Pt did have injection to right knee and reports that it did help. Hyperlipidemia  Patient is continuing to use Lipitor nightly. Patient states that he has continued this medication but believes he has picked up his last refill that the pharmacy has on file. Last lab work was 2020 and results were acceptable at that time. Patient is a smoker and is trying to stop but has failed prescription smoking cessation medications. At last visit in 2020, patient was noted to have onychomycosis of great nail. Patient then had lab work and was hoping to start Lamisil. Lamisil was not started and patient continues to want to try this medication. Review of Systems   Constitutional: Negative for unexpected weight change. Respiratory: Negative for shortness of breath. Cardiovascular: Negative for leg swelling. Musculoskeletal: Positive for arthralgias. Neurological: Negative for weakness. Psychiatric/Behavioral: Negative for sleep disturbance. History:   Procedure Laterality Date    FINGER SURGERY      OTHER SURGICAL HISTORY  2017    colonoscopy done at [facility] COLONOSCOPY W/BIOPSY SINGLE/MULTIPLE N/A 2017    Dr Genaro Jackson x 4--5 yr recall   ,   Social History     Tobacco Use    Smoking status: Former Smoker     Packs/day: 1.50     Years: 45.00     Pack years: 67.50     Types: Cigarettes     Last attempt to quit: 1973     Years since quittin.5    Smokeless tobacco: Never Used   Substance Use Topics    Alcohol use: No    Drug use: No   ,   Family History   Problem Relation Age of Onset    High Blood Pressure Mother     Cancer Mother        PHYSICAL EXAMINATION:  [ INSTRUCTIONS:  \"[x]\" Indicates a positive item  \"[]\" Indicates a negative item  -- DELETE ALL ITEMS NOT EXAMINED]  Vital Signs: There were no vitals taken for this visit. No vitals provided per pt. Constitutional: [x] Appears well-developed and well-nourished [x] No apparent distress      [] Abnormal-   Mental status  [x] Alert and awake  [x] Oriented to person/place/time [x]Able to follow commands      Eyes:  EOM    [x]  Normal  [] Abnormal-  Sclera  [x]  Normal  [] Abnormal -         Discharge [x]  None visible  [] Abnormal -    HENT:   [x] Normocephalic, atraumatic.   [] Abnormal   [x] Mouth/Throat: Mucous membranes are moist.     External Ears [x] Normal  [] Abnormal-     Neck: [x] No visualized mass     Pulmonary/Chest: [x] Respiratory effort normal.  [x] No visualized signs of difficulty breathing or respiratory distress        [] Abnormal-      Musculoskeletal:   [x] Normal gait with no signs of ataxia         [x] Normal range of motion of neck        [] Abnormal-       Neurological:        [x] No Facial Asymmetry (Cranial nerve 7 motor function) (limited exam to video visit)          [x] No gaze palsy        [] Abnormal-         Skin:        [x] No significant exanthematous lesions or discoloration noted on facial skin         [] Abnormal- problems, and seek emergency medical treatment and/or call 911 if deemed necessary. Services were provided through a video synchronous discussion virtually to substitute for in-person clinic visit. Patient and provider were located at their individual homes. --GIOVANNY Jaramillo on 5/27/2020 at 10:34 AM    An electronic signature was used to authenticate this note.

## 2020-05-28 DIAGNOSIS — E78.2 MIXED HYPERLIPIDEMIA: ICD-10-CM

## 2020-05-28 DIAGNOSIS — I10 ESSENTIAL HYPERTENSION: ICD-10-CM

## 2020-05-28 DIAGNOSIS — R73.01 IFG (IMPAIRED FASTING GLUCOSE): ICD-10-CM

## 2020-05-28 LAB
ALBUMIN SERPL-MCNC: 4.7 G/DL (ref 3.5–5.2)
ALP BLD-CCNC: 89 U/L (ref 40–130)
ALT SERPL-CCNC: 21 U/L (ref 5–41)
ANION GAP SERPL CALCULATED.3IONS-SCNC: 13 MMOL/L (ref 7–19)
AST SERPL-CCNC: 17 U/L (ref 5–40)
BASOPHILS ABSOLUTE: 0.1 K/UL (ref 0–0.2)
BASOPHILS RELATIVE PERCENT: 1.1 % (ref 0–1)
BILIRUB SERPL-MCNC: 0.3 MG/DL (ref 0.2–1.2)
BILIRUBIN URINE: NEGATIVE
BLOOD, URINE: NEGATIVE
BUN BLDV-MCNC: 22 MG/DL (ref 8–23)
CALCIUM SERPL-MCNC: 9.9 MG/DL (ref 8.8–10.2)
CHLORIDE BLD-SCNC: 104 MMOL/L (ref 98–111)
CHOLESTEROL, TOTAL: 159 MG/DL (ref 160–199)
CLARITY: CLEAR
CO2: 26 MMOL/L (ref 22–29)
COLOR: YELLOW
CREAT SERPL-MCNC: 0.7 MG/DL (ref 0.5–1.2)
EOSINOPHILS ABSOLUTE: 0.6 K/UL (ref 0–0.6)
EOSINOPHILS RELATIVE PERCENT: 6.2 % (ref 0–5)
GFR NON-AFRICAN AMERICAN: >60
GLUCOSE BLD-MCNC: 115 MG/DL (ref 74–109)
GLUCOSE URINE: NEGATIVE MG/DL
HBA1C MFR BLD: 5.9 % (ref 4–6)
HCT VFR BLD CALC: 45.2 % (ref 42–52)
HDLC SERPL-MCNC: 41 MG/DL (ref 55–121)
HEMOGLOBIN: 15.7 G/DL (ref 14–18)
IMMATURE GRANULOCYTES #: 0 K/UL
KETONES, URINE: NEGATIVE MG/DL
LDL CHOLESTEROL CALCULATED: 103 MG/DL
LEUKOCYTE ESTERASE, URINE: NEGATIVE
LYMPHOCYTES ABSOLUTE: 2.9 K/UL (ref 1.1–4.5)
LYMPHOCYTES RELATIVE PERCENT: 29.6 % (ref 20–40)
MAGNESIUM: 2.3 MG/DL (ref 1.6–2.4)
MCH RBC QN AUTO: 33.1 PG (ref 27–31)
MCHC RBC AUTO-ENTMCNC: 34.7 G/DL (ref 33–37)
MCV RBC AUTO: 95.2 FL (ref 80–94)
MONOCYTES ABSOLUTE: 0.7 K/UL (ref 0–0.9)
MONOCYTES RELATIVE PERCENT: 7.2 % (ref 0–10)
NEUTROPHILS ABSOLUTE: 5.4 K/UL (ref 1.5–7.5)
NEUTROPHILS RELATIVE PERCENT: 55.6 % (ref 50–65)
NITRITE, URINE: NEGATIVE
PDW BLD-RTO: 13.2 % (ref 11.5–14.5)
PH UA: 7.5 (ref 5–8)
PLATELET # BLD: 198 K/UL (ref 130–400)
PMV BLD AUTO: 11.3 FL (ref 9.4–12.4)
POTASSIUM REFLEX MAGNESIUM: 3.5 MMOL/L (ref 3.5–5)
PROTEIN UA: NEGATIVE MG/DL
RBC # BLD: 4.75 M/UL (ref 4.7–6.1)
SODIUM BLD-SCNC: 143 MMOL/L (ref 136–145)
SPECIFIC GRAVITY UA: 1.02 (ref 1–1.03)
TOTAL PROTEIN: 7 G/DL (ref 6.6–8.7)
TRIGL SERPL-MCNC: 75 MG/DL (ref 0–149)
TSH SERPL DL<=0.05 MIU/L-ACNC: 0.9 UIU/ML (ref 0.27–4.2)
UROBILINOGEN, URINE: 1 E.U./DL
WBC # BLD: 9.7 K/UL (ref 4.8–10.8)

## 2020-09-25 ENCOUNTER — VIRTUAL VISIT (OUTPATIENT)
Dept: FAMILY MEDICINE CLINIC | Age: 60
End: 2020-09-25
Payer: COMMERCIAL

## 2020-09-25 PROCEDURE — 99213 OFFICE O/P EST LOW 20 MIN: CPT | Performed by: NURSE PRACTITIONER

## 2020-09-25 RX ORDER — GABAPENTIN 300 MG/1
900 CAPSULE ORAL 3 TIMES DAILY
Qty: 810 CAPSULE | Refills: 0 | Status: SHIPPED | OUTPATIENT
Start: 2020-09-25 | End: 2020-12-16 | Stop reason: SDUPTHER

## 2020-09-25 ASSESSMENT — ENCOUNTER SYMPTOMS: BACK PAIN: 1

## 2020-09-25 NOTE — PROGRESS NOTES
tablet by mouth every 8 hours as needed for Pain  Patient taking differently: Take 1,300 mg by mouth three times daily   GIOVANNY Garza       Social History     Tobacco Use    Smoking status: Former Smoker     Packs/day: 1.50     Years: 45.00     Pack years: 67.50     Types: Cigarettes     Last attempt to quit: 1973     Years since quittin.8    Smokeless tobacco: Never Used   Substance Use Topics    Alcohol use: No    Drug use: No        Allergies   Allergen Reactions    Penicillins Hives     Other reaction(s): Other (see comments)  Patient states he passed out the last time he took penicillin    ,   Past Medical History:   Diagnosis Date    Complex regional pain syndrome i of right lower limb 2018    GERD (gastroesophageal reflux disease)     Hyperlipidemia     Hypertension    ,   Past Surgical History:   Procedure Laterality Date    FINGER SURGERY      OTHER SURGICAL HISTORY  2017    colonoscopy done at [facility] COLONOSCOPY W/BIOPSY SINGLE/MULTIPLE N/A 2017    Dr Halley Pastor x 4--5 yr recall   ,   Social History     Tobacco Use    Smoking status: Former Smoker     Packs/day: 1.50     Years: 45.00     Pack years: 67.50     Types: Cigarettes     Last attempt to quit: 1973     Years since quittin.8    Smokeless tobacco: Never Used   Substance Use Topics    Alcohol use: No    Drug use: No   ,   Family History   Problem Relation Age of Onset    High Blood Pressure Mother     Cancer Mother        PHYSICAL EXAMINATION:  [ INSTRUCTIONS:  \"[x]\" Indicates a positive item  \"[]\" Indicates a negative item  -- DELETE ALL ITEMS NOT EXAMINED]  Vital Signs: There were no vitals taken for this visit. No flowsheet data found.   Constitutional: [x] Appears well-developed and well-nourished [x] No apparent distress      [] Abnormal-   Mental status  [x] Alert and awake  [x] Oriented to person/place/time [x]Able to follow commands      Eyes:  EOM    [x]  Normal  [] guardian's) consent, to reduce the patient's risk of exposure to COVID-19 and provide necessary medical care. The patient (and/or legal guardian) has also been advised to contact this office for worsening conditions or problems, and seek emergency medical treatment and/or call 911 if deemed necessary. Services were provided through a video synchronous discussion virtually to substitute for in-person clinic visit. Patient and provider were located at their individual homes. --GIOVANNY Moreira on 9/25/2020 at 2:52 PM    An electronic signature was used to authenticate this note.

## 2020-09-28 ENCOUNTER — TELEPHONE (OUTPATIENT)
Dept: FAMILY MEDICINE CLINIC | Age: 60
End: 2020-09-28

## 2020-09-28 NOTE — TELEPHONE ENCOUNTER
DOMINGO was reviewed today per office protocol. Report shows No discrepancies. Fill pattern is consistent from single provider(s) at single pharmacy(s).

## 2020-12-16 RX ORDER — ATORVASTATIN CALCIUM 40 MG/1
TABLET, FILM COATED ORAL
Qty: 90 TABLET | Refills: 0 | Status: SHIPPED | OUTPATIENT
Start: 2020-12-16 | End: 2021-01-27 | Stop reason: SDUPTHER

## 2020-12-16 RX ORDER — GABAPENTIN 300 MG/1
900 CAPSULE ORAL 3 TIMES DAILY
Qty: 810 CAPSULE | Refills: 0 | Status: SHIPPED | OUTPATIENT
Start: 2020-12-16 | End: 2021-01-27 | Stop reason: SDUPTHER

## 2021-01-27 ENCOUNTER — OFFICE VISIT (OUTPATIENT)
Dept: FAMILY MEDICINE CLINIC | Age: 61
End: 2021-01-27
Payer: COMMERCIAL

## 2021-01-27 VITALS
SYSTOLIC BLOOD PRESSURE: 108 MMHG | OXYGEN SATURATION: 98 % | RESPIRATION RATE: 18 BRPM | HEIGHT: 66 IN | WEIGHT: 206 LBS | TEMPERATURE: 97.9 F | BODY MASS INDEX: 33.11 KG/M2 | HEART RATE: 93 BPM | DIASTOLIC BLOOD PRESSURE: 68 MMHG

## 2021-01-27 DIAGNOSIS — E78.2 MIXED HYPERLIPIDEMIA: ICD-10-CM

## 2021-01-27 DIAGNOSIS — R73.01 IFG (IMPAIRED FASTING GLUCOSE): ICD-10-CM

## 2021-01-27 DIAGNOSIS — Z13.21 ENCOUNTER FOR VITAMIN DEFICIENCY SCREENING: ICD-10-CM

## 2021-01-27 DIAGNOSIS — I10 ESSENTIAL HYPERTENSION: ICD-10-CM

## 2021-01-27 DIAGNOSIS — G90.521 COMPLEX REGIONAL PAIN SYNDROME I OF RIGHT LOWER LIMB: ICD-10-CM

## 2021-01-27 DIAGNOSIS — E78.2 MIXED HYPERLIPIDEMIA: Primary | ICD-10-CM

## 2021-01-27 DIAGNOSIS — B35.1 ONYCHOMYCOSIS: ICD-10-CM

## 2021-01-27 DIAGNOSIS — Z12.5 PROSTATE CANCER SCREENING: ICD-10-CM

## 2021-01-27 LAB
ALBUMIN SERPL-MCNC: 4.3 G/DL (ref 3.5–5.2)
ALCOHOL URINE: NORMAL
ALP BLD-CCNC: 97 U/L (ref 40–130)
ALT SERPL-CCNC: 18 U/L (ref 5–41)
AMPHETAMINE SCREEN, URINE: NEGATIVE
ANION GAP SERPL CALCULATED.3IONS-SCNC: 12 MMOL/L (ref 7–19)
AST SERPL-CCNC: 16 U/L (ref 5–40)
BARBITURATE SCREEN, URINE: NEGATIVE
BASOPHILS ABSOLUTE: 0.1 K/UL (ref 0–0.2)
BASOPHILS RELATIVE PERCENT: 1.1 % (ref 0–1)
BENZODIAZEPINE SCREEN, URINE: NEGATIVE
BILIRUB SERPL-MCNC: <0.2 MG/DL (ref 0.2–1.2)
BILIRUBIN URINE: NEGATIVE
BLOOD, URINE: NEGATIVE
BUN BLDV-MCNC: 22 MG/DL (ref 8–23)
BUPRENORPHINE URINE: NEGATIVE
CALCIUM SERPL-MCNC: 10.1 MG/DL (ref 8.8–10.2)
CHLORIDE BLD-SCNC: 106 MMOL/L (ref 98–111)
CHOLESTEROL, TOTAL: 135 MG/DL (ref 160–199)
CLARITY: CLEAR
CO2: 26 MMOL/L (ref 22–29)
COCAINE METABOLITE SCREEN URINE: NEGATIVE
COLOR: YELLOW
CREAT SERPL-MCNC: 0.7 MG/DL (ref 0.5–1.2)
EOSINOPHILS ABSOLUTE: 0.3 K/UL (ref 0–0.6)
EOSINOPHILS RELATIVE PERCENT: 3.3 % (ref 0–5)
FENTANYL SCREEN, URINE: NORMAL
GABAPENTIN SCREEN, URINE: NORMAL
GFR AFRICAN AMERICAN: >59
GFR NON-AFRICAN AMERICAN: >60
GLUCOSE BLD-MCNC: 121 MG/DL (ref 74–109)
GLUCOSE URINE: NEGATIVE MG/DL
HBA1C MFR BLD: 6 % (ref 4–6)
HCT VFR BLD CALC: 45.5 % (ref 42–52)
HDLC SERPL-MCNC: 40 MG/DL (ref 55–121)
HEMOGLOBIN: 15.1 G/DL (ref 14–18)
IMMATURE GRANULOCYTES #: 0 K/UL
KETONES, URINE: NEGATIVE MG/DL
LDL CHOLESTEROL CALCULATED: 77 MG/DL
LEUKOCYTE ESTERASE, URINE: NEGATIVE
LYMPHOCYTES ABSOLUTE: 3.5 K/UL (ref 1.1–4.5)
LYMPHOCYTES RELATIVE PERCENT: 38.8 % (ref 20–40)
MCH RBC QN AUTO: 32.3 PG (ref 27–31)
MCHC RBC AUTO-ENTMCNC: 33.2 G/DL (ref 33–37)
MCV RBC AUTO: 97.2 FL (ref 80–94)
MDMA URINE: NEGATIVE
METHADONE SCREEN, URINE: NEGATIVE
METHAMPHETAMINE, URINE: NEGATIVE
MONOCYTES ABSOLUTE: 0.7 K/UL (ref 0–0.9)
MONOCYTES RELATIVE PERCENT: 7.5 % (ref 0–10)
NEUTROPHILS ABSOLUTE: 4.4 K/UL (ref 1.5–7.5)
NEUTROPHILS RELATIVE PERCENT: 49.1 % (ref 50–65)
NITRITE, URINE: NEGATIVE
OPIATE SCREEN URINE: NEGATIVE
OXYCODONE SCREEN URINE: NEGATIVE
PDW BLD-RTO: 12.8 % (ref 11.5–14.5)
PH UA: 7 (ref 5–8)
PHENCYCLIDINE SCREEN URINE: NEGATIVE
PLATELET # BLD: 211 K/UL (ref 130–400)
PMV BLD AUTO: 11.1 FL (ref 9.4–12.4)
POTASSIUM REFLEX MAGNESIUM: 3.6 MMOL/L (ref 3.5–5)
PROPOXYPHENE SCREEN, URINE: NORMAL
PROSTATE SPECIFIC ANTIGEN: 0.81 NG/ML (ref 0–4)
PROTEIN UA: NEGATIVE MG/DL
RBC # BLD: 4.68 M/UL (ref 4.7–6.1)
SODIUM BLD-SCNC: 144 MMOL/L (ref 136–145)
SPECIFIC GRAVITY UA: 1.02 (ref 1–1.03)
SYNTHETIC CANNABINOIDS(K2) SCREEN, URINE: NORMAL
THC SCREEN, URINE: NEGATIVE
TOTAL PROTEIN: 6.8 G/DL (ref 6.6–8.7)
TRAMADOL SCREEN URINE: NORMAL
TRICYCLIC ANTIDEPRESSANTS, UR: NORMAL
TRIGL SERPL-MCNC: 90 MG/DL (ref 0–149)
UROBILINOGEN, URINE: 1 E.U./DL
VITAMIN D 25-HYDROXY: 28.5 NG/ML
WBC # BLD: 8.9 K/UL (ref 4.8–10.8)

## 2021-01-27 PROCEDURE — 80305 DRUG TEST PRSMV DIR OPT OBS: CPT | Performed by: NURSE PRACTITIONER

## 2021-01-27 PROCEDURE — 99214 OFFICE O/P EST MOD 30 MIN: CPT | Performed by: NURSE PRACTITIONER

## 2021-01-27 RX ORDER — TERBINAFINE HYDROCHLORIDE 250 MG/1
TABLET ORAL
Qty: 60 TABLET | Refills: 0 | Status: SHIPPED | OUTPATIENT
Start: 2021-01-27 | End: 2021-07-19 | Stop reason: ALTCHOICE

## 2021-01-27 RX ORDER — CHLORTHALIDONE 25 MG/1
TABLET ORAL
Qty: 90 TABLET | Refills: 3 | Status: SHIPPED | OUTPATIENT
Start: 2021-01-27 | End: 2021-07-19 | Stop reason: SDUPTHER

## 2021-01-27 RX ORDER — GABAPENTIN 300 MG/1
900 CAPSULE ORAL 3 TIMES DAILY
Qty: 810 CAPSULE | Refills: 0 | Status: SHIPPED | OUTPATIENT
Start: 2021-01-27 | End: 2021-02-05 | Stop reason: SDUPTHER

## 2021-01-27 RX ORDER — AMLODIPINE BESYLATE 5 MG/1
5 TABLET ORAL DAILY
Qty: 90 TABLET | Refills: 3 | Status: SHIPPED | OUTPATIENT
Start: 2021-01-27 | End: 2021-07-19 | Stop reason: SDUPTHER

## 2021-01-27 RX ORDER — OLMESARTAN MEDOXOMIL 40 MG/1
40 TABLET ORAL DAILY
Qty: 90 TABLET | Refills: 3 | Status: SHIPPED | OUTPATIENT
Start: 2021-01-27 | End: 2021-07-19 | Stop reason: SDUPTHER

## 2021-01-27 RX ORDER — ATORVASTATIN CALCIUM 40 MG/1
TABLET, FILM COATED ORAL
Qty: 90 TABLET | Refills: 3 | Status: SHIPPED | OUTPATIENT
Start: 2021-01-27 | End: 2021-07-19 | Stop reason: SDUPTHER

## 2021-01-27 ASSESSMENT — PATIENT HEALTH QUESTIONNAIRE - PHQ9
SUM OF ALL RESPONSES TO PHQ QUESTIONS 1-9: 0
SUM OF ALL RESPONSES TO PHQ9 QUESTIONS 1 & 2: 0

## 2021-01-27 ASSESSMENT — ENCOUNTER SYMPTOMS
DIARRHEA: 0
TROUBLE SWALLOWING: 0
CONSTIPATION: 0
SHORTNESS OF BREATH: 0

## 2021-01-27 NOTE — LETTER
CONTROLLED SUBSTANCE MEDICATION AGREEMENT     Patient Name: Song Cabello  Patient YOB: 1960   I understand, that controlled substance medications may be used to help better manage my symptoms and to improve my ability to function at home, work and in social settings. However, I also understand that these medications do have risks, which have been discussed with me, including possible development of physical or psychological dependence. I understand that successful treatment requires mutual trust and honesty between me and my provider. I understand and agree that following this Medication Agreement is necessary in continuing my provider-patient relationship and the success of my treatment plan. Explanation from my Provider: Benefits and Goals of Controlled Substance Medications: There are two potential goals for your treatment: (1) decreased pain and suffering (2) improved daily life functions. There are many possible treatments for your chronic condition(s). Alternatives such as physical therapy, yoga, massage, home daily exercise, meditation, relaxation techniques, injections, chiropractic manipulations, surgery, cognitive therapy, hypnosis and many medications that are not habit-forming may be used. Use of controlled substance medications may be helpful, but they are unlikely to resolve all symptoms or restore all function.    Explanation from my Provider: Risks of Controlled Substance Medications: Opioid pain medications: These medications can lead to problems such as addiction/dependence, sedation, lightheadedness/dizziness, memory issues, falls, constipation, nausea, or vomiting. They may also impair the ability to drive or operate machinery. Additionally, these medications may lower testosterone levels, leading to loss of bone strength, stamina and sex drive. They may cause problems with breathing, sleep apnea and reduced coughing, which is especially dangerous for patients with lung disease. Overdose or dangerous interactions with alcohol and other medications may occur, leading to death. Hyperalgesia may develop, which means patients receiving opioids for the treatment of pain may become more sensitive to certain painful stimuli, and in some cases, experience pain from ordinarily non-painful stimuli. Women between the ages of 14-53 who could become pregnant should carefully weigh the risks and benefits of opioids with their physicians, as these medications increase the risk of pregnancy complications, including miscarriage,  delivery and stillbirth. It is also possible for babies to be born addicted to opioids. Opioid dependence withdrawal symptoms may include; feelings of uneasiness, increased pain, irritability, belly pain, diarrhea, sweats and goose-flesh. Benzodiazepines and non-benzodiazepine sleep medications: These medications can lead to problems such as addiction/dependence, sedation, fatigue, lightheadedness, dizziness, incoordination, falls, depression, hallucinations, and impaired judgment, memory and concentration. The ability to drive and operate machinery may also be affected. Abnormal sleep-related behaviors have been reported, including sleepwalking, driving, making telephone calls, eating, or having sex while not fully awake. These medications can suppress breathing and worsen sleep apnea, particularly when combined with alcohol or other sedating medications, potentially leading to death. Dependence withdrawal symptoms may include tremors, anxiety, hallucinations and seizures. Stimulants:  Common adverse effects include addiction/dependence, increased blood  pressure and heart rate, decreased appetite, nausea, involuntary weight loss, insomnia,                                                                                                                     Initials:_______   irritability, and headaches. These risks may increase when these medications are combined with other stimulants, such as caffeine pills or energy drinks, certain weight loss supplements and oral decongestants. Dependence withdrawal symptoms may include depressed mood, loss of interest, suicidal thoughts, anxiety, fatigue, appetite changes and agitation. Testosterone replacement therapy:  Potential side effects include increased risk of stroke and heart attack, blood clots, increased blood pressure, increased cholesterol, enlarged prostate, sleep apnea, irritability/aggression and other mood disorders, and decreased fertility. I agree and understand that I and my prescriber have the following rights and responsibilities regarding my treatment plan:     1. MY RIGHTS:  To be informed of my treatment and medication plan. To be an active participant in my health and wellbeing. 2. MY RESPONSIBILITY AND UNDERSTANDING FOR USE OF MEDICATIONS  ? I will take medications at the dose and frequency as directed. For my safety, I will not increase or change how I take my medications without the recommendation of my healthcare provider. ? I will actively participate in any program recommended by my provider which may improve function, including social, physical, psychological programs. ? I will not take my medications with alcohol or other drugs not prescribed to me. I understand that drinking alcohol with my medications increases the chances of side effects, including reduced breathing rate and could lead to personal injury when operating machinery. ? I understand that if I have a history of substance use disorders, including alcohol or other illicit drugs, that I may be at increased risk of addiction to my medications. ? I agree to notify my provider immediately if I should become pregnant so that my treatment plan can be adjusted. ? I agree and understand that I shall only receive controlled substance medications from the prescriber that signed this agreement unless there is written agreement among other prescribers of controlled substances outlining the responsibility of the medications being prescribed. ? I understand that the if the controlled medication is not helping to achieve goals, the dosage may be tapered and no longer prescribed. 3. MY RESPONSIBILITY FOR COMMUNICATION / PRESCRIPTION RENEWALS  ? I agree that all controlled substance medications that I take will be prescribed only by my provider. If another healthcare provider prescribes me medication in an emergency, I will notify my provider within seventy-two (72) hours. ? I will arrange for refills at the prescribed interval ONLY during regular office hours. I will not ask for refills earlier than agreed, after-hours, on holidays or weekends. Refills may take up to 72 hours for processing and prescriptions to reach the pharmacy. ? I will inform my other health care providers that I am taking these medications and of the existence of this [address]. In the event of an emergency, I will provide the same information to the emergency department prescribers. ? I will keep my provider updated on the pharmacy I am using for controlled medication prescription filling. Initials:_______  4. MY RESPONSIBILITY FOR PROTECTING MEDICATIONS  ? I will protect my prescriptions and medications. I understand that lost or misplaced prescriptions will not be replaced. ? I will keep medications only for my own use and will not share them with others. I will keep all medications away from children. ? I agree that if my medications are adjusted or discontinued, I will properly dispose of any remaining medications. I understand that I will be required to dispose of any remaining controlled medications as, directed by my prescriber, prior to being provided with any prescriptions for other controlled medications. Medication drop box locations can be found at: [email or web address]    5. MY RESPONSIBILITY WITH ILLEGAL DRUGS   ? I will not use illegal or street drugs or another person's prescription medications not prescribed to me.   ? If there are identified addiction type symptoms, then referral to a program may be provided by my provider and I agree to follow through with this recommendation.   6. MY RESPONSIBILITY FOR COOPERATION WITH INVESTIGATIONS ? I understand that my provider will comply with any applicable law and may discuss my use and/or possible misuse/abuse of controlled substances and alcohol, as appropriate, with any health care provider involved in my care, pharmacist, or legal authority. ? I authorize my provider and pharmacy to cooperate fully with law enforcement agencies (as permitted by law) in the investigation of any possible misuse, sale, or other diversion of my controlled substances. ? I agree to waive any applicable privilege or right of privacy or confidentiality with respect to these authorizations. 7. PROVIDERS RIGHT TO MONITOR FOR SAFETY: PRESCRIPTION MONITORING / DRUG TESTING  ? I consent to drug/toxicology screening and will submit to a drug screen upon my providers request to assure I am only taking the prescribed drugs for my safety monitoring. I understand that a drug screen is a laboratory test in which a sample of my urine, blood or saliva is checked to see what drugs I have been taking. This may entail an observed urine specimen, which means that a nurse or other health care provider may watch me provide urine, and I will cooperate if I am asked to provide an observed specimen. ? I understand that my provider will check a copy of my State Prescription Monitoring Program () Report in order to safely prescribe medications. ? Pill Counts: I consent to pill counts when requested. I may be asked to bring all my prescribed controlled substance medications, in their original bottles, to all of my scheduled appointments. In addition, my provider may ask me to come to the practice at any time for a random pill count. 8. TERMINATION OF THIS AGREEMENT  For my safety, my prescriber has the right to stop prescribing controlled substance medications and may end this agreement. Initials:_______  ?  Conditions that may result in termination of this agreement: Patient Name (Printed): _____________________________________  Patient Signature:  ______________________   Date: _____________    Prescriber Name (Printed): ___________________________________  Prescriber Signature: _____________________  Date: _____________

## 2021-01-27 NOTE — PROGRESS NOTES
SUBJECTIVE:    Patient ID: Myesha Quiñonez is a58 y.o. male. Myesha Quiñonez is here today for Medication Refill (Patient presents for medication refills. Patient is fasting for blood work.)  . HPI:   HPI     Pt is here today for lab and medication refills. Patient does have complex regional pain syndrome of the right lower extremity. He has been evaluated at [facility] in the past and was started on gabapentin therapy and does find that this helps significantly. He has been continuing this treatment plan and is requesting refills today. Gabapentin 900 mg 3 times a day is his total dose that he is tolerating without any negative effects. Patient also has hyperlipidemia and is adhering to statin therapy. There is been no changes in tolerating this medication. Patient also has hypertension and is well controlled with olmesartan and HCTZ and amlodipine. Patient does have a blood pressure monitor at home and is able to check blood pressure regularly. Patient does have onychomycosis bilateral toenails. He does report that with the Lamisil treatment he has begun to see changes and is hoping to continue this plan. With some of the latest guideline recommendations of 4 weeks on and 4 weeks off we will continue with this plan. Patient is due for prostate blood testing today as well. Patient does feel that overall he is doing quite well. He denies any chest pains, shortness of breath, or any acute dizziness. Past Medical History:   Diagnosis Date    Complex regional pain syndrome i of right lower limb 5/30/2018    GERD (gastroesophageal reflux disease)     Hyperlipidemia     Hypertension      Prior to Visit Medications    Medication Sig Taking? Authorizing Provider   atorvastatin (LIPITOR) 40 MG tablet TAKE 0.5 TABLET BY MOUTH EVERY DAY Yes GIOVANNY Garza   gabapentin (NEURONTIN) 300 MG capsule Take 3 capsules by mouth 3 times daily for 90 days.  Yes GIOVANNY Long terbinafine (LAMISIL) 250 MG tablet 1 po daily for 4wks then no treatment for 4wks then restart once daily for 4wks Yes GIOVANNY Garza   amLODIPine (NORVASC) 5 MG tablet Take 1 tablet by mouth daily Yes GIOVANNY Garza   chlorthalidone (HYGROTON) 25 MG tablet TAKE ONE  TABLET BY MOUTH DAILY. Yes GIOVANNY Garza   olmesartan (BENICAR) 40 MG tablet Take 1 tablet by mouth daily Yes GIOVANNY Garza   Blood Pressure KIT Check bp 3x/wk   Dx-htn Yes GIOVANNY Garza   Multiple Vitamins-Minerals (CENTRUM SILVER 50+MEN) TABS Take by mouth Yes Historical Provider, MD   acetaminophen (TYLENOL 8 HOUR ARTHRITIS PAIN) 650 MG extended release tablet Take 1 tablet by mouth every 8 hours as needed for Pain  Patient taking differently: Take 1,300 mg by mouth three times daily  Yes GIOVANNY Gazra     Allergies   Allergen Reactions    Penicillins Hives     Other reaction(s):  Other (see comments)  Patient states he passed out the last time he took penicillin      Past Surgical History:   Procedure Laterality Date    FINGER SURGERY      OTHER SURGICAL HISTORY  2017    colonoscopy done at [facility] COLONOSCOPY W/BIOPSY SINGLE/MULTIPLE N/A 2017    Dr Joyner Rather x 4--5 yr recall     Family History   Problem Relation Age of Onset    High Blood Pressure Mother     Cancer Mother      Social History     Socioeconomic History    Marital status:      Spouse name: Not on file    Number of children: Not on file    Years of education: Not on file    Highest education level: Not on file   Occupational History    Not on file   Social Needs    Financial resource strain: Not on file    Food insecurity     Worry: Not on file     Inability: Not on file    Transportation needs     Medical: Not on file     Non-medical: Not on file   Tobacco Use    Smoking status: Former Smoker     Packs/day: 1.50     Years: 45.00     Pack years: 67.50     Types: Cigarettes     Quit date: 1973     Years since quittin.1  Smokeless tobacco: Never Used   Substance and Sexual Activity    Alcohol use: No    Drug use: No    Sexual activity: Yes     Partners: Female   Lifestyle    Physical activity     Days per week: Not on file     Minutes per session: Not on file    Stress: Not on file   Relationships    Social connections     Talks on phone: Not on file     Gets together: Not on file     Attends Church service: Not on file     Active member of club or organization: Not on file     Attends meetings of clubs or organizations: Not on file     Relationship status: Not on file    Intimate partner violence     Fear of current or ex partner: Not on file     Emotionally abused: Not on file     Physically abused: Not on file     Forced sexual activity: Not on file   Other Topics Concern    Not on file   Social History Narrative    Not on file       Review of Systems   Constitutional: Negative for unexpected weight change. HENT: Negative for trouble swallowing. Respiratory: Negative for shortness of breath. Cardiovascular: Negative for chest pain and leg swelling. Gastrointestinal: Negative for constipation and diarrhea. Neurological: Negative for weakness. OBJECTIVE:    Physical Exam  Vitals signs and nursing note reviewed. Constitutional:       General: He is not in acute distress. Appearance: Normal appearance. He is well-developed. He is not ill-appearing or toxic-appearing. HENT:      Head: Normocephalic and atraumatic. Eyes:      Extraocular Movements: Extraocular movements intact. Conjunctiva/sclera: Conjunctivae normal.      Pupils: Pupils are equal, round, and reactive to light. Neck:      Musculoskeletal: Normal range of motion and neck supple. Vascular: No carotid bruit. Trachea: No tracheal deviation. Cardiovascular:      Rate and Rhythm: Normal rate and regular rhythm. Heart sounds: Normal heart sounds.    Pulmonary:      Effort: Pulmonary effort is normal. Breath sounds: Normal breath sounds. Abdominal:      General: There is no distension. Palpations: Abdomen is soft. Musculoskeletal:      Right lower leg: No edema. Left lower leg: No edema. Lymphadenopathy:      Cervical: No cervical adenopathy. Skin:     General: Skin is warm and dry. Capillary Refill: Capillary refill takes less than 2 seconds. Neurological:      General: No focal deficit present. Mental Status: He is alert and oriented to person, place, and time. Mental status is at baseline. Psychiatric:         Mood and Affect: Mood normal. Mood is not anxious or depressed. Affect is not angry. Speech: Speech normal.         Behavior: Behavior normal.         Thought Content: Thought content normal.         Judgment: Judgment normal.         /68 (Site: Left Upper Arm, Position: Sitting, Cuff Size: Large Adult)   Pulse 93   Temp 97.9 °F (36.6 °C) (Temporal)   Resp 18   Ht 5' 6\" (1.676 m)   Wt 206 lb (93.4 kg)   SpO2 98%   BMI 33.25 kg/m²      ASSESSMENT:      ICD-10-CM    1. Mixed hyperlipidemia  E78.2 atorvastatin (LIPITOR) 40 MG tablet     Comprehensive Metabolic Panel w/ Reflex to MG     Lipid Panel     TSH with Reflex   2. Complex regional pain syndrome i of right lower limb  G90.521 gabapentin (NEURONTIN) 300 MG capsule     POCT Rapid Drug Screen   3. Onychomycosis  B35.1 terbinafine (LAMISIL) 250 MG tablet   4. Essential hypertension  I10 amLODIPine (NORVASC) 5 MG tablet     chlorthalidone (HYGROTON) 25 MG tablet     olmesartan (BENICAR) 40 MG tablet     Urinalysis Reflex to Culture     CBC Auto Differential   5. Prostate cancer screening  Z12.5 Psa screening   6. IFG (impaired fasting glucose)  R73.01 Hemoglobin A1C   7. Encounter for vitamin deficiency screening  Z13.21 Vitamin D 25 Hydroxy       PLAN:    Adine Calamity: Medication Refill (Patient presents for medication refills.  Patient is fasting for blood work.)  [employer]  Contract update needed Update UDS  Restart lamisil  Lab today  F/u pending lab review and in 6mo and prn. Diagnosis and orders for this visit are above. Please note that this chart was generated using dragon dictationsoftware. Although every effort was made to ensure the accuracy of this automated transcription, some errors in transcription may have occurred.

## 2021-02-01 LAB — TSH REFLEX FT4: 0.74 UIU/ML (ref 0.35–5.5)

## 2021-02-04 ENCOUNTER — TELEPHONE (OUTPATIENT)
Dept: FAMILY MEDICINE CLINIC | Age: 61
End: 2021-02-04

## 2021-02-04 DIAGNOSIS — G90.521 COMPLEX REGIONAL PAIN SYNDROME I OF RIGHT LOWER LIMB: ICD-10-CM

## 2021-02-04 NOTE — TELEPHONE ENCOUNTER
Patient called and states that the pharmacy didn't give him the 810 capsules of gabapentin. [facility] is saying that the medication was dispensed and that they did a pill count on the inventory and it is showing that it was dispensed. The patient has contacted the pharmacy manager, Cyndi Eli, and she has also called and states that the only thing that can be done is the provider can approve an early fill for 60 days of medication and the patient is willing to pay cash so he is not without his medication. Jg Kamara states that he is usually given a jug and a small bottle and this time he was only given two small bottles and brought that back to the pharmacy to show them the mistake. The patient leaves to go on the boat tomorrow at 4 am but he said that if this is resolved his wife can bring the medication to a tow boat store and they will bring him his medication. Please advise.

## 2021-02-05 RX ORDER — GABAPENTIN 300 MG/1
900 CAPSULE ORAL 3 TIMES DAILY
Qty: 540 CAPSULE | Refills: 0 | Status: SHIPPED | OUTPATIENT
Start: 2021-02-05 | End: 2021-07-19 | Stop reason: SDUPTHER

## 2021-07-19 ENCOUNTER — OFFICE VISIT (OUTPATIENT)
Dept: FAMILY MEDICINE CLINIC | Age: 61
End: 2021-07-19
Payer: COMMERCIAL

## 2021-07-19 VITALS
HEART RATE: 86 BPM | SYSTOLIC BLOOD PRESSURE: 110 MMHG | OXYGEN SATURATION: 98 % | RESPIRATION RATE: 20 BRPM | WEIGHT: 206 LBS | BODY MASS INDEX: 33.11 KG/M2 | DIASTOLIC BLOOD PRESSURE: 68 MMHG | TEMPERATURE: 97.6 F | HEIGHT: 66 IN

## 2021-07-19 DIAGNOSIS — E78.2 MIXED HYPERLIPIDEMIA: ICD-10-CM

## 2021-07-19 DIAGNOSIS — E55.9 VITAMIN D INSUFFICIENCY: ICD-10-CM

## 2021-07-19 DIAGNOSIS — R73.01 IFG (IMPAIRED FASTING GLUCOSE): ICD-10-CM

## 2021-07-19 DIAGNOSIS — I10 ESSENTIAL HYPERTENSION: ICD-10-CM

## 2021-07-19 DIAGNOSIS — G90.521 COMPLEX REGIONAL PAIN SYNDROME I OF RIGHT LOWER LIMB: ICD-10-CM

## 2021-07-19 DIAGNOSIS — R73.01 IFG (IMPAIRED FASTING GLUCOSE): Primary | ICD-10-CM

## 2021-07-19 LAB
ALBUMIN SERPL-MCNC: 4.3 G/DL (ref 3.5–5.2)
ALP BLD-CCNC: 89 U/L (ref 40–130)
ALT SERPL-CCNC: 18 U/L (ref 5–41)
ANION GAP SERPL CALCULATED.3IONS-SCNC: 11 MMOL/L (ref 7–19)
AST SERPL-CCNC: 21 U/L (ref 5–40)
BILIRUB SERPL-MCNC: 0.4 MG/DL (ref 0.2–1.2)
BUN BLDV-MCNC: 18 MG/DL (ref 8–23)
CALCIUM SERPL-MCNC: 10.3 MG/DL (ref 8.8–10.2)
CHLORIDE BLD-SCNC: 100 MMOL/L (ref 98–111)
CHOLESTEROL, TOTAL: 143 MG/DL (ref 160–199)
CO2: 30 MMOL/L (ref 22–29)
CREAT SERPL-MCNC: 0.7 MG/DL (ref 0.5–1.2)
GFR AFRICAN AMERICAN: >59
GFR NON-AFRICAN AMERICAN: >60
GLUCOSE BLD-MCNC: 86 MG/DL (ref 74–109)
HBA1C MFR BLD: 5.7 % (ref 4–6)
HDLC SERPL-MCNC: 42 MG/DL (ref 55–121)
LDL CHOLESTEROL CALCULATED: 88 MG/DL
MAGNESIUM: 1.9 MG/DL (ref 1.6–2.4)
POTASSIUM REFLEX MAGNESIUM: 3.3 MMOL/L (ref 3.5–5)
SODIUM BLD-SCNC: 141 MMOL/L (ref 136–145)
TOTAL PROTEIN: 7.1 G/DL (ref 6.6–8.7)
TRIGL SERPL-MCNC: 65 MG/DL (ref 0–149)
TSH REFLEX FT4: 0.76 UIU/ML (ref 0.35–5.5)
VITAMIN D 25-HYDROXY: 33.6 NG/ML

## 2021-07-19 PROCEDURE — 99214 OFFICE O/P EST MOD 30 MIN: CPT | Performed by: NURSE PRACTITIONER

## 2021-07-19 RX ORDER — OLMESARTAN MEDOXOMIL 40 MG/1
40 TABLET ORAL DAILY
Qty: 90 TABLET | Refills: 3 | Status: SHIPPED | OUTPATIENT
Start: 2021-07-19 | End: 2022-08-01 | Stop reason: SDUPTHER

## 2021-07-19 RX ORDER — GABAPENTIN 300 MG/1
900 CAPSULE ORAL 3 TIMES DAILY
Qty: 540 CAPSULE | Refills: 0 | Status: SHIPPED | OUTPATIENT
Start: 2021-07-19 | End: 2022-08-01 | Stop reason: SDUPTHER

## 2021-07-19 RX ORDER — AMLODIPINE BESYLATE 5 MG/1
5 TABLET ORAL DAILY
Qty: 90 TABLET | Refills: 3 | Status: SHIPPED | OUTPATIENT
Start: 2021-07-19 | End: 2022-08-01 | Stop reason: SDUPTHER

## 2021-07-19 RX ORDER — ATORVASTATIN CALCIUM 40 MG/1
TABLET, FILM COATED ORAL
Qty: 90 TABLET | Refills: 3 | Status: SHIPPED | OUTPATIENT
Start: 2021-07-19 | End: 2022-08-01 | Stop reason: SDUPTHER

## 2021-07-19 RX ORDER — CHLORTHALIDONE 25 MG/1
TABLET ORAL
Qty: 90 TABLET | Refills: 3 | Status: SHIPPED | OUTPATIENT
Start: 2021-07-19 | End: 2022-08-01 | Stop reason: SDUPTHER

## 2021-07-19 SDOH — ECONOMIC STABILITY: FOOD INSECURITY: WITHIN THE PAST 12 MONTHS, YOU WORRIED THAT YOUR FOOD WOULD RUN OUT BEFORE YOU GOT MONEY TO BUY MORE.: NEVER TRUE

## 2021-07-19 SDOH — ECONOMIC STABILITY: TRANSPORTATION INSECURITY
IN THE PAST 12 MONTHS, HAS LACK OF TRANSPORTATION KEPT YOU FROM MEETINGS, WORK, OR FROM GETTING THINGS NEEDED FOR DAILY LIVING?: NO

## 2021-07-19 SDOH — ECONOMIC STABILITY: TRANSPORTATION INSECURITY
IN THE PAST 12 MONTHS, HAS THE LACK OF TRANSPORTATION KEPT YOU FROM MEDICAL APPOINTMENTS OR FROM GETTING MEDICATIONS?: NO

## 2021-07-19 SDOH — ECONOMIC STABILITY: FOOD INSECURITY: WITHIN THE PAST 12 MONTHS, THE FOOD YOU BOUGHT JUST DIDN'T LAST AND YOU DIDN'T HAVE MONEY TO GET MORE.: NEVER TRUE

## 2021-07-19 ASSESSMENT — ENCOUNTER SYMPTOMS: RESPIRATORY NEGATIVE: 1

## 2021-07-19 ASSESSMENT — SOCIAL DETERMINANTS OF HEALTH (SDOH): HOW HARD IS IT FOR YOU TO PAY FOR THE VERY BASICS LIKE FOOD, HOUSING, MEDICAL CARE, AND HEATING?: NOT HARD AT ALL

## 2021-07-19 NOTE — PROGRESS NOTES
SUBJECTIVE:    Patient ID: Gardenia Penaloza is a58 y.o. male. Gardenia Penaloza is here today for Medication Refill (Patient presents for medication refills) and Health Maintenance (Patient had COVID vaccine and will send the card in a [employer] message.)  . HPI:   HPI     Patient is here today for follow-up on routine health care which includes hypertension-controlled with normotensive level here today. Patient is adhering to daily medication treatment plan with multidose regimen. Patient also has hyperlipidemia and is adhering to daily treatment plan. Patient does not necessarily always adhere to low-cholesterol intake. 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 Patient also has complex regional pain syndrome that affects his right lower leg. Patient has been evaluated by [facility] for this in the past and was started on gabapentin with successful treatment. He is requesting refill his medication while here today. He does have contract on file, Oswaldo's have been within normal range and UDS has been as expected. Patient also has history of GERD and is adhering to daily PPI treatment. He does report that symptoms are well controlled. uds and contract UTD. Past Medical History:   Diagnosis Date    Complex regional pain syndrome i of right lower limb 5/30/2018    GERD (gastroesophageal reflux disease)     Hyperlipidemia     Hypertension      Prior to Visit Medications    Medication Sig Taking? Authorizing Provider   atorvastatin (LIPITOR) 40 MG tablet TAKE 0.5 TABLET BY MOUTH EVERY DAY Yes GIOVANNY Garza   amLODIPine (NORVASC) 5 MG tablet Take 1 tablet by mouth daily Yes GIOVANNY Garza   chlorthalidone (HYGROTON) 25 MG tablet TAKE ONE  TABLET BY MOUTH DAILY.  Yes GIOVANNY Garza   olmesartan (BENICAR) 40 MG tablet Take 1 tablet by mouth daily Yes GIOVANNY Garza   gabapentin (NEURONTIN) 300 MG capsule Take 3 capsules by mouth 3 times daily for 90 days. Yes Leonila Guadalupe APRN   Blood Pressure KIT Check bp 3x/wk   Dx-htn Yes Leonilakiya Guadalupe APRN   Multiple Vitamins-Minerals (CENTRUM SILVER 50+MEN) TABS Take by mouth Yes Historical Provider, MD   acetaminophen (TYLENOL 8 HOUR ARTHRITIS PAIN) 650 MG extended release tablet Take 1 tablet by mouth every 8 hours as needed for Pain  Patient taking differently: Take 1,300 mg by mouth three times daily  Yes Leonilakiya Guadalupe APRN     Allergies   Allergen Reactions    Penicillins Hives     Other reaction(s):  Other (see comments)  Patient states he passed out the last time he took penicillin      Past Surgical History:   Procedure Laterality Date    FINGER SURGERY      OTHER SURGICAL HISTORY  2017    colonoscopy done at [facility] COLONOSCOPY W/BIOPSY SINGLE/MULTIPLE N/A 2017    Dr Jennifer Mart x 4--5 yr recall     Family History   Problem Relation Age of Onset    High Blood Pressure Mother     Cancer Mother      Social History     Socioeconomic History    Marital status:      Spouse name: Not on file    Number of children: Not on file    Years of education: Not on file    Highest education level: Not on file   Occupational History    Not on file   Tobacco Use    Smoking status: Former Smoker     Packs/day: 1.50     Years: 45.00     Pack years: 67.50     Types: Cigarettes     Quit date: 1973     Years since quittin.6    Smokeless tobacco: Never Used   Substance and Sexual Activity    Alcohol use: No    Drug use: No    Sexual activity: Yes     Partners: Female   Other Topics Concern    Not on file   Social History Narrative    Not on file     Social Determinants of Health     Financial Resource Strain: Low Risk     Difficulty of Paying Living Expenses: Not hard at all   Food Insecurity: No Food Insecurity    Worried About [address] [employer] in the Last Year: Never true    [address] [facility] [address] in the Last Year: Never true Transportation Needs: No Transportation Needs    Lack of Transportation (Medical): No    Lack of Transportation (Non-Medical): No   Physical Activity:     Days of Exercise per Week:     Minutes of Exercise per Session:    Stress:     Feeling of Stress :    Social Connections:     Frequency of Communication with Friends and Family:     Frequency of Social Gatherings with Friends and Family:     Attends [employer] Services:     Active Member of Clubs or Organizations:     Attends Club or Organization Meetings:     Marital Status:    Intimate Partner Violence:     Fear of Current or Ex-Partner:     Emotionally Abused:     Physically Abused:     Sexually Abused:        Review of Systems   Constitutional: Negative. Respiratory: Negative. Cardiovascular: Negative. Musculoskeletal: Positive for arthralgias (leg pain chronic). Neurological: Negative. Psychiatric/Behavioral: Negative. OBJECTIVE:    Physical Exam  Vitals and nursing note reviewed. Constitutional:       General: He is not in acute distress. Appearance: Normal appearance. He is well-developed. He is obese. He is not ill-appearing, toxic-appearing or diaphoretic. HENT:      Head: Normocephalic and atraumatic. Eyes:      Extraocular Movements: Extraocular movements intact. Conjunctiva/sclera: Conjunctivae normal.      Pupils: Pupils are equal, round, and reactive to light. Neck:      Vascular: No carotid bruit. Trachea: No tracheal deviation. Cardiovascular:      Rate and Rhythm: Normal rate and regular rhythm. Heart sounds: Normal heart sounds. Pulmonary:      Effort: Pulmonary effort is normal.      Breath sounds: Normal breath sounds. Abdominal:      General: There is no distension. Palpations: Abdomen is soft. Musculoskeletal:      Cervical back: Neck supple. No rigidity. Right lower leg: No edema. Left lower leg: No edema. Skin:     General: Skin is warm and dry. Capillary Refill: Capillary refill takes less than 2 seconds. Neurological:      General: No focal deficit present. Mental Status: He is alert and oriented to person, place, and time. Mental status is at baseline. Psychiatric:         Mood and Affect: Mood normal. Mood is not anxious or depressed. Affect is not angry. Speech: Speech normal.         Behavior: Behavior normal.         Thought Content: Thought content normal.         Judgment: Judgment normal.         /68 (Site: Left Upper Arm, Position: Sitting, Cuff Size: Large Adult)   Pulse 86   Temp 97.6 °F (36.4 °C) (Temporal)   Resp 20   Ht 5' 6\" (1.676 m)   Wt 206 lb (93.4 kg)   SpO2 98%   BMI 33.25 kg/m²      ASSESSMENT:      ICD-10-CM    1. IFG (impaired fasting glucose)  R73.01 Hemoglobin A1C   2. Complex regional pain syndrome i of right lower limb  G90.521 gabapentin (NEURONTIN) 300 MG capsule   3. Mixed hyperlipidemia  E78.2 atorvastatin (LIPITOR) 40 MG tablet     Comprehensive Metabolic Panel w/ Reflex to MG     Lipid Panel     TSH WITH REFLEX TO FT4   4. Essential hypertension  I10 amLODIPine (NORVASC) 5 MG tablet     chlorthalidone (HYGROTON) 25 MG tablet     olmesartan (BENICAR) 40 MG tablet     Comprehensive Metabolic Panel w/ Reflex to MG     Lipid Panel   5. Vitamin D insufficiency  E55.9 Vitamin D 25 Hydroxy       PLAN:    Maryam Patel: Medication Refill (Patient presents for medication refills) and Health Maintenance (Patient had COVID vaccine and will send the card in a [employer] message. )  [facility]  Contract and UDS on file. Lab today  Continue meds. Diagnosis and orders for this visit are above. Please note that this chart was generated using dragon dictationsoftware. Although every effort was made to ensure the accuracy of this automated transcription, some errors in transcription may have occurred.

## 2021-07-22 ENCOUNTER — PATIENT MESSAGE (OUTPATIENT)
Dept: FAMILY MEDICINE CLINIC | Age: 61
End: 2021-07-22

## 2021-07-22 DIAGNOSIS — G90.521 COMPLEX REGIONAL PAIN SYNDROME I OF RIGHT LOWER LIMB: ICD-10-CM

## 2021-07-23 RX ORDER — GABAPENTIN 300 MG/1
900 CAPSULE ORAL 3 TIMES DAILY
Qty: 810 CAPSULE | Refills: 0 | Status: SHIPPED | OUTPATIENT
Start: 2021-07-23 | End: 2022-01-05 | Stop reason: SDUPTHER

## 2021-07-23 NOTE — TELEPHONE ENCOUNTER
From: Rody Dey  To:  GIOVANNY Gee  Sent: 7/22/2021 8:39 PM CDT  Subject: Prescription Question    Sorry to bother you you wrote my prescription for 540 nuerotin capsules for 90 days, it should have been 810 can we correct this

## 2021-08-01 DIAGNOSIS — E87.6 HYPOKALEMIA: Primary | ICD-10-CM

## 2021-08-01 RX ORDER — POTASSIUM CHLORIDE 750 MG/1
10 TABLET, EXTENDED RELEASE ORAL DAILY
Qty: 90 TABLET | Refills: 0 | Status: SHIPPED | OUTPATIENT
Start: 2021-08-01 | End: 2021-11-10

## 2021-11-10 RX ORDER — POTASSIUM CHLORIDE 750 MG/1
TABLET, EXTENDED RELEASE ORAL
Qty: 90 TABLET | Refills: 0 | Status: SHIPPED | OUTPATIENT
Start: 2021-11-10 | End: 2022-01-05 | Stop reason: SDUPTHER

## 2022-01-05 ENCOUNTER — OFFICE VISIT (OUTPATIENT)
Dept: FAMILY MEDICINE CLINIC | Age: 62
End: 2022-01-05
Payer: COMMERCIAL

## 2022-01-05 VITALS
WEIGHT: 211 LBS | BODY MASS INDEX: 33.91 KG/M2 | TEMPERATURE: 97.9 F | DIASTOLIC BLOOD PRESSURE: 86 MMHG | HEIGHT: 66 IN | SYSTOLIC BLOOD PRESSURE: 136 MMHG | HEART RATE: 88 BPM | RESPIRATION RATE: 20 BRPM | OXYGEN SATURATION: 97 %

## 2022-01-05 DIAGNOSIS — E87.6 HYPOKALEMIA: ICD-10-CM

## 2022-01-05 DIAGNOSIS — R73.01 IFG (IMPAIRED FASTING GLUCOSE): ICD-10-CM

## 2022-01-05 DIAGNOSIS — Z23 FLU VACCINE NEED: ICD-10-CM

## 2022-01-05 DIAGNOSIS — I10 HYPERTENSION, UNSPECIFIED TYPE: ICD-10-CM

## 2022-01-05 DIAGNOSIS — Z11.59 SCREENING FOR VIRAL DISEASE: ICD-10-CM

## 2022-01-05 DIAGNOSIS — Z12.5 PROSTATE CANCER SCREENING: ICD-10-CM

## 2022-01-05 DIAGNOSIS — E55.9 VITAMIN D INSUFFICIENCY: ICD-10-CM

## 2022-01-05 DIAGNOSIS — G90.521 COMPLEX REGIONAL PAIN SYNDROME I OF RIGHT LOWER LIMB: ICD-10-CM

## 2022-01-05 DIAGNOSIS — E78.2 MIXED HYPERLIPIDEMIA: Primary | ICD-10-CM

## 2022-01-05 LAB
ALCOHOL URINE: NORMAL
AMPHETAMINE SCREEN, URINE: NEGATIVE
BARBITURATE SCREEN, URINE: NEGATIVE
BENZODIAZEPINE SCREEN, URINE: NEGATIVE
BUPRENORPHINE URINE: NEGATIVE
COCAINE METABOLITE SCREEN URINE: NEGATIVE
FENTANYL SCREEN, URINE: NORMAL
GABAPENTIN SCREEN, URINE: NORMAL
MDMA URINE: NEGATIVE
METHADONE SCREEN, URINE: NEGATIVE
METHAMPHETAMINE, URINE: NEGATIVE
OPIATE SCREEN URINE: NEGATIVE
OXYCODONE SCREEN URINE: NEGATIVE
PHENCYCLIDINE SCREEN URINE: NEGATIVE
PROPOXYPHENE SCREEN, URINE: NORMAL
SYNTHETIC CANNABINOIDS(K2) SCREEN, URINE: NORMAL
THC SCREEN, URINE: NEGATIVE
TRAMADOL SCREEN URINE: NORMAL
TRICYCLIC ANTIDEPRESSANTS, UR: NORMAL

## 2022-01-05 PROCEDURE — 80305 DRUG TEST PRSMV DIR OPT OBS: CPT | Performed by: NURSE PRACTITIONER

## 2022-01-05 PROCEDURE — 90674 CCIIV4 VAC NO PRSV 0.5 ML IM: CPT | Performed by: NURSE PRACTITIONER

## 2022-01-05 PROCEDURE — 99214 OFFICE O/P EST MOD 30 MIN: CPT | Performed by: NURSE PRACTITIONER

## 2022-01-05 PROCEDURE — 90471 IMMUNIZATION ADMIN: CPT | Performed by: NURSE PRACTITIONER

## 2022-01-05 RX ORDER — POTASSIUM CHLORIDE 750 MG/1
TABLET, EXTENDED RELEASE ORAL
Qty: 90 TABLET | Refills: 1 | Status: SHIPPED | OUTPATIENT
Start: 2022-01-05

## 2022-01-05 RX ORDER — GABAPENTIN 300 MG/1
900 CAPSULE ORAL 3 TIMES DAILY
Qty: 810 CAPSULE | Refills: 0 | Status: SHIPPED | OUTPATIENT
Start: 2022-01-05 | End: 2022-05-06

## 2022-01-05 NOTE — LETTER
CONTROLLED SUBSTANCE MEDICATION AGREEMENT     Patient Name: Germaine Castrejon  Patient YOB: 1960   I understand, that controlled substance medications may be used to help better manage my symptoms and to improve my ability to function at home, work and in social settings. However, I also understand that these medications do have risks, which have been discussed with me, including possible development of physical or psychological dependence. I understand that successful treatment requires mutual trust and honesty between me and my provider. I understand and agree that following this Medication Agreement is necessary in continuing my provider-patient relationship and the success of my treatment plan. Explanation from my Provider: Benefits and Goals of Controlled Substance Medications: There are two potential goals for your treatment: (1) decreased pain and suffering (2) improved daily life functions. There are many possible treatments for your chronic condition(s). Alternatives such as physical therapy, yoga, massage, home daily exercise, meditation, relaxation techniques, injections, chiropractic manipulations, surgery, cognitive therapy, hypnosis and many medications that are not habit-forming may be used. Use of controlled substance medications may be helpful, but they are unlikely to resolve all symptoms or restore all function. Explanation from my Provider: Risks of Controlled Substance Medications:  Opioid pain medications: These medications can lead to problems such as addiction/dependence, sedation, lightheadedness/dizziness, memory issues, falls, constipation, nausea, or vomiting. They may also impair the ability to drive or operate machinery. Additionally, these medications may lower testosterone levels, leading to loss of bone strength, stamina and sex drive.   They may cause problems with breathing, sleep apnea and reduced coughing, which is especially dangerous for patients with lung disease. Overdose or dangerous interactions with alcohol and other medications may occur, leading to death. Hyperalgesia may develop, which means patients receiving opioids for the treatment of pain may become more sensitive to certain painful stimuli, and in some cases, experience pain from ordinarily non-painful stimuli. Women between the ages of 14-53 who could become pregnant should carefully weigh the risks and benefits of opioids with their physicians, as these medications increase the risk of pregnancy complications, including miscarriage,  delivery and stillbirth. It is also possible for babies to be born addicted to opioids. Opioid dependence withdrawal symptoms may include; feelings of uneasiness, increased pain, irritability, belly pain, diarrhea, sweats and goose-flesh. Benzodiazepines and non-benzodiazepine sleep medications: These medications can lead to problems such as addiction/dependence, sedation, fatigue, lightheadedness, dizziness, incoordination, falls, depression, hallucinations, and impaired judgment, memory and concentration. The ability to drive and operate machinery may also be affected. Abnormal sleep-related behaviors have been reported, including sleepwalking, driving, making telephone calls, eating, or having sex while not fully awake. These medications can suppress breathing and worsen sleep apnea, particularly when combined with alcohol or other sedating medications, potentially leading to death. Dependence withdrawal symptoms may include tremors, anxiety, hallucinations and seizures. Stimulants:  Common adverse effects include addiction/dependence, increased blood  pressure and heart rate, decreased appetite, nausea, involuntary weight loss, insomnia,                                                                                                                     Initials:_______   irritability, and headaches.   These risks may increase when these medications are combined with other stimulants, such as caffeine pills or energy drinks, certain weight loss supplements and oral decongestants. Dependence withdrawal symptoms may include depressed mood, loss of interest, suicidal thoughts, anxiety, fatigue, appetite changes and agitation. Testosterone replacement therapy:  Potential side effects include increased risk of stroke and heart attack, blood clots, increased blood pressure, increased cholesterol, enlarged prostate, sleep apnea, irritability/aggression and other mood disorders, and decreased fertility. I agree and understand that I and my prescriber have the following rights and responsibilities regarding my treatment plan:     1. MY RIGHTS:  To be informed of my treatment and medication plan. To be an active participant in my health and wellbeing. 2. MY RESPONSIBILITY AND UNDERSTANDING FOR USE OF MEDICATIONS   I will take medications at the dose and frequency as directed. For my safety, I will not increase or change how I take my medications without the recommendation of my healthcare provider.  I will actively participate in any program recommended by my provider which may improve function, including social, physical, psychological programs.  I will not take my medications with alcohol or other drugs not prescribed to me. I understand that drinking alcohol with my medications increases the chances of side effects, including reduced breathing rate and could lead to personal injury when operating machinery.  I understand that if I have a history of substance use disorders, including alcohol or other illicit drugs, that I may be at increased risk of addiction to my medications.  I agree to notify my provider immediately if I should become pregnant so that my treatment plan can be adjusted.    I agree and understand that I shall only receive controlled substance medications from the prescriber that signed this agreement unless there is written agreement among other prescribers of controlled substances outlining the responsibility of the medications being prescribed.  I understand that the if the controlled medication is not helping to achieve goals, the dosage may be tapered and no longer prescribed. 3. MY RESPONSIBILITY FOR COMMUNICATION / PRESCRIPTION RENEWALS   I agree that all controlled substance medications that I take will be prescribed only by my provider. If another healthcare provider prescribes me medication in an emergency, I will notify my provider within seventy-two (72) hours.  I will arrange for refills at the prescribed interval ONLY during regular office hours. I will not ask for refills earlier than agreed, after-hours, on holidays or weekends. Refills may take up to 72 hours for processing and prescriptions to reach the pharmacy.  I will inform my other health care providers that I am taking these medications and of the existence of this [id number]. In the event of an emergency, I will provide the same information to the emergency department prescribers.  I will keep my provider updated on the pharmacy I am using for controlled medication prescription filling. Initials:_______  4. MY RESPONSIBILITY FOR PROTECTING MEDICATIONS   I will protect my prescriptions and medications. I understand that lost or misplaced prescriptions will not be replaced.  I will keep medications only for my own use and will not share them with others. I will keep all medications away from children.  I agree that if my medications are adjusted or discontinued, I will properly dispose of any remaining medications. I understand that I will be required to dispose of any remaining controlled medications as, directed by my prescriber, prior to being provided with any prescriptions for other controlled medications.   Medication drop box locations can be found at: [email or web address]    5. MY RESPONSIBILITY WITH ILLEGAL DRUGS    I will not use illegal or street drugs or another person's prescription medications not prescribed to me.  If there are identified addiction type symptoms, then referral to a program may be provided by my provider and I agree to follow through with this recommendation. 6. MY RESPONSIBILITY FOR COOPERATION WITH INVESTIGATIONS   I understand that my provider will comply with any applicable law and may discuss my use and/or possible misuse/abuse of controlled substances and alcohol, as appropriate, with any health care provider involved in my care, pharmacist, or legal authority.  I authorize my provider and pharmacy to cooperate fully with law enforcement agencies (as permitted by law) in the investigation of any possible misuse, sale, or other diversion of my controlled substances.  I agree to waive any applicable privilege or right of privacy or confidentiality with respect to these authorizations. 7. PROVIDERS RIGHT TO MONITOR FOR SAFETY: PRESCRIPTION MONITORING / DRUG TESTING   I consent to drug/toxicology screening and will submit to a drug screen upon my providers request to assure I am only taking the prescribed drugs for my safety monitoring. I understand that a drug screen is a laboratory test in which a sample of my urine, blood or saliva is checked to see what drugs I have been taking. This may entail an observed urine specimen, which means that a nurse or other health care provider may watch me provide urine, and I will cooperate if I am asked to provide an observed specimen.  I understand that my provider will check a copy of my State Prescription Monitoring Program () Report in order to safely prescribe medications.  Pill Counts: I consent to pill counts when requested.   I may be asked to bring all my prescribed controlled substance medications, in their original bottles, to all of my scheduled appointments. In addition, my provider may ask me to come to the practice at any time for a random pill count. 8. TERMINATION OF THIS AGREEMENT  For my safety, my prescriber has the right to stop prescribing controlled substance medications and may end this agreement. Initials:_______   Conditions that may result in termination of this agreement:  a. I do not show any improvement in pain, or my activity has not improved. b. I develop rapid tolerance or loss of improvement, as described in my treatment plan.  c. I develop significant side effects from the medication. d. My behavior is not consistent with the responsibilities outlined above, thereby causing safety concerns to continue prescribing controlled substance medications. e. I fail to follow the terms of this agreement. f. Other:____________________________       UNDERSTANDING THIS MEDICATION AGREEMENT:    I have read the above and have had all my questions answered. For chronic disease management, I know that my symptoms can be managed with many types of treatments. A chronic medication trial may be part of my treatment, but I must be an active participant in my care. Medication therapy is only one part of my symptom management plan. In some cases, there may be limited scientific evidence to support the chronic use of certain medications to improve symptoms and daily function. Furthermore, in certain circumstances, there may be scientific information that suggests that the use of chronic controlled substances may worsen my symptoms and increase my risk of unintentional death directly related to this medication therapy. I know that if my provider feels my risk from controlled medications is greater than my benefit, I will have my controlled substance medication(s) compassionately lowered or removed altogether.      I further agree to allow this office to contact my HIPAA contact if there are concerns about my safety and use of the controlled medications. I have agreed to use the prescribed controlled substance medications to me as instructed by my provider and as stated in this Medication Agreement. My initial on each page and my signature below shows that I have read each page and I have had the opportunity to ask questions with answers provided by my provider.     Patient Name (Printed): _____________________________________  Patient Signature:  ______________________   Date: _____________    Prescriber Name (Printed): ___________________________________  Prescriber Signature: _____________________  Date: _____________

## 2022-01-05 NOTE — PROGRESS NOTES
SUBJECTIVE:    Patient ID: Franky Munoz is a57 y.o. male. Franky Munoz is here today for Medication Refill (Patient presents for medication refill and will come back for lab work.)  . HPI:   HPI     Pt is here with his spouse today. He states he needs medications refilled. He does have past medical history of hypertension which is well controlled today. He does adhere to daily medication treatment plan. He also has history of hyperlipidemia and is adherent to statin therapy but denies being adherent to low-cholesterol diet. Patient also has GERD which is well controlled at this time. Previous lab has indicated impaired fasting glucose. He also has history of vitamin D insufficiency. Patient has been evaluated at [address] [place] [address] related to complex regional pain syndrome and has been released from there and turned back over to me for medication management with gabapentin. He has been very well controlled with this medication and has not shown any warning signs of abuse. He is on contract at this time and also needs refill while here today. Patient does not want to consider Shingrix vaccine but he is very hesitant in this despite research findings that it is safe even when he has no history of known chickenpox illness. He and I have discussed this and he would feel more comfortable if I were to do antibody testing. Past Medical History:   Diagnosis Date    Complex regional pain syndrome i of right lower limb 5/30/2018    GERD (gastroesophageal reflux disease)     Hyperlipidemia     Hypertension      Prior to Visit Medications    Medication Sig Taking? Authorizing Provider   potassium chloride (KLOR-CON M10) 10 MEQ extended release tablet TAKE 1 TABLET BY MOUTH EVERY DAY Yes GIOVANNY Garza   gabapentin (NEURONTIN) 300 MG capsule Take 3 capsules by mouth 3 times daily for 90 days.  Yes GIOVANNY Garza   atorvastatin (LIPITOR) 40 MG tablet TAKE 0.5 TABLET BY MOUTH EVERY DAY Yes Leonila GIOVANNY Guadalupe   amLODIPine (NORVASC) 5 MG tablet Take 1 tablet by mouth daily Yes GIOVANNY Garza   chlorthalidone (HYGROTON) 25 MG tablet TAKE ONE  TABLET BY MOUTH DAILY. Yes GIOVANNY Garza   olmesartan (BENICAR) 40 MG tablet Take 1 tablet by mouth daily Yes GIOVANNY Garza   Blood Pressure KIT Check bp 3x/wk   Dx-htn Yes GIOVANNY Garza   Multiple Vitamins-Minerals (CENTRUM SILVER 50+MEN) TABS Take by mouth Yes Historical Provider, MD   acetaminophen (TYLENOL 8 HOUR ARTHRITIS PAIN) 650 MG extended release tablet Take 1 tablet by mouth every 8 hours as needed for Pain  Patient taking differently: Take 1,300 mg by mouth three times daily  Yes GIOVANNY Garza   gabapentin (NEURONTIN) 300 MG capsule Take 3 capsules by mouth 3 times daily for 90 days. GIOVANNY Heller     Allergies   Allergen Reactions    Penicillins Hives     Other reaction(s):  Other (see comments)  Patient states he passed out the last time he took penicillin      Past Surgical History:   Procedure Laterality Date    FINGER SURGERY      OTHER SURGICAL HISTORY  2017    colonoscopy done at [facility] COLONOSCOPY W/BIOPSY SINGLE/MULTIPLE N/A 2017    Dr Kori Aguilar x 4--5 yr recall     Family History   Problem Relation Age of Onset    High Blood Pressure Mother     Cancer Mother      Social History     Socioeconomic History    Marital status:      Spouse name: Not on file    Number of children: Not on file    Years of education: Not on file    Highest education level: Not on file   Occupational History    Not on file   Tobacco Use    Smoking status: Former Smoker     Packs/day: 1.50     Years: 45.00     Pack years: 67.50     Types: Cigarettes     Quit date: 1973     Years since quittin.1    Smokeless tobacco: Never Used   Substance and Sexual Activity    Alcohol use: No    Drug use: No    Sexual activity: Yes     Partners: Female   Other Topics Concern    Not on file   Social History Narrative    Not on file     Social Determinants of Health     Financial Resource Strain: Low Risk     Difficulty of Paying Living Expenses: Not hard at all   Food Insecurity: No Food Insecurity    Worried About Running Out of Food in the Last Year: Never true    Brie of Food in the Last Year: Never true   Transportation Needs: No Transportation Needs    Lack of Transportation (Medical): No    Lack of Transportation (Non-Medical): No   Physical Activity:     Days of Exercise per Week: Not on file    Minutes of Exercise per Session: Not on file   Stress:     Feeling of Stress : Not on file   Social Connections:     Frequency of Communication with Friends and Family: Not on file    Frequency of Social Gatherings with Friends and Family: Not on file    Attends Roman [employer] Services: Not on file    Active Member of [address] [place] [address] [employer] or Organizations: Not on file    Attends Club or Organization Meetings: Not on file    Marital Status: Not on file   Intimate Partner Violence:     Fear of Current or Ex-Partner: Not on file    Emotionally Abused: Not on file    Physically Abused: Not on file    Sexually Abused: Not on file   Housing Stability:     Unable to Pay for Housing in the Last Year: Not on file    Number of [place] in the Last Year: Not on file    Unstable Housing in the Last Year: Not on file       Review of Systems   Constitutional: Negative. Respiratory: Negative. Cardiovascular: Negative. Gastrointestinal: Negative. Musculoskeletal: Positive for arthralgias. Neurological: Negative. OBJECTIVE:    Physical Exam  Vitals and nursing note reviewed. Constitutional:       General: He is not in acute distress. Appearance: Normal appearance. He is well-developed. He is obese. He is not ill-appearing, toxic-appearing or diaphoretic. HENT:      Head: Normocephalic and atraumatic. Eyes:      Extraocular Movements: Extraocular movements intact.       Conjunctiva/sclera: Conjunctivae normal. Pupils: Pupils are equal, round, and reactive to light. Neck:      Vascular: No carotid bruit. Trachea: No tracheal deviation. Cardiovascular:      Rate and Rhythm: Normal rate and regular rhythm. Heart sounds: Normal heart sounds. Pulmonary:      Effort: Pulmonary effort is normal.      Breath sounds: Normal breath sounds. Abdominal:      General: Bowel sounds are normal. There is no distension. Palpations: Abdomen is soft. Musculoskeletal:      Cervical back: Normal range of motion and neck supple. No rigidity. Right lower leg: No edema. Left lower leg: No edema. Skin:     General: Skin is warm and dry. Capillary Refill: Capillary refill takes less than 2 seconds. Neurological:      General: No focal deficit present. Mental Status: He is alert and oriented to person, place, and time. Mental status is at baseline. Psychiatric:         Mood and Affect: Mood normal. Mood is not anxious or depressed. Affect is not angry. Speech: Speech normal.         Behavior: Behavior normal.         Thought Content: Thought content normal.         Judgment: Judgment normal.         /86 (Site: Left Upper Arm, Position: Sitting, Cuff Size: Large Adult)   Pulse 88   Temp 97.9 °F (36.6 °C) (Temporal)   Resp 20   Ht 5' 6.25\" (1.683 m)   Wt 211 lb (95.7 kg)   SpO2 97%   BMI 33.80 kg/m²      ASSESSMENT:      ICD-10-CM    1. Mixed hyperlipidemia  E78.2 Comprehensive Metabolic Panel w/ Reflex to MG     Lipid Panel     TSH WITH REFLEX TO FT4   2. Flu vaccine need  Z23 INFLUENZA, MDCK QUADV, 2 YRS AND OLDER, IM, PF, PREFILL SYR OR SDV, 0.5ML (FLUCELVAX QUADV, PF)   3. Hypertension, unspecified type  I10 CBC Auto Differential     CANCELED: Urinalysis Reflex to Culture   4. Vitamin D insufficiency  E55.9 Vitamin D 25 Hydroxy   5. IFG (impaired fasting glucose)  R73.01 Hemoglobin A1C   6.  Screening for viral disease  Z11.59 Varicella-Zoster Virus (VZV) Antibodies IgG & IgM 7. Prostate cancer screening  Z12.5 PSA screening   8. Complex regional pain syndrome i of right lower limb  G90.521 POCT Rapid Drug Screen     gabapentin (NEURONTIN) 300 MG capsule   9. Hypokalemia  E87.6 potassium chloride (KLOR-CON M10) 10 MEQ extended release tablet       PLAN:    [place]: Medication Refill (Patient presents for medication refill and will come back for lab work.)  DOMINGO  Update contract  POCT UDS  Lab fasting    Diagnosis and orders for this visit are above. Please note that this chart was generated using dragon dictationsoftware. Although every effort was made to ensure the accuracy of this automated transcription, some errors in transcription may have occurred.

## 2022-01-06 DIAGNOSIS — E55.9 VITAMIN D INSUFFICIENCY: ICD-10-CM

## 2022-01-06 DIAGNOSIS — Z11.59 SCREENING FOR VIRAL DISEASE: ICD-10-CM

## 2022-01-06 DIAGNOSIS — R73.01 IFG (IMPAIRED FASTING GLUCOSE): ICD-10-CM

## 2022-01-06 DIAGNOSIS — Z12.5 PROSTATE CANCER SCREENING: ICD-10-CM

## 2022-01-06 DIAGNOSIS — E78.2 MIXED HYPERLIPIDEMIA: ICD-10-CM

## 2022-01-06 DIAGNOSIS — I10 HYPERTENSION, UNSPECIFIED TYPE: ICD-10-CM

## 2022-01-06 LAB
ALBUMIN SERPL-MCNC: 4.7 G/DL (ref 3.5–5.2)
ALP BLD-CCNC: 109 U/L (ref 40–130)
ALT SERPL-CCNC: 19 U/L (ref 5–41)
ANION GAP SERPL CALCULATED.3IONS-SCNC: 10 MMOL/L (ref 7–19)
AST SERPL-CCNC: 16 U/L (ref 5–40)
BASOPHILS ABSOLUTE: 0.1 K/UL (ref 0–0.2)
BASOPHILS RELATIVE PERCENT: 1 % (ref 0–1)
BILIRUB SERPL-MCNC: 0.3 MG/DL (ref 0.2–1.2)
BUN BLDV-MCNC: 17 MG/DL (ref 8–23)
CALCIUM SERPL-MCNC: 10.2 MG/DL (ref 8.8–10.2)
CHLORIDE BLD-SCNC: 103 MMOL/L (ref 98–111)
CHOLESTEROL, TOTAL: 160 MG/DL (ref 160–199)
CO2: 31 MMOL/L (ref 22–29)
CREAT SERPL-MCNC: 0.9 MG/DL (ref 0.5–1.2)
EOSINOPHILS ABSOLUTE: 0.5 K/UL (ref 0–0.6)
EOSINOPHILS RELATIVE PERCENT: 4.6 % (ref 0–5)
GFR AFRICAN AMERICAN: >59
GFR NON-AFRICAN AMERICAN: >60
GLUCOSE BLD-MCNC: 99 MG/DL (ref 74–109)
HBA1C MFR BLD: 6.3 % (ref 4–6)
HCT VFR BLD CALC: 47.7 % (ref 42–52)
HDLC SERPL-MCNC: 40 MG/DL (ref 55–121)
HEMOGLOBIN: 15.5 G/DL (ref 14–18)
IMMATURE GRANULOCYTES #: 0 K/UL
LDL CHOLESTEROL CALCULATED: 102 MG/DL
LYMPHOCYTES ABSOLUTE: 3 K/UL (ref 1.1–4.5)
LYMPHOCYTES RELATIVE PERCENT: 28.6 % (ref 20–40)
MCH RBC QN AUTO: 31.4 PG (ref 27–31)
MCHC RBC AUTO-ENTMCNC: 32.5 G/DL (ref 33–37)
MCV RBC AUTO: 96.6 FL (ref 80–94)
MONOCYTES ABSOLUTE: 1 K/UL (ref 0–0.9)
MONOCYTES RELATIVE PERCENT: 9.1 % (ref 0–10)
NEUTROPHILS ABSOLUTE: 5.9 K/UL (ref 1.5–7.5)
NEUTROPHILS RELATIVE PERCENT: 56.3 % (ref 50–65)
PDW BLD-RTO: 13.1 % (ref 11.5–14.5)
PLATELET # BLD: 214 K/UL (ref 130–400)
PMV BLD AUTO: 11 FL (ref 9.4–12.4)
POTASSIUM REFLEX MAGNESIUM: 3.6 MMOL/L (ref 3.5–5)
PROSTATE SPECIFIC ANTIGEN: 0.8 NG/ML (ref 0–4)
RBC # BLD: 4.94 M/UL (ref 4.7–6.1)
SODIUM BLD-SCNC: 144 MMOL/L (ref 136–145)
TOTAL PROTEIN: 6.8 G/DL (ref 6.6–8.7)
TRIGL SERPL-MCNC: 89 MG/DL (ref 0–149)
TSH REFLEX FT4: 1.08 UIU/ML (ref 0.35–5.5)
VITAMIN D 25-HYDROXY: 37.6 NG/ML
WBC # BLD: 10.5 K/UL (ref 4.8–10.8)

## 2022-01-11 LAB
VARICELLA ZOSTER AB IGM: 0.12 ISR
VZV IGG SER QL IA: 1237 IV

## 2022-01-24 ASSESSMENT — ENCOUNTER SYMPTOMS
GASTROINTESTINAL NEGATIVE: 1
RESPIRATORY NEGATIVE: 1

## 2022-05-05 DIAGNOSIS — G90.521 COMPLEX REGIONAL PAIN SYNDROME I OF RIGHT LOWER LIMB: ICD-10-CM

## 2022-05-05 NOTE — TELEPHONE ENCOUNTER
OV - 01/05/2022    DOMINGO was reviewed today per office protocol. Report shows No discrepancies. Fill pattern is consistent from single provider(s) at single pharmacy(s).

## 2022-05-06 RX ORDER — GABAPENTIN 300 MG/1
900 CAPSULE ORAL 3 TIMES DAILY
Qty: 810 CAPSULE | Refills: 0 | Status: SHIPPED | OUTPATIENT
Start: 2022-05-06 | End: 2022-08-10 | Stop reason: SDUPTHER

## 2022-08-01 ENCOUNTER — OFFICE VISIT (OUTPATIENT)
Dept: FAMILY MEDICINE CLINIC | Age: 62
End: 2022-08-01
Payer: COMMERCIAL

## 2022-08-01 VITALS
TEMPERATURE: 97.8 F | BODY MASS INDEX: 33.91 KG/M2 | DIASTOLIC BLOOD PRESSURE: 64 MMHG | SYSTOLIC BLOOD PRESSURE: 110 MMHG | OXYGEN SATURATION: 98 % | RESPIRATION RATE: 20 BRPM | HEART RATE: 78 BPM | HEIGHT: 66 IN | WEIGHT: 211 LBS

## 2022-08-01 DIAGNOSIS — G90.521 COMPLEX REGIONAL PAIN SYNDROME I OF RIGHT LOWER LIMB: ICD-10-CM

## 2022-08-01 DIAGNOSIS — R06.81 WITNESSED APNEIC SPELLS: ICD-10-CM

## 2022-08-01 DIAGNOSIS — E78.2 MIXED HYPERLIPIDEMIA: ICD-10-CM

## 2022-08-01 DIAGNOSIS — R73.01 IFG (IMPAIRED FASTING GLUCOSE): ICD-10-CM

## 2022-08-01 DIAGNOSIS — I10 ESSENTIAL HYPERTENSION: ICD-10-CM

## 2022-08-01 DIAGNOSIS — Z12.11 SCREEN FOR COLON CANCER: ICD-10-CM

## 2022-08-01 DIAGNOSIS — R06.83 SNORING: Primary | ICD-10-CM

## 2022-08-01 LAB
ALBUMIN SERPL-MCNC: 4.1 G/DL (ref 3.5–5.2)
ALP BLD-CCNC: 102 U/L (ref 40–130)
ALT SERPL-CCNC: 23 U/L (ref 5–41)
ANION GAP SERPL CALCULATED.3IONS-SCNC: 9 MMOL/L (ref 7–19)
AST SERPL-CCNC: 20 U/L (ref 5–40)
BASOPHILS ABSOLUTE: 0.1 K/UL (ref 0–0.2)
BASOPHILS RELATIVE PERCENT: 1.3 % (ref 0–1)
BILIRUB SERPL-MCNC: 0.3 MG/DL (ref 0.2–1.2)
BILIRUBIN URINE: NEGATIVE
BLOOD, URINE: NEGATIVE
BUN BLDV-MCNC: 15 MG/DL (ref 8–23)
CALCIUM SERPL-MCNC: 9.9 MG/DL (ref 8.8–10.2)
CHLORIDE BLD-SCNC: 104 MMOL/L (ref 98–111)
CHOLESTEROL, TOTAL: 125 MG/DL (ref 160–199)
CLARITY: CLEAR
CO2: 29 MMOL/L (ref 22–29)
COLOR: ABNORMAL
CREAT SERPL-MCNC: 0.8 MG/DL (ref 0.5–1.2)
EOSINOPHILS ABSOLUTE: 0.4 K/UL (ref 0–0.6)
EOSINOPHILS RELATIVE PERCENT: 5.1 % (ref 0–5)
GFR AFRICAN AMERICAN: >59
GFR NON-AFRICAN AMERICAN: >60
GLUCOSE BLD-MCNC: 113 MG/DL (ref 74–109)
GLUCOSE URINE: NEGATIVE MG/DL
HBA1C MFR BLD: 6.4 % (ref 4–6)
HCT VFR BLD CALC: 43.6 % (ref 42–52)
HDLC SERPL-MCNC: 37 MG/DL (ref 55–121)
HEMOGLOBIN: 14.9 G/DL (ref 14–18)
IMMATURE GRANULOCYTES #: 0 K/UL
KETONES, URINE: NEGATIVE MG/DL
LDL CHOLESTEROL CALCULATED: 76 MG/DL
LEUKOCYTE ESTERASE, URINE: NEGATIVE
LYMPHOCYTES ABSOLUTE: 2.9 K/UL (ref 1.1–4.5)
LYMPHOCYTES RELATIVE PERCENT: 34.3 % (ref 20–40)
MCH RBC QN AUTO: 32.3 PG (ref 27–31)
MCHC RBC AUTO-ENTMCNC: 34.2 G/DL (ref 33–37)
MCV RBC AUTO: 94.6 FL (ref 80–94)
MONOCYTES ABSOLUTE: 0.7 K/UL (ref 0–0.9)
MONOCYTES RELATIVE PERCENT: 8.4 % (ref 0–10)
NEUTROPHILS ABSOLUTE: 4.2 K/UL (ref 1.5–7.5)
NEUTROPHILS RELATIVE PERCENT: 50.5 % (ref 50–65)
NITRITE, URINE: NEGATIVE
PDW BLD-RTO: 13.1 % (ref 11.5–14.5)
PH UA: 7.5 (ref 5–8)
PLATELET # BLD: 191 K/UL (ref 130–400)
PMV BLD AUTO: 11.6 FL (ref 9.4–12.4)
POTASSIUM REFLEX MAGNESIUM: 3.7 MMOL/L (ref 3.5–5)
PROTEIN UA: ABNORMAL MG/DL
RBC # BLD: 4.61 M/UL (ref 4.7–6.1)
SODIUM BLD-SCNC: 142 MMOL/L (ref 136–145)
SPECIFIC GRAVITY UA: 1.02 (ref 1–1.03)
TOTAL PROTEIN: 6.7 G/DL (ref 6.6–8.7)
TRIGL SERPL-MCNC: 59 MG/DL (ref 0–149)
TSH REFLEX FT4: 0.83 UIU/ML (ref 0.35–5.5)
UROBILINOGEN, URINE: 1 E.U./DL
WBC # BLD: 8.4 K/UL (ref 4.8–10.8)

## 2022-08-01 PROCEDURE — 99214 OFFICE O/P EST MOD 30 MIN: CPT | Performed by: NURSE PRACTITIONER

## 2022-08-01 RX ORDER — GABAPENTIN 300 MG/1
900 CAPSULE ORAL 3 TIMES DAILY
Qty: 540 CAPSULE | Refills: 0 | Status: SHIPPED | OUTPATIENT
Start: 2022-08-01 | End: 2022-08-10 | Stop reason: CLARIF

## 2022-08-01 RX ORDER — AMLODIPINE BESYLATE 2.5 MG/1
2.5 TABLET ORAL DAILY
Qty: 90 TABLET | Refills: 1 | Status: SHIPPED | OUTPATIENT
Start: 2022-08-01 | End: 2022-10-26

## 2022-08-01 RX ORDER — ATORVASTATIN CALCIUM 40 MG/1
TABLET, FILM COATED ORAL
Qty: 45 TABLET | Refills: 7 | OUTPATIENT
Start: 2022-08-01

## 2022-08-01 RX ORDER — OLMESARTAN MEDOXOMIL 40 MG/1
TABLET ORAL
Qty: 90 TABLET | Refills: 3 | OUTPATIENT
Start: 2022-08-01

## 2022-08-01 RX ORDER — ATORVASTATIN CALCIUM 40 MG/1
TABLET, FILM COATED ORAL
Qty: 90 TABLET | Refills: 3 | Status: SHIPPED | OUTPATIENT
Start: 2022-08-01

## 2022-08-01 RX ORDER — AMLODIPINE BESYLATE 5 MG/1
TABLET ORAL
Qty: 90 TABLET | Refills: 3 | OUTPATIENT
Start: 2022-08-01

## 2022-08-01 RX ORDER — CHLORTHALIDONE 25 MG/1
TABLET ORAL
Qty: 90 TABLET | Refills: 3 | Status: SHIPPED | OUTPATIENT
Start: 2022-08-01

## 2022-08-01 RX ORDER — CHLORTHALIDONE 25 MG/1
TABLET ORAL
Qty: 90 TABLET | Refills: 3 | OUTPATIENT
Start: 2022-08-01

## 2022-08-01 RX ORDER — OLMESARTAN MEDOXOMIL 40 MG/1
40 TABLET ORAL DAILY
Qty: 90 TABLET | Refills: 3 | Status: SHIPPED | OUTPATIENT
Start: 2022-08-01 | End: 2023-08-01

## 2022-08-01 SDOH — ECONOMIC STABILITY: FOOD INSECURITY: WITHIN THE PAST 12 MONTHS, YOU WORRIED THAT YOUR FOOD WOULD RUN OUT BEFORE YOU GOT MONEY TO BUY MORE.: NEVER TRUE

## 2022-08-01 SDOH — ECONOMIC STABILITY: FOOD INSECURITY: WITHIN THE PAST 12 MONTHS, THE FOOD YOU BOUGHT JUST DIDN'T LAST AND YOU DIDN'T HAVE MONEY TO GET MORE.: NEVER TRUE

## 2022-08-01 ASSESSMENT — PATIENT HEALTH QUESTIONNAIRE - PHQ9
2. FEELING DOWN, DEPRESSED OR HOPELESS: 0
SUM OF ALL RESPONSES TO PHQ QUESTIONS 1-9: 0
1. LITTLE INTEREST OR PLEASURE IN DOING THINGS: 0
SUM OF ALL RESPONSES TO PHQ QUESTIONS 1-9: 0
SUM OF ALL RESPONSES TO PHQ9 QUESTIONS 1 & 2: 0

## 2022-08-01 ASSESSMENT — ENCOUNTER SYMPTOMS: RESPIRATORY NEGATIVE: 1

## 2022-08-01 ASSESSMENT — SOCIAL DETERMINANTS OF HEALTH (SDOH): HOW HARD IS IT FOR YOU TO PAY FOR THE VERY BASICS LIKE FOOD, HOUSING, MEDICAL CARE, AND HEATING?: NOT HARD AT ALL

## 2022-08-01 NOTE — PROGRESS NOTES
SUBJECTIVE:    Patient ID: Aleida Whaley is a57 y.o. male. Aleida Whaley is here today for Medication Refill (Patient presents for medication refills and labs for recheck on A1c. Patient is fasting today. /[facility]-[id number] Contract 01/2022) and Discuss Labs (Discuss abnormal carbon dioxide level on labs from January. Patient wants to have an overnight pulse ox done at home)  . HPI:   HPI   Patient is here today to follow-up on routine health care. He is fasting for any needed lab. Pt states that his employer nurse has noted co2 levels and concerned. Wife has been concerned pt may have stopped breathing. In review of last labs CO2 was at 32 had previously been 30 and then prior to that in the upper 20s. Patient is a smoker and is aware that this is potential reason, but with his spouse having witnessed what sounds to be an apneic episode, and with his snoring, sleep study is needed and he is in agreement. Pretension-blood pressure is on the lower end today and patient does state that he certainly does not have any \"get up and go\". He is adherent to olmesartan 40 mg, amlodipine 5 mg daily, and chlorthalidone 25 mg daily. He and I have discussed potential of decreasing medication, which I would like to be able to remove the medication, however he does hold some fluid in his lower legs and I am afraid removing chlorthalidone is not the answer. 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 Dietary changes he does state that when he found out his A1c had increased previously he made significant changes but is count of fallen off the wagon on that now but is no longer eating little Suys cakes all the time as he states he was doing previously. Complex regional pain syndrome right lower limb  Has been eval by Fernando. Gabapentin is helpful and he needs refills.         Past Medical History: Diagnosis Date    Complex regional pain syndrome i of right lower limb 5/30/2018    GERD (gastroesophageal reflux disease)     Hyperlipidemia     Hypertension      Prior to Visit Medications    Medication Sig Taking? Authorizing Provider   gabapentin (NEURONTIN) 300 MG capsule Take 3 capsules by mouth in the morning and 3 capsules at noon and 3 capsules before bedtime. Do all this for 90 days. Yes GIOVANNY Garza   chlorthalidone (HYGROTON) 25 MG tablet TAKE 1 TABLET BY MOUTH DAILY. Yes GIOVANNY Garza   olmesartan (BENICAR) 40 MG tablet Take 1 tablet by mouth in the morning. Yes GIOVANNY Garza   amLODIPine (NORVASC) 2.5 MG tablet Take 1 tablet by mouth in the morning. Yes GIOVANNY Garza   atorvastatin (LIPITOR) 40 MG tablet TAKE 0.5 TABLET BY MOUTH EVERY DAY Yes GIOVANNY Garza   gabapentin (NEURONTIN) 300 MG capsule TAKE 3 CAPSULES BY MOUTH 3 TIMES DAILY FOR 90 DAYS. Yes GIOVANNY Garza   potassium chloride (KLOR-CON M10) 10 MEQ extended release tablet TAKE 1 TABLET BY MOUTH EVERY DAY Yes GIOVANNY Garza   Blood Pressure KIT Check bp 3x/wk   Dx-htn Yes GIOVANNY Garza   Multiple Vitamins-Minerals (CENTRUM SILVER 50+MEN) TABS Take by mouth Yes Historical Provider, MD   acetaminophen (TYLENOL 8 HOUR ARTHRITIS PAIN) 650 MG extended release tablet Take 1 tablet by mouth every 8 hours as needed for Pain  Patient taking differently: Take 1,300 mg by mouth in the morning and 1,300 mg at noon and 1,300 mg in the evening. Yes GIOVANNY Garza     Allergies   Allergen Reactions    Penicillins Hives     Other reaction(s):  Other (see comments)  Patient states he passed out the last time he took penicillin      Past Surgical History:   Procedure Laterality Date    FINGER SURGERY      OTHER SURGICAL HISTORY  08/2017    colonoscopy done at [facility] COLONOSCOPY W/BIOPSY SINGLE/MULTIPLE N/A 6/19/2017    Dr Félix Young x 4--5 yr recall     Family History   Problem Relation Age of Onset    High Blood Pressure Mother Cancer Mother      Social History     Socioeconomic History    Marital status:      Spouse name: Not on file    Number of children: Not on file    Years of education: Not on file    Highest education level: Not on file   Occupational History    Not on file   Tobacco Use    Smoking status: Former     Packs/day: 1.50     Years: 45.00     Pack years: 67.50     Types: Cigarettes     Quit date: 1973     Years since quittin.7    Smokeless tobacco: Never   Substance and Sexual Activity    Alcohol use: No    Drug use: No    Sexual activity: Yes     Partners: Female   Other Topics Concern    Not on file   Social History Narrative    Not on file     Social Determinants of Health     Financial Resource Strain: Low Risk     Difficulty of Paying Living Expenses: Not hard at all   Food Insecurity: No Food Insecurity    Worried About Running Out of Food in the Last Year: Never true    Ran Out of Food in the Last Year: Never true   Transportation Needs: Not on file   Physical Activity: Not on file   Stress: Not on file   Social Connections: Not on file   Intimate Partner Violence: Not on file   Housing Stability: Not on file       Review of Systems   Constitutional: Negative. Respiratory: Negative. Cardiovascular: Negative. Musculoskeletal:         Left leg pain   Psychiatric/Behavioral:  Positive for sleep disturbance (snoring). OBJECTIVE:    Physical Exam  Vitals and nursing note reviewed. Constitutional:       General: He is not in acute distress. Appearance: Normal appearance. He is well-developed. He is obese. He is not ill-appearing, toxic-appearing or diaphoretic. HENT:      Head: Normocephalic and atraumatic. Eyes:      Extraocular Movements: Extraocular movements intact. Conjunctiva/sclera: Conjunctivae normal.      Pupils: Pupils are equal, round, and reactive to light. Neck:      Vascular: No carotid bruit. Trachea: No tracheal deviation.    Cardiovascular: Rate and Rhythm: Normal rate and regular rhythm. Heart sounds: Normal heart sounds. No murmur heard. Pulmonary:      Effort: Pulmonary effort is normal.      Breath sounds: Normal breath sounds. Abdominal:      General: Bowel sounds are normal. There is no distension. Palpations: Abdomen is soft. Musculoskeletal:      Cervical back: Normal range of motion and neck supple. No rigidity. Right lower leg: Edema (trace) present. Left lower leg: Edema (1+) present. Lymphadenopathy:      Cervical: No cervical adenopathy. Skin:     General: Skin is warm and dry. Capillary Refill: Capillary refill takes less than 2 seconds. Neurological:      General: No focal deficit present. Mental Status: He is alert and oriented to person, place, and time. Mental status is at baseline. Psychiatric:         Mood and Affect: Mood normal. Mood is not anxious or depressed. Affect is not angry. Speech: Speech normal.         Behavior: Behavior normal.         Thought Content: Thought content normal.         Judgment: Judgment normal.       BP (!) 112/58 (Site: Left Upper Arm, Position: Sitting, Cuff Size: Large Adult)   Pulse 78   Temp 97.8 °F (36.6 °C) (Temporal)   Resp 20   Ht 5' 6\" (1.676 m)   Wt 211 lb (95.7 kg)   SpO2 98%   BMI 34.06 kg/m²      ASSESSMENT:      ICD-10-CM    1. Snoring  R06.83 Baseline Diagnostic Sleep Study      2. Complex regional pain syndrome i of right lower limb  G90.521 gabapentin (NEURONTIN) 300 MG capsule      3. Essential hypertension  I10 chlorthalidone (HYGROTON) 25 MG tablet     olmesartan (BENICAR) 40 MG tablet     amLODIPine (NORVASC) 2.5 MG tablet     Urinalysis with Reflex to Culture     Lipid Panel      4. Mixed hyperlipidemia  E78.2 atorvastatin (LIPITOR) 40 MG tablet     CBC with Auto Differential     Comprehensive Metabolic Panel w/ Reflex to MG     Lipid Panel     TSH with Reflex to FT4      5.  Witnessed apneic spells  R06.81 Baseline Diagnostic Sleep Study      6. Screen for colon cancer  Z12.11 Ramos Orosco MD, Gastroenterology, [place]      7. IFG (impaired fasting glucose)  R73.01 Hemoglobin A1C          PLAN:    Sean Said: Medication Refill (Patient presents for medication refills and labs for recheck on A1c. Patient is fasting today. /[facility]-07/[id number] Contract 01/2022) and Discuss Labs (Discuss abnormal carbon dioxide level on labs from January. Patient wants to have an overnight pulse ox done at home)  Recheck bp  Lab  Dr. Luke LANE  F/u pending lab review and prn. Diagnosis and orders for this visit are above. Please note that this chart was generated using dragon dictationsoftware. Although every effort was made to ensure the accuracy of this automated transcription, some errors in transcription may have occurred.

## 2022-08-10 ENCOUNTER — PATIENT MESSAGE (OUTPATIENT)
Dept: FAMILY MEDICINE CLINIC | Age: 62
End: 2022-08-10

## 2022-08-10 DIAGNOSIS — G90.521 COMPLEX REGIONAL PAIN SYNDROME I OF RIGHT LOWER LIMB: ICD-10-CM

## 2022-08-10 RX ORDER — GABAPENTIN 300 MG/1
900 CAPSULE ORAL 3 TIMES DAILY
Qty: 810 CAPSULE | Refills: 0 | Status: SHIPPED | OUTPATIENT
Start: 2022-08-10 | End: 2022-11-08

## 2022-08-10 NOTE — TELEPHONE ENCOUNTER
From: Juma Saleh  To:  Leonila Guadalupe  Sent: 8/10/2022 3:14 PM CDT  Subject: Nurrotin    I usually get a 90 day supply of gapapitin you only gave me a 60 dot supply Ana picked up the 60 day supply can we get the other 30 day supply thank you

## 2022-10-23 DIAGNOSIS — I10 ESSENTIAL HYPERTENSION: ICD-10-CM

## 2022-10-24 ENCOUNTER — TELEPHONE (OUTPATIENT)
Dept: FAMILY MEDICINE CLINIC | Age: 62
End: 2022-10-24

## 2022-10-24 DIAGNOSIS — I10 ESSENTIAL HYPERTENSION: ICD-10-CM

## 2022-10-24 RX ORDER — AMLODIPINE BESYLATE 2.5 MG/1
2.5 TABLET ORAL DAILY
Qty: 90 TABLET | Refills: 1 | Status: CANCELLED | OUTPATIENT
Start: 2022-10-24

## 2022-10-24 NOTE — TELEPHONE ENCOUNTER
Ruby Severin called to request a refill on his medication.       Last office visit : 8/1/2022   Next office visit : Visit date not found     Requested Prescriptions     Pending Prescriptions Disp Refills    amLODIPine (NORVASC) 2.5 MG tablet 90 tablet 1     Sig: Take 1 tablet by mouth daily            Alanna Carrillo CMA

## 2022-10-24 NOTE — TELEPHONE ENCOUNTER
Ruby Severin called to request a refill on his medication.       Last office visit : 8/1/2022   Next office visit : 10/24/2022     Requested Prescriptions     Pending Prescriptions Disp Refills    amLODIPine (NORVASC) 5 MG tablet [Pharmacy Med Name: AMLODIPINE BESYLATE 5 MG TAB] 90 tablet 3     Sig: TAKE 1 TABLET BY MOUTH EVERY DAY            Regi Juarez

## 2022-10-26 RX ORDER — AMLODIPINE BESYLATE 5 MG/1
TABLET ORAL
Qty: 90 TABLET | Refills: 3 | Status: SHIPPED | OUTPATIENT
Start: 2022-10-26

## 2022-10-27 NOTE — TELEPHONE ENCOUNTER
He states that at the last office visit the amlodipine was decreased down to 2.5 mg he has already picked up the 5 mg and he goes back on the boat tomorrow. Can he just break the tablet in half and continue to take 2.5 mg? When he checks his BP on the boat most days bp is stable with the 2.5 mg. He can start recording his bp readings and let us know in 2 weeks.

## 2022-10-28 NOTE — TELEPHONE ENCOUNTER
Yes.  I had both dosages come to me and canceled the 2.5mg since the 5mg was also requested and knew the 5mg could be broken.

## 2022-12-30 DIAGNOSIS — Z12.11 SCREEN FOR COLON CANCER: Primary | ICD-10-CM

## 2023-01-11 DIAGNOSIS — I10 ESSENTIAL HYPERTENSION: ICD-10-CM

## 2023-01-11 RX ORDER — AMLODIPINE BESYLATE 2.5 MG/1
TABLET ORAL
Qty: 90 TABLET | Refills: 1 | Status: SHIPPED | OUTPATIENT
Start: 2023-01-11

## 2023-01-11 NOTE — TELEPHONE ENCOUNTER
Merel Armenta called to request a refill on his medication.       Last office visit : 8/1/2022   Next office visit : Visit date not found     Requested Prescriptions     Pending Prescriptions Disp Refills    amLODIPine (NORVASC) 2.5 MG tablet [Pharmacy Med Name: AMLODIPINE BESYLATE 2.5 MG TAB] 90 tablet 1     Sig: TAKE 1 TABLET BY MOUTH EVERY DAY IN THE MORNING            Zaire Graves, [address] [place] [address]

## 2023-01-19 DIAGNOSIS — G90.521 COMPLEX REGIONAL PAIN SYNDROME I OF RIGHT LOWER LIMB: ICD-10-CM

## 2023-01-20 RX ORDER — GABAPENTIN 300 MG/1
900 CAPSULE ORAL 3 TIMES DAILY
Qty: 810 CAPSULE | Refills: 0 | Status: CANCELLED | OUTPATIENT
Start: 2023-01-20 | End: 2023-04-20

## 2023-01-20 RX ORDER — GABAPENTIN 300 MG/1
900 CAPSULE ORAL 3 TIMES DAILY
Qty: 810 CAPSULE | Refills: 0 | OUTPATIENT
Start: 2023-01-20 | End: 2023-04-20

## 2023-01-20 NOTE — TELEPHONE ENCOUNTER
Received fax from pharmacy requesting refill on pts medication(s). Pt was last seen in office on 8/1/2022  and has a follow up scheduled for 1/19/2023. Will send request to   [facility]   for authorization. Requested Prescriptions     Pending Prescriptions Disp Refills    gabapentin (NEURONTIN) 300 MG capsule 810 capsule 0     Sig: Take 3 capsules by mouth 3 times daily for 90 days. Arelis Cook attached.

## 2023-01-20 NOTE — TELEPHONE ENCOUNTER
Will hold on refill until his visit.---Let me know if needs a supply to last until a ?rescheduled appt as I now see he had been scheduled for this week but I did not see him.

## 2023-01-23 ENCOUNTER — PATIENT MESSAGE (OUTPATIENT)
Dept: FAMILY MEDICINE CLINIC | Age: 63
End: 2023-01-23

## 2023-01-23 DIAGNOSIS — G90.521 COMPLEX REGIONAL PAIN SYNDROME I OF RIGHT LOWER LIMB: ICD-10-CM

## 2023-01-25 RX ORDER — GABAPENTIN 300 MG/1
900 CAPSULE ORAL 3 TIMES DAILY
Qty: 270 CAPSULE | Refills: 0 | Status: SHIPPED | OUTPATIENT
Start: 2023-01-25 | End: 2023-02-24

## 2023-01-25 NOTE — TELEPHONE ENCOUNTER
He is requesting a 30 day supply. Lev Matamoros called to request a refill on his medication. Last office visit : 8/1/2022   Next office visit : Patient will schedule for February when [place] the boat. Last UDS:   Amphetamine Screen, Urine   Date Value Ref Range Status   01/05/2022 negative  Final     Barbiturate Screen, Urine   Date Value Ref Range Status   01/05/2022 negative  Final     Benzodiazepine Screen, Urine   Date Value Ref Range Status   01/05/2022 negative  Final     Buprenorphine Urine   Date Value Ref Range Status   01/05/2022 negative  Final     Cocaine Metabolite Screen, Urine   Date Value Ref Range Status   01/05/2022 negative  Final     Gabapentin Screen, Urine   Date Value Ref Range Status   01/05/2022 not tested  Final     MDMA, Urine   Date Value Ref Range Status   01/05/2022 negative  Final     Methamphetamine, Urine   Date Value Ref Range Status   01/05/2022 negative  Final     Opiate Scrn, Ur   Date Value Ref Range Status   01/05/2022 negative  Final     Oxycodone Screen, Ur   Date Value Ref Range Status   01/05/2022 negative  Final     PCP Screen, Urine   Date Value Ref Range Status   01/05/2022 negative  Final     Propoxyphene Screen, Urine   Date Value Ref Range Status   01/05/2022 not tested  Final     THC Screen, Urine   Date Value Ref Range Status   01/05/2022 negative  Final     Tricyclic Antidepressants, Urine   Date Value Ref Range Status   01/05/2022 not tested  Final       Last Douglas Moh: 01/19/2023  Medication Contract: 01/27/2022 - NEEDS UPDATE   Last Fill: 10/10/2022    Requested Prescriptions     Pending Prescriptions Disp Refills    gabapentin (NEURONTIN) 300 MG capsule  0     Sig: Take 3 capsules by mouth 3 times daily for 30 days. Max Daily Amount: 2,700 mg         Please approve or refuse this medication.    Jessica Álvarez

## 2023-03-01 ENCOUNTER — OFFICE VISIT (OUTPATIENT)
Dept: FAMILY MEDICINE CLINIC | Age: 63
End: 2023-03-01

## 2023-03-01 VITALS
WEIGHT: 217 LBS | SYSTOLIC BLOOD PRESSURE: 130 MMHG | HEIGHT: 66 IN | BODY MASS INDEX: 34.87 KG/M2 | OXYGEN SATURATION: 98 % | HEART RATE: 108 BPM | TEMPERATURE: 97.7 F | RESPIRATION RATE: 20 BRPM | DIASTOLIC BLOOD PRESSURE: 70 MMHG

## 2023-03-01 DIAGNOSIS — I10 ESSENTIAL HYPERTENSION: Primary | ICD-10-CM

## 2023-03-01 DIAGNOSIS — Z79.899 MEDICATION MANAGEMENT: ICD-10-CM

## 2023-03-01 DIAGNOSIS — E55.9 VITAMIN D INSUFFICIENCY: ICD-10-CM

## 2023-03-01 DIAGNOSIS — G90.521 COMPLEX REGIONAL PAIN SYNDROME I OF RIGHT LOWER LIMB: ICD-10-CM

## 2023-03-01 DIAGNOSIS — E78.2 MIXED HYPERLIPIDEMIA: ICD-10-CM

## 2023-03-01 DIAGNOSIS — H61.22 IMPACTED CERUMEN, LEFT EAR: ICD-10-CM

## 2023-03-01 DIAGNOSIS — Z12.5 PROSTATE CANCER SCREENING: ICD-10-CM

## 2023-03-01 DIAGNOSIS — M17.0 OSTEOARTHRITIS OF BOTH KNEES, UNSPECIFIED OSTEOARTHRITIS TYPE: ICD-10-CM

## 2023-03-01 DIAGNOSIS — R73.01 IFG (IMPAIRED FASTING GLUCOSE): ICD-10-CM

## 2023-03-01 RX ORDER — GABAPENTIN 300 MG/1
900 CAPSULE ORAL 3 TIMES DAILY
Qty: 810 CAPSULE | Refills: 0 | Status: SHIPPED | OUTPATIENT
Start: 2023-03-01 | End: 2023-03-31

## 2023-03-01 ASSESSMENT — PATIENT HEALTH QUESTIONNAIRE - PHQ9
SUM OF ALL RESPONSES TO PHQ QUESTIONS 1-9: 0
1. LITTLE INTEREST OR PLEASURE IN DOING THINGS: 0
SUM OF ALL RESPONSES TO PHQ9 QUESTIONS 1 & 2: 0
SUM OF ALL RESPONSES TO PHQ QUESTIONS 1-9: 0
2. FEELING DOWN, DEPRESSED OR HOPELESS: 0

## 2023-03-01 ASSESSMENT — ENCOUNTER SYMPTOMS
RESPIRATORY NEGATIVE: 1
GASTROINTESTINAL NEGATIVE: 1
TROUBLE SWALLOWING: 0

## 2023-03-01 NOTE — LETTER
CONTROLLED SUBSTANCE MEDICATION AGREEMENT     Patient Name: Arlene Kirkpatrick  Patient YOB: 1960   I understand, that controlled substance medications may be used to help better manage my symptoms and to improve my ability to function at home, work and in social settings. However, I also understand that these medications do have risks, which have been discussed with me, including possible development of physical or psychological dependence. I understand that successful treatment requires mutual trust and honesty between me and my provider. I understand and agree that following this Medication Agreement is necessary in continuing my provider-patient relationship and the success of my treatment plan. Explanation from my Provider: Benefits and Goals of Controlled Substance Medications: There are two potential goals for your treatment: (1) decreased pain and suffering (2) improved daily life functions. There are many possible treatments for your chronic condition(s). Alternatives such as physical therapy, yoga, massage, home daily exercise, meditation, relaxation techniques, injections, chiropractic manipulations, surgery, cognitive therapy, hypnosis and many medications that are not habit-forming may be used. Use of controlled substance medications may be helpful, but they are unlikely to resolve all symptoms or restore all function. Explanation from my Provider: Risks of Controlled Substance Medications:  Opioid pain medications: These medications can lead to problems such as addiction/dependence, sedation, lightheadedness/dizziness, memory issues, falls, constipation, nausea, or vomiting. They may also impair the ability to drive or operate machinery. Additionally, these medications may lower testosterone levels, leading to loss of bone strength, stamina and sex drive.   They may cause problems with breathing, sleep apnea and reduced coughing, which is especially dangerous for patients with lung disease. Overdose or dangerous interactions with alcohol and other medications may occur, leading to death. Hyperalgesia may develop, which means patients receiving opioids for the treatment of pain may become more sensitive to certain painful stimuli, and in some cases, experience pain from ordinarily non-painful stimuli. Women between the ages of 14-53 who could become pregnant should carefully weigh the risks and benefits of opioids with their physicians, as these medications increase the risk of pregnancy complications, including miscarriage,  delivery and stillbirth. It is also possible for babies to be born addicted to opioids. Opioid dependence withdrawal symptoms may include; feelings of uneasiness, increased pain, irritability, belly pain, diarrhea, sweats and goose-flesh. Benzodiazepines and non-benzodiazepine sleep medications: These medications can lead to problems such as addiction/dependence, sedation, fatigue, lightheadedness, dizziness, incoordination, falls, depression, hallucinations, and impaired judgment, memory and concentration. The ability to drive and operate machinery may also be affected. Abnormal sleep-related behaviors have been reported, including sleepwalking, driving, making telephone calls, eating, or having sex while not fully awake. These medications can suppress breathing and worsen sleep apnea, particularly when combined with alcohol or other sedating medications, potentially leading to death. Dependence withdrawal symptoms may include tremors, anxiety, hallucinations and seizures. Stimulants:  Common adverse effects include addiction/dependence, increased blood  pressure and heart rate, decreased appetite, nausea, involuntary weight loss, insomnia,                                                                                                                     Initials:_______   irritability, and headaches.   These risks may increase when these medications are combined with other stimulants, such as caffeine pills or energy drinks, certain weight loss supplements and oral decongestants. Dependence withdrawal symptoms may include depressed mood, loss of interest, suicidal thoughts, anxiety, fatigue, appetite changes and agitation. Testosterone replacement therapy:  Potential side effects include increased risk of stroke and heart attack, blood clots, increased blood pressure, increased cholesterol, enlarged prostate, sleep apnea, irritability/aggression and other mood disorders, and decreased fertility. I agree and understand that I and my prescriber have the following rights and responsibilities regarding my treatment plan:     1. MY RIGHTS:  To be informed of my treatment and medication plan. To be an active participant in my health and wellbeing. 2. MY RESPONSIBILITY AND UNDERSTANDING FOR USE OF MEDICATIONS   I will take medications at the dose and frequency as directed. For my safety, I will not increase or change how I take my medications without the recommendation of my healthcare provider.  I will actively participate in any program recommended by my provider which may improve function, including social, physical, psychological programs.  I will not take my medications with alcohol or other drugs not prescribed to me. I understand that drinking alcohol with my medications increases the chances of side effects, including reduced breathing rate and could lead to personal injury when operating machinery.  I understand that if I have a history of substance use disorders, including alcohol or other illicit drugs, that I may be at increased risk of addiction to my medications.  I agree to notify my provider immediately if I should become pregnant so that my treatment plan can be adjusted.    I agree and understand that I shall only receive controlled substance medications from the prescriber that signed this agreement unless there is written agreement among other prescribers of controlled substances outlining the responsibility of the medications being prescribed.  • I understand that the if the controlled medication is not helping to achieve goals, the dosage may be tapered and no longer prescribed.  3. MY RESPONSIBILITY FOR COMMUNICATION / PRESCRIPTION RENEWALS  • I agree that all controlled substance medications that I take will be prescribed only by my provider.  If another healthcare provider prescribes me medication in an emergency, I will notify my provider within seventy-two (72) hours.  • I will arrange for refills at the prescribed interval ONLY during regular office hours. I will not ask for refills earlier than agreed, after-hours, on holidays or weekends.  Refills may take up to 72 hours for processing and prescriptions to reach the pharmacy.  • I will inform my other health care providers that I am taking these medications and of the existence of this MEDICATION AGREEMENT. In the event of an emergency, I will provide the same information to the emergency department prescribers.  • I will keep my provider updated on the pharmacy I am using for controlled medication prescription filling.  Initials:_______  4. MY RESPONSIBILITY FOR PROTECTING MEDICATIONS  • I will protect my prescriptions and medications. I understand that lost or misplaced prescriptions will not be replaced.  • I will keep medications only for my own use and will not share them with others. I will keep all medications away from children.  • I agree that if my medications are adjusted or discontinued, I will properly dispose of any remaining medications.  I understand that I will be required to dispose of any remaining controlled medications as, directed by my prescriber, prior to being provided with any prescriptions for other controlled medications.  Medication drop box locations can be found at:  [employer]    5. MY RESPONSIBILITY WITH ILLEGAL DRUGS    I will not use illegal or street drugs or another person's prescription medications not prescribed to me.  If there are identified addiction type symptoms, then referral to a program may be provided by my provider and I agree to follow through with this recommendation. 6. MY RESPONSIBILITY FOR COOPERATION WITH INVESTIGATIONS   I understand that my provider will comply with any applicable law and may discuss my use and/or possible misuse/abuse of controlled substances and alcohol, as appropriate, with any health care provider involved in my care, pharmacist, or legal authority.  I authorize my provider and pharmacy to cooperate fully with law enforcement agencies (as permitted by law) in the investigation of any possible misuse, sale, or other diversion of my controlled substances.  I agree to waive any applicable privilege or right of privacy or confidentiality with respect to these authorizations. 7. PROVIDERS RIGHT TO MONITOR FOR SAFETY: PRESCRIPTION MONITORING / DRUG TESTING   I consent to drug/toxicology screening and will submit to a drug screen upon my providers request to assure I am only taking the prescribed drugs for my safety monitoring. I understand that a drug screen is a laboratory test in which a sample of my urine, blood or saliva is checked to see what drugs I have been taking. This may entail an observed urine specimen, which means that a nurse or other health care provider may watch me provide urine, and I will cooperate if I am asked to provide an observed specimen.  I understand that my provider will check a copy of my State Prescription Monitoring Program () Report in order to safely prescribe medications.  Pill Counts: I consent to pill counts when requested.   I may be asked to bring all my prescribed controlled substance medications, in their original bottles, to all of my scheduled appointments. In addition, my provider may ask me to come to the practice at any time for a random pill count. 8. TERMINATION OF THIS AGREEMENT  For my safety, my prescriber has the right to stop prescribing controlled substance medications and may end this agreement. Initials:_______   Conditions that may result in termination of this agreement:  a. I do not show any improvement in pain, or my activity has not improved. b. I develop rapid tolerance or loss of improvement, as described in my treatment plan.  c. I develop significant side effects from the medication. d. My behavior is not consistent with the responsibilities outlined above, thereby causing safety concerns to continue prescribing controlled substance medications. e. I fail to follow the terms of this agreement. f. Other:____________________________       UNDERSTANDING THIS MEDICATION AGREEMENT:    I have read the above and have had all my questions answered. For chronic disease management, I know that my symptoms can be managed with many types of treatments. A chronic medication trial may be part of my treatment, but I must be an active participant in my care. Medication therapy is only one part of my symptom management plan. In some cases, there may be limited scientific evidence to support the chronic use of certain medications to improve symptoms and daily function. Furthermore, in certain circumstances, there may be scientific information that suggests that the use of chronic controlled substances may worsen my symptoms and increase my risk of unintentional death directly related to this medication therapy. I know that if my provider feels my risk from controlled medications is greater than my benefit, I will have my controlled substance medication(s) compassionately lowered or removed altogether.      I further agree to allow this office to contact my HIPAA contact if there are concerns about my safety and use of the controlled medications. I have agreed to use the prescribed controlled substance medications to me as instructed by my provider and as stated in this Medication Agreement. My initial on each page and my signature below shows that I have read each page and I have had the opportunity to ask questions with answers provided by my provider.     Patient Name (Printed): _____________________________________  Patient Signature:  ______________________   Date: _____________    Prescriber Name (Printed): ___________________________________  Prescriber Signature: _____________________  Date: _____________

## 2023-03-01 NOTE — PROGRESS NOTES
SUBJECTIVE:    Patient ID: Bri Evans is a56 y.o. male. Bri Evans is here today for Medication Refill (Patient presents for medication refills and check up.) and Discuss Medications (Patient has been taking Amlodipine 2.5 since x 6 months since [place] the [place])  . HPI:   HPI     Is here today for follow-up on routine health care. He does have history of complex regional pain syndrome and previously was evaluated with [facility]. He has been well controlled with the use of gabapentin. He also has hyperlipidemia and continues to use atorvastatin. Hypertension has been well controlled with low-dose amlodipine as well as olmesartan and chlorthalidone. He has had colonoscopy with Dr. Zuly Mancilla and that was approximately 1 month ago. He does state to me that he is on a 5-year recall. Fasting lab work is due but with this afternoon appointment he is not fasting and will have to return for that lab work but will [place] boat on [place] and may have to do it once he is home next month. Bilat knee pain  Wearing support bands to both knees  Has been eval by ortho and is putting off surgery as long as possible            Past Medical History:   Diagnosis Date    Complex regional pain syndrome i of right lower limb 5/30/2018    GERD (gastroesophageal reflux disease)     Hyperlipidemia     Hypertension      Prior to Visit Medications    Medication Sig Taking? Authorizing Provider   gabapentin (NEURONTIN) 300 MG capsule Take 3 capsules by mouth 3 times daily for 30 days. Max Daily Amount: 2,700 mg Yes GIOVANNY Garza   amLODIPine (NORVASC) 2.5 MG tablet TAKE 1 TABLET BY MOUTH EVERY DAY IN THE MORNING Yes GIOVANNY Garza   chlorthalidone (HYGROTON) 25 MG tablet TAKE 1 TABLET BY MOUTH DAILY. Yes GIOVANNY Garza   olmesartan (BENICAR) 40 MG tablet Take 1 tablet by mouth in the morning.  Yes GIOVANNY Garza   atorvastatin (LIPITOR) 40 MG tablet TAKE 0.5 TABLET BY MOUTH EVERY DAY Yes Leonila Guadalupe APRN   Blood Pressure KIT Check bp 3x/wk   Dx-htn Yes Leonilakiya Guadalupe APRN   Multiple Vitamins-Minerals (CENTRUM SILVER 50+MEN) TABS Take by mouth Yes Historical Provider, MD   acetaminophen (TYLENOL 8 HOUR ARTHRITIS PAIN) 650 MG extended release tablet Take 1 tablet by mouth every 8 hours as needed for Pain  Patient taking differently: Take 1,300 mg by mouth three times daily Yes Leonila Guadalupe APRN     Allergies   Allergen Reactions    Penicillins Hives     Other reaction(s):  Other (see comments)  Patient states he passed out the last time he took penicillin      Past Surgical History:   Procedure Laterality Date    FINGER SURGERY      OTHER SURGICAL HISTORY  2017    colonoscopy done at [facility] COLONOSCOPY W/BIOPSY SINGLE/MULTIPLE N/A 2017    Dr Laure Valiente x 4--5 yr recall     Family History   Problem Relation Age of Onset    High Blood Pressure Mother     Cancer Mother      Social History     Socioeconomic History    Marital status:      Spouse name: Not on file    Number of children: Not on file    Years of education: Not on file    Highest education level: Not on file   Occupational History    Not on file   Tobacco Use    Smoking status: Former     Packs/day: 1.50     Years: 45.00     Pack years: 67.50     Types: Cigarettes     Quit date: 1973     Years since quittin.2    Smokeless tobacco: Never   Substance and Sexual Activity    Alcohol use: No    Drug use: No    Sexual activity: Yes     Partners: Female   Other Topics Concern    Not on file   Social History Narrative    Not on file     Social Determinants of Health     Financial Resource Strain: Low Risk     Difficulty of Paying Living Expenses: Not hard at all   Food Insecurity: No Food Insecurity    Worried About Running Out of Food in the Last Year: Never true    Ran Out of Food in the Last Year: Never true   Transportation Needs: Not on file   Physical Activity: Not on file   Stress: Not on file   Social Connections: Not on file   Intimate Partner Violence: Not on file   Housing Stability: Not on file       Review of Systems   Constitutional:  Negative for unexpected weight change. HENT:  Negative for trouble swallowing. Respiratory: Negative. Cardiovascular: Negative. Gastrointestinal: Negative. Musculoskeletal:  Positive for arthralgias (knees). Leg pain/right LE   Neurological: Negative. Psychiatric/Behavioral: Negative. OBJECTIVE:    Physical Exam  Vitals and nursing note reviewed. Constitutional:       General: He is not in acute distress. Appearance: Normal appearance. He is well-developed. He is not ill-appearing or toxic-appearing. HENT:      Head: Normocephalic and atraumatic. Right Ear: Tympanic membrane and external ear normal. There is no impacted cerumen. Left Ear: There is impacted cerumen. Ears:      Comments: TM wnl after ear wash     Mouth/Throat:      Mouth: Mucous membranes are moist.      Pharynx: No oropharyngeal exudate or posterior oropharyngeal erythema. Eyes:      Extraocular Movements: Extraocular movements intact. Conjunctiva/sclera: Conjunctivae normal.      Pupils: Pupils are equal, round, and reactive to light. Neck:      Vascular: No carotid bruit. Trachea: No tracheal deviation. Cardiovascular:      Rate and Rhythm: Normal rate and regular rhythm. Heart sounds: Normal heart sounds. No murmur heard. Pulmonary:      Effort: Pulmonary effort is normal.      Breath sounds: Normal breath sounds. Abdominal:      Palpations: Abdomen is soft. Musculoskeletal:      Cervical back: Neck supple. No rigidity. Right lower leg: No edema. Left lower leg: No edema. Skin:     General: Skin is warm and dry. Capillary Refill: Capillary refill takes less than 2 seconds. Neurological:      General: No focal deficit present.       Mental Status: He is alert and oriented to person, place, and time. Mental status is at baseline. Psychiatric:         Mood and Affect: Mood normal. Mood is not anxious or depressed. Affect is not angry. Speech: Speech normal.         Behavior: Behavior normal.         Thought Content: Thought content normal.         Judgment: Judgment normal.       /70 (Site: Left Upper Arm, Position: Sitting, Cuff Size: Small Adult)   Pulse (!) 108   Temp 97.7 °F (36.5 °C) (Infrared)   Resp 20   Ht 5' 6\" (1.676 m)   Wt 217 lb (98.4 kg)   SpO2 98%   BMI 35.02 kg/m²      ASSESSMENT:      ICD-10-CM    1. Essential hypertension  I10 CBC with Auto Differential     Comprehensive Metabolic Panel w/ Reflex to MG      2. Complex regional pain syndrome i of right lower limb  G90.521 gabapentin (NEURONTIN) 300 MG capsule      3. Medication management  Z79.899 POCT Rapid Drug Screen      4. IFG (impaired fasting glucose)  R73.01 Hemoglobin A1C      5. Vitamin D insufficiency  E55.9 Vitamin D 25 Hydroxy      6. Mixed hyperlipidemia  E78.2 Lipid Panel     TSH with Reflex to FT4      7. Prostate cancer screening  Z12.5 PSA Screening      8. Impacted cerumen, left ear  H61.22 25752 - MO REMOVE IMPACTED EAR WAX      9. Osteoarthritis of both knees, unspecified osteoarthritis type  M17.0 Continue knee supports and f/u with ortho          PLAN:    [place]: Medication Refill (Patient presents for medication refills and check up.) and Discuss Medications (Patient has been taking Amlodipine 2.5 since x 6 months since [place] the boat)  Contract yearly needed  UDS  DOMINGO  Update  cscope-printed  Ear wash  Plan as above. Diagnosis and orders for this visit are above. Please note that this chart was generated using dragon dictationsoftware. Although every effort was made to ensure the accuracy of this automated transcription, some errors in transcription may have occurred.

## 2023-05-23 DIAGNOSIS — E55.9 VITAMIN D INSUFFICIENCY: ICD-10-CM

## 2023-05-23 DIAGNOSIS — Z12.5 PROSTATE CANCER SCREENING: ICD-10-CM

## 2023-05-23 DIAGNOSIS — I10 ESSENTIAL HYPERTENSION: ICD-10-CM

## 2023-05-23 DIAGNOSIS — R73.01 IFG (IMPAIRED FASTING GLUCOSE): ICD-10-CM

## 2023-05-23 DIAGNOSIS — E78.2 MIXED HYPERLIPIDEMIA: ICD-10-CM

## 2023-05-23 LAB
25(OH)D3 SERPL-MCNC: 30.4 NG/ML
ALBUMIN SERPL-MCNC: 4.4 G/DL (ref 3.5–5.2)
ALP SERPL-CCNC: 101 U/L (ref 40–130)
ALT SERPL-CCNC: 22 U/L (ref 5–41)
ANION GAP SERPL CALCULATED.3IONS-SCNC: 11 MMOL/L (ref 7–19)
AST SERPL-CCNC: 20 U/L (ref 5–40)
BASOPHILS # BLD: 0.1 K/UL (ref 0–0.2)
BASOPHILS NFR BLD: 1 % (ref 0–1)
BILIRUB SERPL-MCNC: 0.3 MG/DL (ref 0.2–1.2)
BUN SERPL-MCNC: 25 MG/DL (ref 8–23)
CALCIUM SERPL-MCNC: 9.8 MG/DL (ref 8.8–10.2)
CHLORIDE SERPL-SCNC: 104 MMOL/L (ref 98–111)
CHOLEST SERPL-MCNC: 127 MG/DL (ref 160–199)
CO2 SERPL-SCNC: 28 MMOL/L (ref 22–29)
CREAT SERPL-MCNC: 0.8 MG/DL (ref 0.5–1.2)
EOSINOPHIL # BLD: 0.3 K/UL (ref 0–0.6)
EOSINOPHIL NFR BLD: 2.7 % (ref 0–5)
ERYTHROCYTE [DISTWIDTH] IN BLOOD BY AUTOMATED COUNT: 13.2 % (ref 11.5–14.5)
GLUCOSE SERPL-MCNC: 108 MG/DL (ref 74–109)
HBA1C MFR BLD: 6.1 % (ref 4–6)
HCT VFR BLD AUTO: 45.1 % (ref 42–52)
HDLC SERPL-MCNC: 36 MG/DL (ref 55–121)
HGB BLD-MCNC: 15.3 G/DL (ref 14–18)
IMM GRANULOCYTES # BLD: 0 K/UL
LDLC SERPL CALC-MCNC: 74 MG/DL
LYMPHOCYTES # BLD: 3.5 K/UL (ref 1.1–4.5)
LYMPHOCYTES NFR BLD: 36.3 % (ref 20–40)
MAGNESIUM SERPL-MCNC: 2.1 MG/DL (ref 1.6–2.4)
MCH RBC QN AUTO: 32.7 PG (ref 27–31)
MCHC RBC AUTO-ENTMCNC: 33.9 G/DL (ref 33–37)
MCV RBC AUTO: 96.4 FL (ref 80–94)
MONOCYTES # BLD: 0.8 K/UL (ref 0–0.9)
MONOCYTES NFR BLD: 8 % (ref 0–10)
NEUTROPHILS # BLD: 4.9 K/UL (ref 1.5–7.5)
NEUTS SEG NFR BLD: 51.8 % (ref 50–65)
PLATELET # BLD AUTO: 171 K/UL (ref 130–400)
PMV BLD AUTO: 11.4 FL (ref 9.4–12.4)
POTASSIUM SERPL-SCNC: 3.5 MMOL/L (ref 3.5–5)
PROT SERPL-MCNC: 6.5 G/DL (ref 6.6–8.7)
PSA SERPL-MCNC: 0.53 NG/ML (ref 0–4)
RBC # BLD AUTO: 4.68 M/UL (ref 4.7–6.1)
SODIUM SERPL-SCNC: 143 MMOL/L (ref 136–145)
TRIGL SERPL-MCNC: 87 MG/DL (ref 0–149)
TSH SERPL DL<=0.005 MIU/L-ACNC: 0.76 UIU/ML (ref 0.35–5.5)
WBC # BLD AUTO: 9.6 K/UL (ref 4.8–10.8)

## 2023-06-09 DIAGNOSIS — G90.521 COMPLEX REGIONAL PAIN SYNDROME I OF RIGHT LOWER LIMB: ICD-10-CM

## 2023-06-09 RX ORDER — GABAPENTIN 300 MG/1
900 CAPSULE ORAL 3 TIMES DAILY
Qty: 810 CAPSULE | Refills: 0 | OUTPATIENT
Start: 2023-06-09 | End: 2023-07-09

## 2023-06-09 RX ORDER — GABAPENTIN 300 MG/1
CAPSULE ORAL
Qty: 810 CAPSULE | Refills: 0 | OUTPATIENT
Start: 2023-06-09

## 2023-06-09 NOTE — TELEPHONE ENCOUNTER
Ana Harrison called to request a refill on his medication. Last office visit : 3/1/2023   Next office visit : Visit date not found     Last UDS:   Amphetamine Screen, Urine   Date Value Ref Range Status   2022 negative  Final     Barbiturate Screen, Urine   Date Value Ref Range Status   2022 negative  Final     Benzodiazepine Screen, Urine   Date Value Ref Range Status   2022 negative  Final     Buprenorphine Urine   Date Value Ref Range Status   2022 negative  Final     Cocaine Metabolite Screen, Urine   Date Value Ref Range Status   2022 negative  Final     Gabapentin Screen, Urine   Date Value Ref Range Status   2022 not tested  Final     MDMA, Urine   Date Value Ref Range Status   2022 negative  Final     Methamphetamine, Urine   Date Value Ref Range Status   2022 negative  Final     Opiate Scrn, Ur   Date Value Ref Range Status   2022 negative  Final     Oxycodone Screen, Ur   Date Value Ref Range Status   2022 negative  Final     PCP Screen, Urine   Date Value Ref Range Status   2022 negative  Final     Propoxyphene Screen, Urine   Date Value Ref Range Status   2022 not tested  Final     THC Screen, Urine   Date Value Ref Range Status   2022 negative  Final     Tricyclic Antidepressants, Urine   Date Value Ref Range Status   2022 not tested  Final       Last Elvera Boss: 2023  Medication Contract: DRUG SCREEN AND MED CONTRACT ARE   Last Fill: 2023    Requested Prescriptions     Pending Prescriptions Disp Refills    gabapentin (NEURONTIN) 300 MG capsule [Pharmacy Med Name: GABAPENTIN 300 MG CAPSULE] 810 capsule 0     Sig: TAKE 3 CAPSULES BY MOUTH 3 TIMES DAILY         Please approve or refuse this medication.    Katlyn Cooper

## 2023-06-26 DIAGNOSIS — G90.521 COMPLEX REGIONAL PAIN SYNDROME I OF RIGHT LOWER LIMB: ICD-10-CM

## 2023-06-26 RX ORDER — GABAPENTIN 300 MG/1
900 CAPSULE ORAL 3 TIMES DAILY
Qty: 810 CAPSULE | Refills: 0 | Status: SHIPPED | OUTPATIENT
Start: 2023-06-26 | End: 2023-09-24

## 2023-07-15 DIAGNOSIS — I10 ESSENTIAL HYPERTENSION: ICD-10-CM

## 2023-07-17 NOTE — TELEPHONE ENCOUNTER
PCP is out of the office for 1 week. Burrell Boxer called to request a refill on his medication.       Last office visit : 3/1/2023   Next office visit : Visit date not found     Requested Prescriptions     Pending Prescriptions Disp Refills    olmesartan (BENICAR) 40 MG tablet [Pharmacy Med Name: OLMESARTAN MEDOXOMIL 40 MG TAB] 90 tablet 3     Sig: TAKE 1 TABLET BY MOUTH EVERY DAY IN THE MORNING    chlorthalidone (HYGROTON) 25 MG tablet [Pharmacy Med Name: CHLORTHALIDONE 25 MG TABLET] 90 tablet 3     Sig: TAKE 1 TABLET BY MOUTH EVERY DAY            Jamar Swanson

## 2023-07-18 RX ORDER — OLMESARTAN MEDOXOMIL 40 MG/1
TABLET ORAL
Qty: 30 TABLET | Refills: 0 | Status: SHIPPED | OUTPATIENT
Start: 2023-07-18 | End: 2023-07-24 | Stop reason: SDUPTHER

## 2023-07-18 RX ORDER — CHLORTHALIDONE 25 MG/1
TABLET ORAL
Qty: 30 TABLET | Refills: 0 | Status: SHIPPED | OUTPATIENT
Start: 2023-07-18 | End: 2023-07-24 | Stop reason: SDUPTHER

## 2023-07-20 ENCOUNTER — PATIENT MESSAGE (OUTPATIENT)
Dept: FAMILY MEDICINE CLINIC | Age: 63
End: 2023-07-20

## 2023-07-20 DIAGNOSIS — I10 ESSENTIAL HYPERTENSION: ICD-10-CM

## 2023-07-24 RX ORDER — CHLORTHALIDONE 25 MG/1
TABLET ORAL
Qty: 90 TABLET | Refills: 1 | Status: SHIPPED | OUTPATIENT
Start: 2023-07-24

## 2023-07-24 RX ORDER — OLMESARTAN MEDOXOMIL 40 MG/1
40 TABLET ORAL EVERY MORNING
Qty: 90 TABLET | Refills: 1 | Status: SHIPPED | OUTPATIENT
Start: 2023-07-24

## 2023-08-19 DIAGNOSIS — E78.2 MIXED HYPERLIPIDEMIA: ICD-10-CM

## 2023-08-21 RX ORDER — ATORVASTATIN CALCIUM 40 MG/1
TABLET, FILM COATED ORAL
Qty: 45 TABLET | Refills: 7 | Status: SHIPPED | OUTPATIENT
Start: 2023-08-21

## 2023-08-21 NOTE — TELEPHONE ENCOUNTER
Justinekory Smith called to request a refill on his medication.       Last office visit : 3/1/2023   Next office visit : Visit date not found     Requested Prescriptions     Pending Prescriptions Disp Refills    atorvastatin (LIPITOR) 40 MG tablet [Pharmacy Med Name: ATORVASTATIN 40 MG TABLET] 45 tablet 7     Sig: TAKE 1/2 TABLET BY MOUTH EVERY DAY            Sammy Valenzuela [place]

## 2023-09-23 DIAGNOSIS — I10 ESSENTIAL HYPERTENSION: ICD-10-CM

## 2023-09-25 ENCOUNTER — OFFICE VISIT (OUTPATIENT)
Dept: FAMILY MEDICINE CLINIC | Age: 63
End: 2023-09-25
Payer: COMMERCIAL

## 2023-09-25 VITALS
HEIGHT: 66 IN | TEMPERATURE: 97.7 F | BODY MASS INDEX: 34.55 KG/M2 | SYSTOLIC BLOOD PRESSURE: 134 MMHG | HEART RATE: 74 BPM | OXYGEN SATURATION: 99 % | RESPIRATION RATE: 20 BRPM | WEIGHT: 215 LBS | DIASTOLIC BLOOD PRESSURE: 78 MMHG

## 2023-09-25 DIAGNOSIS — G90.521 COMPLEX REGIONAL PAIN SYNDROME I OF RIGHT LOWER LIMB: ICD-10-CM

## 2023-09-25 DIAGNOSIS — R73.01 IFG (IMPAIRED FASTING GLUCOSE): ICD-10-CM

## 2023-09-25 DIAGNOSIS — H10.9 CONJUNCTIVITIS OF RIGHT EYE, UNSPECIFIED CONJUNCTIVITIS TYPE: Primary | ICD-10-CM

## 2023-09-25 DIAGNOSIS — E55.9 VITAMIN D INSUFFICIENCY: ICD-10-CM

## 2023-09-25 DIAGNOSIS — I10 ESSENTIAL HYPERTENSION: ICD-10-CM

## 2023-09-25 PROCEDURE — 3075F SYST BP GE 130 - 139MM HG: CPT | Performed by: NURSE PRACTITIONER

## 2023-09-25 PROCEDURE — 99214 OFFICE O/P EST MOD 30 MIN: CPT | Performed by: NURSE PRACTITIONER

## 2023-09-25 PROCEDURE — 3078F DIAST BP <80 MM HG: CPT | Performed by: NURSE PRACTITIONER

## 2023-09-25 RX ORDER — GABAPENTIN 300 MG/1
900 CAPSULE ORAL 3 TIMES DAILY
Qty: 810 CAPSULE | Refills: 0 | Status: SHIPPED | OUTPATIENT
Start: 2023-09-25 | End: 2023-12-24

## 2023-09-25 RX ORDER — AMLODIPINE BESYLATE 2.5 MG/1
TABLET ORAL
Qty: 90 TABLET | Refills: 1 | Status: SHIPPED | OUTPATIENT
Start: 2023-09-25

## 2023-09-25 RX ORDER — TOBRAMYCIN 3 MG/ML
1 SOLUTION/ DROPS OPHTHALMIC EVERY 6 HOURS
Qty: 5 ML | Refills: 0 | Status: SHIPPED | OUTPATIENT
Start: 2023-09-25 | End: 2023-10-05

## 2023-09-25 ASSESSMENT — ENCOUNTER SYMPTOMS: RESPIRATORY NEGATIVE: 1

## 2023-09-25 NOTE — TELEPHONE ENCOUNTER
Ced Telles called to request a refill on his medication.       Last office visit : 3/1/2023   Next office visit : 9/25/2023     Requested Prescriptions     Pending Prescriptions Disp Refills    amLODIPine (NORVASC) 2.5 MG tablet [Pharmacy Med Name: AMLODIPINE BESYLATE 2.5 MG TAB] 90 tablet 1     Sig: TAKE 1 TABLET BY MOUTH EVERY DAY IN THE MORNING            Radha Pappas, [address] [facility]
You can access the FollowMyHealth Patient Portal offered by [facility] by registering at the following website: [email or web address]. By joining [facility]’s FollowMyHealth portal, you will also be able to view your health information using other applications (apps) compatible with our system.

## 2023-09-25 NOTE — PROGRESS NOTES
Patient had a negative drug screen in March but it hadn't been entered in [place] and than the patient came again in June to repeat test and it was negative again. Results are entered in Epic now.
acetaminophen (TYLENOL 8 HOUR ARTHRITIS PAIN) 650 MG extended release tablet Take 1 tablet by mouth every 8 hours as needed for Pain  Patient taking differently: Take 2 tablets by mouth three times daily Yes GIOVANNY Garza     Allergies   Allergen Reactions    Penicillins Hives     Other reaction(s):  Other (see comments)  Patient states he passed out the last time he took penicillin      Past Surgical History:   Procedure Laterality Date    FINGER SURGERY      OTHER SURGICAL HISTORY  2017    colonoscopy done at [facility] COLONOSCOPY W/BIOPSY SINGLE/MULTIPLE N/A 2017    Dr Hayden Brunner x 4--5 yr recall     Family History   Problem Relation Age of Onset    High Blood Pressure Mother     Cancer Mother      Social History     Socioeconomic History    Marital status:      Spouse name: Not on file    Number of children: Not on file    Years of education: Not on file    Highest education level: Not on file   Occupational History    Not on file   Tobacco Use    Smoking status: Former     Packs/day: 1.50     Years: 45.00     Additional pack years: 0.00     Total pack years: 67.50     Types: Cigarettes     Quit date: 1973     Years since quittin.8    Smokeless tobacco: Never   Substance and Sexual Activity    Alcohol use: No    Drug use: No    Sexual activity: Yes     Partners: Female   Other Topics Concern    Not on file   Social History Narrative    Not on file     Social Determinants of Health     Financial Resource Strain: Low Risk  (2022)    Overall Financial Resource Strain (CARDIA)     Difficulty of Paying Living Expenses: Not hard at all   Food Insecurity: No Food Insecurity (2022)    Hunger Vital Sign     Worried About Running Out of Food in the Last Year: Never true     [address] in the Last Year: Never true   Transportation Needs: No Transportation Needs (2021)    PRAPARE - Transportation     Lack of Transportation (Medical): No     Lack of Transportation

## 2023-09-28 LAB
BACTERIA SPEC AEROBE CULT: ABNORMAL
BACTERIA SPEC AEROBE CULT: ABNORMAL
GRAM STN SPEC: ABNORMAL
ORGANISM: ABNORMAL
ORGANISM: ABNORMAL

## 2023-09-28 RX ORDER — SULFAMETHOXAZOLE AND TRIMETHOPRIM 800; 160 MG/1; MG/1
1 TABLET ORAL 2 TIMES DAILY
Qty: 20 TABLET | Refills: 0 | Status: SHIPPED | OUTPATIENT
Start: 2023-09-28 | End: 2023-10-08

## 2023-11-06 DIAGNOSIS — R73.01 IFG (IMPAIRED FASTING GLUCOSE): ICD-10-CM

## 2023-11-06 DIAGNOSIS — I10 ESSENTIAL HYPERTENSION: ICD-10-CM

## 2023-11-06 LAB
ALBUMIN SERPL-MCNC: 4.5 G/DL (ref 3.5–5.2)
ALP SERPL-CCNC: 99 U/L (ref 40–130)
ALT SERPL-CCNC: 25 U/L (ref 5–41)
ANION GAP SERPL CALCULATED.3IONS-SCNC: 10 MMOL/L (ref 7–19)
AST SERPL-CCNC: 21 U/L (ref 5–40)
BILIRUB SERPL-MCNC: 0.4 MG/DL (ref 0.2–1.2)
BUN SERPL-MCNC: 19 MG/DL (ref 8–23)
CALCIUM SERPL-MCNC: 10.3 MG/DL (ref 8.8–10.2)
CHLORIDE SERPL-SCNC: 103 MMOL/L (ref 98–111)
CHOLEST SERPL-MCNC: 132 MG/DL (ref 160–199)
CO2 SERPL-SCNC: 31 MMOL/L (ref 22–29)
CREAT SERPL-MCNC: 0.9 MG/DL (ref 0.5–1.2)
GLUCOSE SERPL-MCNC: 120 MG/DL (ref 74–109)
HBA1C MFR BLD: 6 % (ref 4–6)
HDLC SERPL-MCNC: 37 MG/DL (ref 55–121)
LDLC SERPL CALC-MCNC: 76 MG/DL
POTASSIUM SERPL-SCNC: 3.5 MMOL/L (ref 3.5–5)
PROT SERPL-MCNC: 7 G/DL (ref 6.6–8.7)
SODIUM SERPL-SCNC: 144 MMOL/L (ref 136–145)
TRIGL SERPL-MCNC: 93 MG/DL (ref 0–149)

## 2023-11-18 DIAGNOSIS — I10 ESSENTIAL HYPERTENSION: ICD-10-CM

## 2023-11-20 DIAGNOSIS — I10 ESSENTIAL HYPERTENSION: ICD-10-CM

## 2023-11-20 NOTE — TELEPHONE ENCOUNTER
Pallavi Colon called to request a refill on his medication.       Last office visit : 9/25/2023   Next office visit : Visit date not found     Requested Prescriptions     Pending Prescriptions Disp Refills    olmesartan (BENICAR) 40 MG tablet 90 tablet 1     Sig: Take 1 tablet by mouth every morning            Wally Mcfarland CMA

## 2023-11-21 RX ORDER — OLMESARTAN MEDOXOMIL 40 MG/1
40 TABLET ORAL EVERY MORNING
Qty: 90 TABLET | Refills: 1 | Status: SHIPPED | OUTPATIENT
Start: 2023-11-21

## 2023-11-21 RX ORDER — OLMESARTAN MEDOXOMIL 40 MG/1
40 TABLET ORAL EVERY MORNING
Qty: 90 TABLET | Refills: 1 | OUTPATIENT
Start: 2023-11-21

## 2023-11-27 DIAGNOSIS — G90.521 COMPLEX REGIONAL PAIN SYNDROME I OF RIGHT LOWER LIMB: ICD-10-CM

## 2023-11-29 NOTE — TELEPHONE ENCOUNTER
Paul Hackett called to request a refill on his medication. Last office visit : 9/25/2023   Next office visit : Visit date not found     Last UDS:   Amphetamine Screen, Urine   Date Value Ref Range Status   03/01/2023 negative  Final     Barbiturate Screen, Urine   Date Value Ref Range Status   03/01/2023 negative  Final     Benzodiazepine Screen, Urine   Date Value Ref Range Status   03/01/2023 negative  Final     Buprenorphine Urine   Date Value Ref Range Status   03/01/2023 negative  Final     Cocaine Metabolite Screen, Urine   Date Value Ref Range Status   03/01/2023 negative  Final     Gabapentin Screen, Urine   Date Value Ref Range Status   03/01/2023 not tested  Final     MDMA, Urine   Date Value Ref Range Status   03/01/2023 negative  Final     Methamphetamine, Urine   Date Value Ref Range Status   03/01/2023 negative  Final     Opiate Scrn, Ur   Date Value Ref Range Status   03/01/2023 negative  Final     Oxycodone Screen, Ur   Date Value Ref Range Status   03/01/2023 negative  Final     PCP Screen, Urine   Date Value Ref Range Status   03/01/2023 negative  Final     Propoxyphene Screen, Urine   Date Value Ref Range Status   03/01/2023 not tested  Final     THC Screen, Urine   Date Value Ref Range Status   03/01/2023 negative  Final     Tricyclic Antidepressants, Urine   Date Value Ref Range Status   03/01/2023 negative  Final       Last Shellia Spore: 11/29/2023  Medication Contract: 06/29/2023   Last Fill: 09/26/2023    Requested Prescriptions     Pending Prescriptions Disp Refills    gabapentin (NEURONTIN) 300 MG capsule 810 capsule 0     Sig: Take 3 capsules by mouth 3 times daily for 90 days. Max Daily Amount: 2,700 mg         Please approve or refuse this medication.    Fransisco Latham MA

## 2023-11-29 NOTE — TELEPHONE ENCOUNTER
It looks like I sent this 2mo ago. Please make sure he is still ok on this for now. Call back 7-10d prior to need.

## 2023-12-01 RX ORDER — GABAPENTIN 300 MG/1
900 CAPSULE ORAL 3 TIMES DAILY
Qty: 810 CAPSULE | Refills: 0 | OUTPATIENT
Start: 2023-12-01 | End: 2024-02-29

## 2023-12-23 DIAGNOSIS — G90.521 COMPLEX REGIONAL PAIN SYNDROME I OF RIGHT LOWER LIMB: ICD-10-CM

## 2023-12-27 DIAGNOSIS — G90.521 COMPLEX REGIONAL PAIN SYNDROME I OF RIGHT LOWER LIMB: ICD-10-CM

## 2023-12-27 RX ORDER — GABAPENTIN 300 MG/1
900 CAPSULE ORAL 3 TIMES DAILY
Qty: 810 CAPSULE | Refills: 0 | OUTPATIENT
Start: 2023-12-27 | End: 2024-03-26

## 2023-12-27 RX ORDER — GABAPENTIN 300 MG/1
900 CAPSULE ORAL 3 TIMES DAILY
Qty: 810 CAPSULE | Refills: 0 | Status: SHIPPED | OUTPATIENT
Start: 2023-12-27 | End: 2024-03-26

## 2023-12-27 NOTE — TELEPHONE ENCOUNTER
Burrell Boxer called to request a refill on his medication. Last office visit : 9/25/2023   Next office visit : 12/27/2023     Last UDS:   Amphetamine Screen, Urine   Date Value Ref Range Status   03/01/2023 negative  Final     Barbiturate Screen, Urine   Date Value Ref Range Status   03/01/2023 negative  Final     Benzodiazepine Screen, Urine   Date Value Ref Range Status   03/01/2023 negative  Final     Buprenorphine Urine   Date Value Ref Range Status   03/01/2023 negative  Final     Cocaine Metabolite Screen, Urine   Date Value Ref Range Status   03/01/2023 negative  Final     Gabapentin Screen, Urine   Date Value Ref Range Status   03/01/2023 not tested  Final     MDMA, Urine   Date Value Ref Range Status   03/01/2023 negative  Final     Methamphetamine, Urine   Date Value Ref Range Status   03/01/2023 negative  Final     Opiate Scrn, Ur   Date Value Ref Range Status   03/01/2023 negative  Final     Oxycodone Screen, Ur   Date Value Ref Range Status   03/01/2023 negative  Final     PCP Screen, Urine   Date Value Ref Range Status   03/01/2023 negative  Final     Propoxyphene Screen, Urine   Date Value Ref Range Status   03/01/2023 not tested  Final     THC Screen, Urine   Date Value Ref Range Status   03/01/2023 negative  Final     Tricyclic Antidepressants, Urine   Date Value Ref Range Status   03/01/2023 negative  Final       Last Ne Cover: 12/27/2023  Medication Contract: 06/29/2023   Last Fill: 09/26/2023    Requested Prescriptions     Pending Prescriptions Disp Refills    gabapentin (NEURONTIN) 300 MG capsule [Pharmacy Med Name: GABAPENTIN 300 MG CAPSULE] 810 capsule 0     Sig: Take 3 capsules by mouth 3 times daily for 90 days. Max Daily Amount: 2,700 mg         Please approve or refuse this medication.    Ila Randle MA

## 2024-02-21 DIAGNOSIS — I10 ESSENTIAL HYPERTENSION: ICD-10-CM

## 2024-02-21 RX ORDER — OLMESARTAN MEDOXOMIL 40 MG/1
40 TABLET ORAL EVERY MORNING
Qty: 90 TABLET | Refills: 1 | Status: SHIPPED | OUTPATIENT
Start: 2024-02-21

## 2024-02-21 RX ORDER — CHLORTHALIDONE 25 MG/1
TABLET ORAL
Qty: 90 TABLET | Refills: 1 | Status: SHIPPED | OUTPATIENT
Start: 2024-02-21

## 2024-02-21 NOTE — TELEPHONE ENCOUNTER
Ty Salazar called to request a refill on his medication.      Last office visit : 9/25/2023   Next office visit : Visit date not found     Requested Prescriptions     Pending Prescriptions Disp Refills    olmesartan (BENICAR) 40 MG tablet [Pharmacy Med Name: OLMESARTAN MEDOXOMIL 40 MG TAB] 90 tablet 1     Sig: TAKE 1 TABLET BY MOUTH EVERY DAY IN THE MORNING    chlorthalidone (HYGROTON) 25 MG tablet [Pharmacy Med Name: CHLORTHALIDONE 25 MG TABLET] 90 tablet 1     Sig: TAKE 1 TABLET BY MOUTH EVERY DAY            Juani Paige MA

## 2024-03-18 DIAGNOSIS — I10 ESSENTIAL HYPERTENSION: ICD-10-CM

## 2024-03-18 RX ORDER — AMLODIPINE BESYLATE 2.5 MG/1
TABLET ORAL
Qty: 90 TABLET | Refills: 1 | Status: SHIPPED | OUTPATIENT
Start: 2024-03-18

## 2024-03-18 NOTE — TELEPHONE ENCOUNTER
Ty Salazar called to request a refill on his medication.      Last office visit : 9/25/2023   Next office visit : Visit date not found     Requested Prescriptions     Pending Prescriptions Disp Refills    amLODIPine (NORVASC) 2.5 MG tablet [Pharmacy Med Name: AMLODIPINE BESYLATE 2.5 MG TAB] 90 tablet 1     Sig: TAKE 1 TABLET BY MOUTH EVERY DAY IN THE MORNING            Alena Evans MA

## 2024-03-22 ENCOUNTER — OFFICE VISIT (OUTPATIENT)
Dept: FAMILY MEDICINE CLINIC | Age: 64
End: 2024-03-22
Payer: COMMERCIAL

## 2024-03-22 VITALS
BODY MASS INDEX: 35.84 KG/M2 | HEIGHT: 66 IN | RESPIRATION RATE: 20 BRPM | OXYGEN SATURATION: 98 % | TEMPERATURE: 98.1 F | SYSTOLIC BLOOD PRESSURE: 132 MMHG | HEART RATE: 83 BPM | WEIGHT: 223 LBS | DIASTOLIC BLOOD PRESSURE: 64 MMHG

## 2024-03-22 DIAGNOSIS — R73.01 IFG (IMPAIRED FASTING GLUCOSE): ICD-10-CM

## 2024-03-22 DIAGNOSIS — I10 ESSENTIAL HYPERTENSION: ICD-10-CM

## 2024-03-22 DIAGNOSIS — E78.2 MIXED HYPERLIPIDEMIA: ICD-10-CM

## 2024-03-22 DIAGNOSIS — Z12.5 PROSTATE CANCER SCREENING: ICD-10-CM

## 2024-03-22 DIAGNOSIS — R20.2 PARESTHESIA OF BOTH HANDS: ICD-10-CM

## 2024-03-22 DIAGNOSIS — E55.9 VITAMIN D INSUFFICIENCY: ICD-10-CM

## 2024-03-22 DIAGNOSIS — G90.521 COMPLEX REGIONAL PAIN SYNDROME I OF RIGHT LOWER LIMB: Primary | ICD-10-CM

## 2024-03-22 PROCEDURE — 99214 OFFICE O/P EST MOD 30 MIN: CPT | Performed by: NURSE PRACTITIONER

## 2024-03-22 PROCEDURE — 3078F DIAST BP <80 MM HG: CPT | Performed by: NURSE PRACTITIONER

## 2024-03-22 PROCEDURE — 3075F SYST BP GE 130 - 139MM HG: CPT | Performed by: NURSE PRACTITIONER

## 2024-03-22 RX ORDER — GABAPENTIN 300 MG/1
900 CAPSULE ORAL 3 TIMES DAILY
Qty: 270 CAPSULE | Refills: 0 | Status: SHIPPED | OUTPATIENT
Start: 2024-03-22 | End: 2024-04-21

## 2024-03-22 SDOH — ECONOMIC STABILITY: FOOD INSECURITY: WITHIN THE PAST 12 MONTHS, THE FOOD YOU BOUGHT JUST DIDN'T LAST AND YOU DIDN'T HAVE MONEY TO GET MORE.: NEVER TRUE

## 2024-03-22 SDOH — ECONOMIC STABILITY: FOOD INSECURITY: WITHIN THE PAST 12 MONTHS, YOU WORRIED THAT YOUR FOOD WOULD RUN OUT BEFORE YOU GOT MONEY TO BUY MORE.: NEVER TRUE

## 2024-03-22 SDOH — ECONOMIC STABILITY: HOUSING INSECURITY
IN THE LAST 12 MONTHS, WAS THERE A TIME WHEN YOU DID NOT HAVE A STEADY PLACE TO SLEEP OR SLEPT IN A SHELTER (INCLUDING NOW)?: NO

## 2024-03-22 SDOH — ECONOMIC STABILITY: INCOME INSECURITY: HOW HARD IS IT FOR YOU TO PAY FOR THE VERY BASICS LIKE FOOD, HOUSING, MEDICAL CARE, AND HEATING?: NOT HARD AT ALL

## 2024-03-22 ASSESSMENT — PATIENT HEALTH QUESTIONNAIRE - PHQ9
SUM OF ALL RESPONSES TO PHQ9 QUESTIONS 1 & 2: 0
SUM OF ALL RESPONSES TO PHQ QUESTIONS 1-9: 0
2. FEELING DOWN, DEPRESSED OR HOPELESS: NOT AT ALL
1. LITTLE INTEREST OR PLEASURE IN DOING THINGS: NOT AT ALL

## 2024-03-22 ASSESSMENT — ENCOUNTER SYMPTOMS
RESPIRATORY NEGATIVE: 1
BACK PAIN: 1

## 2024-03-22 NOTE — PROGRESS NOTES
SUBJECTIVE:    Patient ID: Ty Salazar is a63 y.o. male.  Ty Salazar is here today for Medication Check (Patient presents for medication check. )  .    HPI:   HPI       Complex regional syndrome dx at [facility].  Has continued to have success with gabapentin.  He does have some insurance/pharmacy changes that are happening.  [place] boat [place], he usually picks up a 90-day supply but that pharmacy has now changed.  He is asking today if I could send in a 1 month supply to that pharmacy until he gets his mail-in pharmacy squared away.  He does have that name today but I have not been able to find it in the system.  He will call back with that information.    Hyperlipidemia-continues to adhere to atorvastatin daily.    Hypertension-normotensive here in office today.  Continues to adhere to olmesartan as well as amlodipine.  He is also adherent to chlorthalidone daily.    Lab work will be coming due at approximately the 2-month evelyn.  However, he will be having preop lab done at [facility] at that time and we have discussed this and he will just come in for lab once he is ambulatory after bilateral total knee replacements are done.    Bilateral third and fourth fingers are reported to be tingly at times.  He does state that when he wakes up in the morning sometimes they are at his worst and he does have to shake them out to make them feel better.    Past Medical History:   Diagnosis Date    Complex regional pain syndrome i of right lower limb 5/30/2018    GERD (gastroesophageal reflux disease)     Hyperlipidemia     Hypertension      Prior to Visit Medications    Medication Sig Taking? Authorizing Provider   gabapentin (NEURONTIN) 300 MG capsule Take 3 capsules by mouth 3 times daily for 30 days. Max Daily Amount: 2,700 mg Yes Leonila Guadalupe APRN   amLODIPine (NORVASC) 2.5 MG tablet TAKE 1 TABLET BY MOUTH EVERY DAY IN THE MORNING Yes Leonila Guadalupe APRN   olmesartan (BENICAR) 40 MG

## 2024-04-01 DIAGNOSIS — I10 ESSENTIAL HYPERTENSION: ICD-10-CM

## 2024-04-01 RX ORDER — CHLORTHALIDONE 25 MG/1
25 TABLET ORAL DAILY
Qty: 90 TABLET | Refills: 1 | Status: SHIPPED | OUTPATIENT
Start: 2024-04-01

## 2024-04-01 NOTE — TELEPHONE ENCOUNTER
New mail order pended     Ty Martin called to request a refill on his medication.      Last office visit : 3/22/2024   Next office visit : Visit date not found     Requested Prescriptions     Pending Prescriptions Disp Refills    chlorthalidone (HYGROTON) 25 MG tablet 90 tablet 1     Sig: Take 1 tablet by mouth daily            Juani Paige MA

## 2024-04-04 ENCOUNTER — TELEPHONE (OUTPATIENT)
Dept: FAMILY MEDICINE CLINIC | Age: 64
End: 2024-04-04

## 2024-04-04 NOTE — TELEPHONE ENCOUNTER
Patient's wife was called and she said that she would schedule NCS for the patient while he is on the boat.

## 2024-04-10 ENCOUNTER — PATIENT MESSAGE (OUTPATIENT)
Dept: FAMILY MEDICINE CLINIC | Age: 64
End: 2024-04-10

## 2024-04-10 DIAGNOSIS — G90.521 COMPLEX REGIONAL PAIN SYNDROME I OF RIGHT LOWER LIMB: ICD-10-CM

## 2024-04-10 RX ORDER — GABAPENTIN 300 MG/1
900 CAPSULE ORAL 3 TIMES DAILY
Qty: 270 CAPSULE | Refills: 0 | Status: CANCELLED | OUTPATIENT
Start: 2024-04-10 | End: 2024-05-10

## 2024-04-10 RX ORDER — GABAPENTIN 300 MG/1
900 CAPSULE ORAL 3 TIMES DAILY
Qty: 810 CAPSULE | Refills: 0 | Status: SHIPPED | OUTPATIENT
Start: 2024-04-10 | End: 2024-07-09

## 2024-04-10 NOTE — TELEPHONE ENCOUNTER
From: Ty Salazar  To: Leonila Guadalupe  Sent: 4/10/2024 3:19 PM CDT  Subject: Prescription     Could you please [facility] Thx Ty

## 2024-04-30 ENCOUNTER — TELEPHONE (OUTPATIENT)
Dept: FAMILY MEDICINE CLINIC | Age: 64
End: 2024-04-30

## 2024-04-30 NOTE — TELEPHONE ENCOUNTER
[facility] (Dr. Abdi) called asking about clearance form that they say that they faxed to the office on 04/19. I have not received a form and I informed the office that we didn't get it and that it needs to be resent and that the patient may need to schedule an appointment for clearance depending on what the form is asking Leonila to clear him for.    Called patient and advised him that he may need to see PCP. Patient states that he went to [facility] on 04/25 and had EKG, and labs. Appointment was scheduled for clearance just in case he needs to see provider.

## 2024-05-01 ENCOUNTER — TELEPHONE (OUTPATIENT)
Dept: FAMILY MEDICINE CLINIC | Age: 64
End: 2024-05-01

## 2024-05-01 NOTE — TELEPHONE ENCOUNTER
Theresa with [facility] is requesting the clearance form that she faxed yesterday 04/30/2024 be completed and sent back to her. Patient is scheduled for surgery on 05/06/2024.

## 2024-06-27 DIAGNOSIS — G90.521 COMPLEX REGIONAL PAIN SYNDROME I OF RIGHT LOWER LIMB: ICD-10-CM

## 2024-06-27 DIAGNOSIS — I10 ESSENTIAL HYPERTENSION: ICD-10-CM

## 2024-06-27 NOTE — TELEPHONE ENCOUNTER
DOMINGO - 03/22/2024  CONTRACT - 06/29/2023      PHARMACY called to request a refill on his medication.      Last office visit : 3/22/2024   Next office visit : Visit date not found     Requested Prescriptions     Pending Prescriptions Disp Refills    gabapentin (NEURONTIN) 300 MG capsule [Pharmacy Med Name: GABAPENTIN 300MG CAPS] 810 capsule 0     Sig: TAKE THREE CAPSULES BY MOUTH THREE TIMES DAILY . DAILY MAX AMOUNT: 2700MG    amLODIPine (NORVASC) 2.5 MG tablet [Pharmacy Med Name: AMLODIPINE 2.5MG TB] 150 tablet 0     Sig: TAKE ONE TABLET BY MOUTH EVERY MORNING            Juani Paige MA, CCMA

## 2024-06-28 RX ORDER — GABAPENTIN 300 MG/1
CAPSULE ORAL
Qty: 810 CAPSULE | Refills: 0 | Status: SHIPPED | OUTPATIENT
Start: 2024-06-28 | End: 2024-09-26

## 2024-06-28 RX ORDER — AMLODIPINE BESYLATE 2.5 MG/1
2.5 TABLET ORAL EVERY MORNING
Qty: 150 TABLET | Refills: 0 | Status: SHIPPED | OUTPATIENT
Start: 2024-06-28

## 2024-07-12 ENCOUNTER — OFFICE VISIT (OUTPATIENT)
Dept: FAMILY MEDICINE CLINIC | Age: 64
End: 2024-07-12

## 2024-07-12 VITALS
DIASTOLIC BLOOD PRESSURE: 78 MMHG | BODY MASS INDEX: 35.68 KG/M2 | SYSTOLIC BLOOD PRESSURE: 128 MMHG | HEART RATE: 116 BPM | TEMPERATURE: 97.7 F | OXYGEN SATURATION: 95 % | RESPIRATION RATE: 20 BRPM | HEIGHT: 66 IN | WEIGHT: 222 LBS

## 2024-07-12 DIAGNOSIS — E78.2 MIXED HYPERLIPIDEMIA: ICD-10-CM

## 2024-07-12 DIAGNOSIS — Z96.652 STATUS POST TOTAL LEFT KNEE REPLACEMENT: ICD-10-CM

## 2024-07-12 DIAGNOSIS — Z12.5 PROSTATE CANCER SCREENING: ICD-10-CM

## 2024-07-12 DIAGNOSIS — R20.2 PARESTHESIA OF BOTH HANDS: ICD-10-CM

## 2024-07-12 DIAGNOSIS — Z80.9 FAMILY HISTORY OF CANCER: ICD-10-CM

## 2024-07-12 DIAGNOSIS — E87.6 HYPOKALEMIA: Primary | ICD-10-CM

## 2024-07-12 DIAGNOSIS — R73.01 IFG (IMPAIRED FASTING GLUCOSE): ICD-10-CM

## 2024-07-12 DIAGNOSIS — I10 ESSENTIAL HYPERTENSION: ICD-10-CM

## 2024-07-12 DIAGNOSIS — I10 PRIMARY HYPERTENSION: Primary | ICD-10-CM

## 2024-07-12 DIAGNOSIS — E55.9 VITAMIN D INSUFFICIENCY: ICD-10-CM

## 2024-07-12 DIAGNOSIS — Z87.891 PERSONAL HISTORY OF TOBACCO USE: ICD-10-CM

## 2024-07-12 LAB
25(OH)D3 SERPL-MCNC: 26.9 NG/ML
ALBUMIN SERPL-MCNC: 4.4 G/DL (ref 3.5–5.2)
ALP SERPL-CCNC: 96 U/L (ref 40–130)
ALT SERPL-CCNC: 22 U/L (ref 5–41)
ANION GAP SERPL CALCULATED.3IONS-SCNC: 14 MMOL/L (ref 7–19)
AST SERPL-CCNC: 19 U/L (ref 5–40)
BASOPHILS # BLD: 0.1 K/UL (ref 0–0.2)
BASOPHILS NFR BLD: 1 % (ref 0–1)
BILIRUB SERPL-MCNC: 0.5 MG/DL (ref 0.2–1.2)
BUN SERPL-MCNC: 18 MG/DL (ref 8–23)
CALCIUM SERPL-MCNC: 9.7 MG/DL (ref 8.8–10.2)
CHLORIDE SERPL-SCNC: 102 MMOL/L (ref 98–111)
CHOLEST SERPL-MCNC: 141 MG/DL (ref 160–199)
CO2 SERPL-SCNC: 25 MMOL/L (ref 22–29)
CREAT SERPL-MCNC: 0.7 MG/DL (ref 0.5–1.2)
EOSINOPHIL # BLD: 0.2 K/UL (ref 0–0.6)
EOSINOPHIL NFR BLD: 2.6 % (ref 0–5)
ERYTHROCYTE [DISTWIDTH] IN BLOOD BY AUTOMATED COUNT: 13.3 % (ref 11.5–14.5)
GLUCOSE SERPL-MCNC: 123 MG/DL (ref 74–109)
HBA1C MFR BLD: 6.8 % (ref 4–6)
HCT VFR BLD AUTO: 40.8 % (ref 42–52)
HDLC SERPL-MCNC: 41 MG/DL (ref 55–121)
HGB BLD-MCNC: 13.9 G/DL (ref 14–18)
IMM GRANULOCYTES # BLD: 0 K/UL
LDLC SERPL CALC-MCNC: 77 MG/DL
LYMPHOCYTES # BLD: 2.5 K/UL (ref 1.1–4.5)
LYMPHOCYTES NFR BLD: 33 % (ref 20–40)
MCH RBC QN AUTO: 31.6 PG (ref 27–31)
MCHC RBC AUTO-ENTMCNC: 34.1 G/DL (ref 33–37)
MCV RBC AUTO: 92.7 FL (ref 80–94)
MONOCYTES # BLD: 0.6 K/UL (ref 0–0.9)
MONOCYTES NFR BLD: 7.7 % (ref 0–10)
NEUTROPHILS # BLD: 4.2 K/UL (ref 1.5–7.5)
NEUTS SEG NFR BLD: 55.6 % (ref 50–65)
PLATELET # BLD AUTO: 212 K/UL (ref 130–400)
PMV BLD AUTO: 11.2 FL (ref 9.4–12.4)
POTASSIUM SERPL-SCNC: 3.1 MMOL/L (ref 3.5–5)
PROT SERPL-MCNC: 6.9 G/DL (ref 6.6–8.7)
PSA SERPL-MCNC: 0.87 NG/ML (ref 0–4)
RBC # BLD AUTO: 4.4 M/UL (ref 4.7–6.1)
SODIUM SERPL-SCNC: 141 MMOL/L (ref 136–145)
TRIGL SERPL-MCNC: 116 MG/DL (ref 0–149)
TSH SERPL DL<=0.005 MIU/L-ACNC: 0.68 UIU/ML (ref 0.27–4.2)
VIT B12 SERPL-MCNC: 773 PG/ML (ref 211–946)
WBC # BLD AUTO: 7.6 K/UL (ref 4.8–10.8)

## 2024-07-12 ASSESSMENT — ENCOUNTER SYMPTOMS: RESPIRATORY NEGATIVE: 1

## 2024-07-12 NOTE — PROGRESS NOTES
Behavior: Behavior normal.         Thought Content: Thought content normal.         Judgment: Judgment normal.         /78 (Site: Left Upper Arm, Position: Sitting, Cuff Size: Large Adult)   Pulse (!) 116   Temp 97.7 °F (36.5 °C) (Infrared)   Resp 20   Ht 1.676 m (5' 6\")   Wt 100.7 kg (222 lb)   SpO2 95%   BMI 35.83 kg/m²      ASSESSMENT:      ICD-10-CM    1. Primary hypertension  I10 Increase amlodipine back to 5mg daily      2. Status post total left knee replacement  Z96.652 Letter typed.      3. Family history of cancer  Z80.9 Genetic testing recommended and number given.      4. Personal history of tobacco use  Z87.891 GA VISIT TO DISCUSS LUNG CA SCREEN W LDCT     CT Lung Screen (Initial/Annual/Baseline)          PLAN:    Ty Salazar: Letter for School/Work (Patient needs a letter to return to work. Patient had left knee surgery on 05/06/2024 and Orthopedic doctor has also cleared him to return to work on 06/25/2024.)  Recheck BP   Increase amlodipine to 5mg daily and call home bp log in 2wks.   Reminder for fasting lab as previously entered.   Letter entered for return to work  Genetic testing request--Christa.   Diagnosis and orders for this visit are above.      Please note that this chart was generated using dragon dictationsoftware.  Although every effort was made to ensure the accuracy of this automated transcription, some errors in transcription may have occurred.      Discussed with the patient the current USPSTF guidelines released March 9, 2021 for screening for lung cancer.    For adults aged 50 to 80 years who have a 20 pack-year smoking history and currently smoke or have quit within the past 15 years the grade B recommendation is to:  Screen for lung cancer with low-dose computed tomography (LDCT) every year.  Stop screening once a person has not smoked for 15 years or has a health problem that limits life expectancy or the ability to have lung surgery.    The patient

## 2024-07-12 NOTE — PATIENT INSTRUCTIONS
Call home BP in approx 2wks of higher dose of amlodipine.         Learning About Lung Cancer Screening  What is screening for lung cancer?     Lung cancer screening is a way to find some lung cancers early, before a person has any symptoms of the cancer.  Lung cancer screening may help those who have the highest risk for lung cancer--people age 50 and older who are or were heavy smokers. For most people, who aren't at increased risk, screening for lung cancer probably isn't helpful.  Screening won't prevent cancer. And it may not find all lung cancers. Lung cancer screening may lower the risk of dying from lung cancer in a small number of people.  How is it done?  Lung cancer screening is done with a low-dose CT (computed tomography) scan. A CT scan uses X-rays, or radiation, to make detailed pictures of your body. Experts recommend that screening be done in medical centers that focus on finding and treating lung cancer.  Who is screening recommended for?  Lung cancer screening is recommended for people age 50 and older who are or were heavy smokers. That means people with a smoking history of at least 20 pack years. A pack year is a way to measure how heavy a smoker you are or were.  To figure out your pack years, multiply how many packs a day on average (assuming 20 cigarettes per pack) you have smoked by how many years you have smoked. For example:  If you smoked 1 pack a day for 20 years, that's 1 times 20. So you have a smoking history of 20 pack years.  If you smoked 2 packs a day for 10 years, that's 2 times 10. So you have a smoking history of 20 pack years.  Experts agree that screening is for people who have a high risk of lung cancer. But experts don't agree on what high risk means. Some say people age 50 or older with at least a 20-pack-year smoking history are high risk. Others say it's people age 55 or older with a 30-pack-year history.  To see if you could benefit from screening, first find out if you

## 2024-07-17 RX ORDER — POTASSIUM CHLORIDE 20 MEQ/1
20 TABLET, EXTENDED RELEASE ORAL DAILY
Qty: 30 TABLET | Refills: 0 | Status: SHIPPED | OUTPATIENT
Start: 2024-07-17

## 2024-07-22 ENCOUNTER — OFFICE VISIT (OUTPATIENT)
Dept: PRIMARY CARE CLINIC | Age: 64
End: 2024-07-22
Payer: COMMERCIAL

## 2024-07-22 VITALS
TEMPERATURE: 98.4 F | WEIGHT: 221 LBS | HEART RATE: 109 BPM | OXYGEN SATURATION: 96 % | SYSTOLIC BLOOD PRESSURE: 146 MMHG | RESPIRATION RATE: 16 BRPM | DIASTOLIC BLOOD PRESSURE: 82 MMHG | BODY MASS INDEX: 35.67 KG/M2

## 2024-07-22 DIAGNOSIS — I10 PRIMARY HYPERTENSION: Primary | ICD-10-CM

## 2024-07-22 DIAGNOSIS — H65.03 NON-RECURRENT ACUTE SEROUS OTITIS MEDIA OF BOTH EARS: ICD-10-CM

## 2024-07-22 DIAGNOSIS — H61.23 BILATERAL IMPACTED CERUMEN: ICD-10-CM

## 2024-07-22 PROCEDURE — 99214 OFFICE O/P EST MOD 30 MIN: CPT | Performed by: NURSE PRACTITIONER

## 2024-07-22 PROCEDURE — 3074F SYST BP LT 130 MM HG: CPT | Performed by: NURSE PRACTITIONER

## 2024-07-22 PROCEDURE — 3078F DIAST BP <80 MM HG: CPT | Performed by: NURSE PRACTITIONER

## 2024-07-22 PROCEDURE — 69209 REMOVE IMPACTED EAR WAX UNI: CPT | Performed by: NURSE PRACTITIONER

## 2024-07-22 ASSESSMENT — ENCOUNTER SYMPTOMS
COUGH: 0
SHORTNESS OF BREATH: 1
SINUS PRESSURE: 0
WHEEZING: 0
EYE ITCHING: 0
ABDOMINAL PAIN: 0
DIARRHEA: 0
SORE THROAT: 0
CONSTIPATION: 0
COLOR CHANGE: 0
NAUSEA: 0
EYE DISCHARGE: 0
VOMITING: 0
APNEA: 1
BLOOD IN STOOL: 0
RHINORRHEA: 0

## 2024-07-22 NOTE — PROGRESS NOTES
BILATERAL EARS IRRIGATED WITH  ELEPHANT EAR IRRIGATION SYSTEM. MODERATE AMOUNT OF EAR WAX REMOVED WITH WATER AND CURAGE. PT TOLERATED WELL WITH PROVIDER RECHECKING EARS POST PROCEDURE    
Cigarettes     Start date:      Quit date: 2024     Years since quittin.4    Smokeless tobacco: Never   Substance Use Topics    Alcohol use: No        Current Outpatient Medications   Medication Sig Dispense Refill    potassium chloride (KLOR-CON M) 20 MEQ extended release tablet Take 1 tablet by mouth daily 30 tablet 0    gabapentin (NEURONTIN) 300 MG capsule TAKE THREE CAPSULES BY MOUTH THREE TIMES DAILY . DAILY MAX AMOUNT: 2700MG 810 capsule 0    amLODIPine (NORVASC) 2.5 MG tablet TAKE ONE TABLET BY MOUTH EVERY MORNING 150 tablet 0    chlorthalidone (HYGROTON) 25 MG tablet Take 1 tablet by mouth daily 90 tablet 1    olmesartan (BENICAR) 40 MG tablet TAKE 1 TABLET BY MOUTH EVERY DAY IN THE MORNING 90 tablet 1    atorvastatin (LIPITOR) 40 MG tablet TAKE 1/2 TABLET BY MOUTH EVERY DAY 45 tablet 7    Blood Pressure KIT Check bp 3x/wk   Dx-htn 1 kit 0    Multiple Vitamins-Minerals (CENTRUM SILVER 50+MEN) TABS Take by mouth      acetaminophen (TYLENOL 8 HOUR ARTHRITIS PAIN) 650 MG extended release tablet Take 1 tablet by mouth every 8 hours as needed for Pain (Patient taking differently: Take 2 tablets by mouth three times daily) 60 tablet 0     No current facility-administered medications for this visit.       Allergies   Allergen Reactions    Penicillins Hives     Other reaction(s): Other (see comments)  Patient states he passed out the last time he took penicillin        Health Maintenance   Topic Date Due    DTaP/Tdap/Td vaccine (1 - Tdap) Never done    Shingles vaccine (1 of 2) Never done    Lung Cancer Screening &/or Counseling  2019    COVID-19 Vaccine ( - -24 season) 2023    Flu vaccine (1) 2024    A1C test (Diabetic or Prediabetic)  10/12/2024    Depression Screen  2025    Lipids  2025    Colorectal Cancer Screen  2028    Respiratory Syncytial Virus (RSV) Pregnant or age 60 yrs+  Completed    Hepatitis C screen  Completed    HIV screen  Completed    Hepatitis

## 2024-07-22 NOTE — PATIENT INSTRUCTIONS
-Monitor blood pressure at home and log.   -Encouraged patient to sleep on his side or elevated.  -Encouraged flonase BID and zyrtec daily  -Follow up with PCP tomorrow as scheduled.

## 2024-07-23 ENCOUNTER — OFFICE VISIT (OUTPATIENT)
Dept: FAMILY MEDICINE CLINIC | Age: 64
End: 2024-07-23
Payer: COMMERCIAL

## 2024-07-23 VITALS
DIASTOLIC BLOOD PRESSURE: 70 MMHG | BODY MASS INDEX: 35.52 KG/M2 | OXYGEN SATURATION: 98 % | SYSTOLIC BLOOD PRESSURE: 140 MMHG | WEIGHT: 221 LBS | HEIGHT: 66 IN | TEMPERATURE: 97.7 F | RESPIRATION RATE: 20 BRPM | HEART RATE: 95 BPM

## 2024-07-23 DIAGNOSIS — R07.9 CHEST PAIN, UNSPECIFIED TYPE: ICD-10-CM

## 2024-07-23 DIAGNOSIS — I10 ESSENTIAL HYPERTENSION: ICD-10-CM

## 2024-07-23 DIAGNOSIS — G47.19 EXCESSIVE DAYTIME SLEEPINESS: ICD-10-CM

## 2024-07-23 DIAGNOSIS — E11.9 TYPE 2 DIABETES, DIET CONTROLLED (HCC): ICD-10-CM

## 2024-07-23 DIAGNOSIS — R42 VERTIGO: ICD-10-CM

## 2024-07-23 DIAGNOSIS — E55.9 VITAMIN D INSUFFICIENCY: ICD-10-CM

## 2024-07-23 DIAGNOSIS — R06.83 SNORING: Primary | ICD-10-CM

## 2024-07-23 PROCEDURE — 99214 OFFICE O/P EST MOD 30 MIN: CPT | Performed by: NURSE PRACTITIONER

## 2024-07-23 PROCEDURE — 93000 ELECTROCARDIOGRAM COMPLETE: CPT | Performed by: NURSE PRACTITIONER

## 2024-07-23 PROCEDURE — 3078F DIAST BP <80 MM HG: CPT | Performed by: NURSE PRACTITIONER

## 2024-07-23 PROCEDURE — 3075F SYST BP GE 130 - 139MM HG: CPT | Performed by: NURSE PRACTITIONER

## 2024-07-23 PROCEDURE — 3044F HG A1C LEVEL LT 7.0%: CPT | Performed by: NURSE PRACTITIONER

## 2024-07-23 RX ORDER — BLOOD-GLUCOSE METER
1 KIT MISCELLANEOUS DAILY PRN
Qty: 1 KIT | Refills: 0 | Status: SHIPPED | OUTPATIENT
Start: 2024-07-23

## 2024-07-23 RX ORDER — GLUCOSAMINE HCL/CHONDROITIN SU 500-400 MG
CAPSULE ORAL
Qty: 100 STRIP | Refills: 3 | Status: SHIPPED | OUTPATIENT
Start: 2024-07-23

## 2024-07-23 RX ORDER — LANCETS 30 GAUGE
1 EACH MISCELLANEOUS DAILY
Qty: 100 EACH | Refills: 3 | Status: SHIPPED | OUTPATIENT
Start: 2024-07-23

## 2024-07-23 RX ORDER — AMLODIPINE BESYLATE 10 MG/1
10 TABLET ORAL EVERY MORNING
Qty: 30 TABLET | Refills: 2 | Status: SHIPPED | OUTPATIENT
Start: 2024-07-23

## 2024-07-23 RX ORDER — MECLIZINE HYDROCHLORIDE 25 MG/1
25 TABLET ORAL 3 TIMES DAILY PRN
Qty: 60 TABLET | Refills: 0 | Status: SHIPPED | OUTPATIENT
Start: 2024-07-23 | End: 2024-08-02

## 2024-07-23 NOTE — PROGRESS NOTES
entered   Recheck BP  EKG--reviewed and NSR noted.    Start metformin as discussed in detail  Glucose  monitoring at least 3x/wk and if feeling ill in any way.  Lab due in 3mo with ov.    Diagnosis and orders for this visit are above.      Please note that this chart was generated using dragon dictationsoftware.  Although every effort was made to ensure the accuracy of this automated transcription, some errors in transcription may have occurred.

## 2024-07-26 PROBLEM — E11.9 TYPE 2 DIABETES, DIET CONTROLLED (HCC): Status: ACTIVE | Noted: 2024-07-26

## 2024-07-26 PROBLEM — E55.9 VITAMIN D INSUFFICIENCY: Status: ACTIVE | Noted: 2024-07-26

## 2024-07-26 ASSESSMENT — ENCOUNTER SYMPTOMS: RESPIRATORY NEGATIVE: 1

## 2024-07-30 ENCOUNTER — TELEPHONE (OUTPATIENT)
Dept: FAMILY MEDICINE CLINIC | Age: 64
End: 2024-07-30

## 2024-07-30 NOTE — TELEPHONE ENCOUNTER
Patient states that he has to take the Meclizine around the clock and he is still having break through dizziness is still effecting him. He wants to know if its still supposed to be effecting him this long?

## 2024-07-31 DIAGNOSIS — R42 VERTIGO: Primary | ICD-10-CM

## 2024-07-31 NOTE — TELEPHONE ENCOUNTER
It can.  However, with it continuing, I would prefer to start a vestibular therapy referral.  He would also need to follow up to verify BP is healthy with discussed changes at last visit.  I am entering that now.

## 2024-07-31 NOTE — TELEPHONE ENCOUNTER
I sent patient the providers reply in a [employer] message since he checks this regularly   Patient states he did not take any drugs, but did drink last night.  He states he has taken meds before, but not last night.

## 2024-08-05 ENCOUNTER — TELEPHONE (OUTPATIENT)
Dept: FAMILY MEDICINE CLINIC | Age: 64
End: 2024-08-05

## 2024-08-05 NOTE — TELEPHONE ENCOUNTER
[facility] called today and stated Ty was felling  much better and will not be needing the Vestibular treatments. He declined the therapy.

## 2024-08-08 DIAGNOSIS — G90.521 COMPLEX REGIONAL PAIN SYNDROME I OF RIGHT LOWER LIMB: ICD-10-CM

## 2024-08-08 DIAGNOSIS — E78.2 MIXED HYPERLIPIDEMIA: ICD-10-CM

## 2024-08-08 DIAGNOSIS — E11.9 TYPE 2 DIABETES, DIET CONTROLLED (HCC): ICD-10-CM

## 2024-08-08 DIAGNOSIS — I10 ESSENTIAL HYPERTENSION: ICD-10-CM

## 2024-08-08 RX ORDER — POTASSIUM CHLORIDE 20 MEQ/1
20 TABLET, EXTENDED RELEASE ORAL DAILY
Qty: 30 TABLET | Refills: 0 | Status: SHIPPED | OUTPATIENT
Start: 2024-08-08

## 2024-08-08 RX ORDER — AMLODIPINE BESYLATE 10 MG/1
10 TABLET ORAL EVERY MORNING
Qty: 30 TABLET | Refills: 2 | Status: SHIPPED | OUTPATIENT
Start: 2024-08-08

## 2024-08-08 RX ORDER — ATORVASTATIN CALCIUM 40 MG/1
TABLET, FILM COATED ORAL
Qty: 45 TABLET | Refills: 7 | Status: SHIPPED | OUTPATIENT
Start: 2024-08-08

## 2024-08-08 RX ORDER — CHLORTHALIDONE 25 MG/1
25 TABLET ORAL DAILY
Qty: 90 TABLET | Refills: 1 | Status: SHIPPED | OUTPATIENT
Start: 2024-08-08

## 2024-08-08 NOTE — TELEPHONE ENCOUNTER
Ty Martin called to request a refill on his medication.      Last office visit : 7/23/2024   Next office visit : Visit date not found     Requested Prescriptions     Pending Prescriptions Disp Refills    atorvastatin (LIPITOR) 40 MG tablet 45 tablet 7     Sig: TAKE 1/2 TABLET BY MOUTH EVERY DAY    chlorthalidone (HYGROTON) 25 MG tablet 90 tablet 1     Sig: Take 1 tablet by mouth daily    gabapentin (NEURONTIN) 300 MG capsule 810 capsule 0    potassium chloride (KLOR-CON M) 20 MEQ extended release tablet 30 tablet 0     Sig: Take 1 tablet by mouth daily    amLODIPine (NORVASC) 10 MG tablet 30 tablet 2     Sig: Take 1 tablet by mouth every morning            Ania Dennison LPN

## 2024-08-08 NOTE — TELEPHONE ENCOUNTER
Ty Salazar called to request a refill on his medication.      Last office visit : 2024   Next office visit : 2024     Requested Prescriptions     Pending Prescriptions Disp Refills    metFORMIN (GLUCOPHAGE) 500 MG tablet 60 tablet 5     Si po daily x 1wk then increase to 1 po bid TAKE WITH FOOD            Ania Dennison LPN

## 2024-08-08 NOTE — TELEPHONE ENCOUNTER
Ty Salazar called to request a refill on his medication.      Last office visit : 7/23/2024   Next office visit : Visit date not found     Last UDS:   Benzodiazepine Screen, Urine   Date Value Ref Range Status   03/01/2023 negative  Final     Buprenorphine Urine   Date Value Ref Range Status   03/01/2023 negative  Final     Cocaine Metabolite Screen, Urine   Date Value Ref Range Status   03/01/2023 negative  Final     Gabapentin Screen, Urine   Date Value Ref Range Status   03/01/2023 not tested  Final     Oxycodone Screen, Ur   Date Value Ref Range Status   03/01/2023 negative  Final     Propoxyphene Screen, Urine   Date Value Ref Range Status   03/01/2023 not tested  Final     THC Screen, Urine   Date Value Ref Range Status   03/01/2023 negative  Final     Tricyclic Antidepressants, Urine   Date Value Ref Range Status   03/01/2023 negative  Final       Last Oswaldo: 8/8/2024  Medication Contract: 6/29/2023   Last Fill: 7/28/2024    Requested Prescriptions     Pending Prescriptions Disp Refills    gabapentin (NEURONTIN) 300 MG capsule 810 capsule 0     Signed Prescriptions Disp Refills    atorvastatin (LIPITOR) 40 MG tablet 45 tablet 7     Sig: TAKE 1/2 TABLET BY MOUTH EVERY DAY     Authorizing Provider: BALDEV GOMEZ     Ordering User: ANNALISA PHAM    chlorthalidone (HYGROTON) 25 MG tablet 90 tablet 1     Sig: Take 1 tablet by mouth daily     Authorizing Provider: BALDEV GOMEZ     Ordering User: ANNALISA PHAM    potassium chloride (KLOR-CON M) 20 MEQ extended release tablet 30 tablet 0     Sig: Take 1 tablet by mouth daily     Authorizing Provider: BALDEV GOMEZ     Ordering User: ANNALISA PHAM    amLODIPine (NORVASC) 10 MG tablet 30 tablet 2     Sig: Take 1 tablet by mouth every morning     Authorizing Provider: BALDEV GOMEZ     Ordering User: ANNALISA PHAM         Please approve or refuse this medication.   Annalisa Pham LPN

## 2024-08-09 NOTE — TELEPHONE ENCOUNTER
Please check with pharmacy.  I am seeing that I sent this at the end of June for 3mo.  Seems to not be due.

## 2024-08-12 NOTE — TELEPHONE ENCOUNTER
Ok, thank you.  I will send the gabapentin on Weds when back in office IF that is the new plan and lyrica is not being taken.

## 2024-08-12 NOTE — TELEPHONE ENCOUNTER
Pharmacy states he has not gotten the Gabapentin since March 2024. I did run a Oswaldo and did see Pregabalin by another provider in [place]. Please look at that Oswaldo on or around 7/1/2024

## 2024-08-12 NOTE — TELEPHONE ENCOUNTER
Please contact him.  I have documented from our last visit that Anthony had moved him to qid gabapentin and then reduced to 3 instead of 4.  He didn't mention Lyrica at that time (or not that I had documented).  I need to know which he is now taking? There are only very rare occasions that the two are used together.

## 2024-08-14 NOTE — TELEPHONE ENCOUNTER
Unable to reach this patient by phone regarding the Neurontin and Lyrica. Message left for him to call back As soon as he gets the message.

## 2024-08-14 NOTE — TELEPHONE ENCOUNTER
Thank you for continuing to try.  I am concerned they made a change and I do not need to send this.  I will hold off for now.  If his spouse is listed on HIPAA, can you try to touch base with her? She may be able to contact him [place].

## 2024-08-19 RX ORDER — GABAPENTIN 300 MG/1
CAPSULE ORAL
Qty: 810 CAPSULE | Refills: 0 | Status: SHIPPED | OUTPATIENT
Start: 2024-08-19 | End: 2024-11-08

## 2024-08-19 NOTE — TELEPHONE ENCOUNTER
Ty Salazar called to request a refill on his medication.      Last office visit : 7/23/2024   Next office visit : Visit date not found     Last UDS:   Benzodiazepine Screen, Urine   Date Value Ref Range Status   03/01/2023 negative  Final     Buprenorphine Urine   Date Value Ref Range Status   03/01/2023 negative  Final     Cocaine Metabolite Screen, Urine   Date Value Ref Range Status   03/01/2023 negative  Final     Gabapentin Screen, Urine   Date Value Ref Range Status   03/01/2023 not tested  Final     Oxycodone Screen, Ur   Date Value Ref Range Status   03/01/2023 negative  Final     Propoxyphene Screen, Urine   Date Value Ref Range Status   03/01/2023 not tested  Final     THC Screen, Urine   Date Value Ref Range Status   03/01/2023 negative  Final     Tricyclic Antidepressants, Urine   Date Value Ref Range Status   03/01/2023 negative  Final       Last Oswaldo: 8/19/2024  Medication Contract: NEEDS MED CONTRACT   Last Fill: 6/28/2024    Requested Prescriptions     Pending Prescriptions Disp Refills    gabapentin (NEURONTIN) 300 MG capsule 810 capsule 0     Sig: TAKE THREE CAPSULES BY MOUTH THREE TIMES DAILY . DAILY MAX AMOUNT: 2700MG     Signed Prescriptions Disp Refills    atorvastatin (LIPITOR) 40 MG tablet 45 tablet 7     Sig: TAKE 1/2 TABLET BY MOUTH EVERY DAY     Authorizing Provider: BALDEV GOMEZ     Ordering User: ANNALISA PHAM    chlorthalidone (HYGROTON) 25 MG tablet 90 tablet 1     Sig: Take 1 tablet by mouth daily     Authorizing Provider: BALDEV GOMEZ     Ordering User: ANNALISA PHAM    potassium chloride (KLOR-CON M) 20 MEQ extended release tablet 30 tablet 0     Sig: Take 1 tablet by mouth daily     Authorizing Provider: BALDEV GOMEZ     Ordering User: ANNALISA PHAM    amLODIPine (NORVASC) 10 MG tablet 30 tablet 2     Sig: Take 1 tablet by mouth every morning     Authorizing Provider: BALDEV GOMEZ     Ordering User: ANNALISA PHAM         Please approve or refuse this medication.

## 2024-08-23 DIAGNOSIS — I10 ESSENTIAL HYPERTENSION: ICD-10-CM

## 2024-08-23 RX ORDER — OLMESARTAN MEDOXOMIL 40 MG/1
40 TABLET ORAL EVERY MORNING
Qty: 30 TABLET | Refills: 0 | Status: SHIPPED | OUTPATIENT
Start: 2024-08-23

## 2024-08-29 DIAGNOSIS — G90.521 COMPLEX REGIONAL PAIN SYNDROME I OF RIGHT LOWER LIMB: ICD-10-CM

## 2024-08-29 DIAGNOSIS — E11.9 TYPE 2 DIABETES, DIET CONTROLLED (HCC): ICD-10-CM

## 2024-08-29 RX ORDER — GABAPENTIN 300 MG/1
CAPSULE ORAL
Qty: 810 CAPSULE | Refills: 0 | OUTPATIENT
Start: 2024-08-29 | End: 2024-11-18

## 2024-08-29 NOTE — TELEPHONE ENCOUNTER
Ty Salazar called to request a refill on his medication.      Last office visit : 2024   Next office visit : 2024     Requested Prescriptions     Pending Prescriptions Disp Refills    metFORMIN (GLUCOPHAGE) 500 MG tablet 60 tablet 5     Si po daily x 1wk then increase to 1 po bid TAKE WITH FOOD    gabapentin (NEURONTIN) 300 MG capsule 810 capsule 0     Sig: TAKE THREE CAPSULES BY MOUTH THREE TIMES DAILY . DAILY MAX AMOUNT: 2700MG            Ania Dennison LPN

## 2024-09-10 DIAGNOSIS — E78.2 MIXED HYPERLIPIDEMIA: ICD-10-CM

## 2024-09-10 DIAGNOSIS — E11.9 TYPE 2 DIABETES, DIET CONTROLLED (HCC): ICD-10-CM

## 2024-09-10 DIAGNOSIS — G90.521 COMPLEX REGIONAL PAIN SYNDROME I OF RIGHT LOWER LIMB: ICD-10-CM

## 2024-09-10 DIAGNOSIS — I10 ESSENTIAL HYPERTENSION: ICD-10-CM

## 2024-09-10 RX ORDER — ATORVASTATIN CALCIUM 40 MG/1
TABLET, FILM COATED ORAL
Qty: 45 TABLET | Refills: 7 | Status: SHIPPED | OUTPATIENT
Start: 2024-09-10

## 2024-09-10 RX ORDER — AMLODIPINE BESYLATE 10 MG/1
10 TABLET ORAL EVERY MORNING
Qty: 30 TABLET | Refills: 2 | Status: SHIPPED | OUTPATIENT
Start: 2024-09-10 | End: 2024-09-10

## 2024-09-10 RX ORDER — CHLORTHALIDONE 25 MG/1
25 TABLET ORAL DAILY
Qty: 90 TABLET | Refills: 1 | Status: SHIPPED | OUTPATIENT
Start: 2024-09-10

## 2024-09-10 RX ORDER — AMLODIPINE BESYLATE 10 MG/1
10 TABLET ORAL EVERY MORNING
Qty: 90 TABLET | Refills: 1 | Status: SHIPPED | OUTPATIENT
Start: 2024-09-10

## 2024-09-11 RX ORDER — GABAPENTIN 300 MG/1
CAPSULE ORAL
Qty: 810 CAPSULE | Refills: 0 | Status: SHIPPED | OUTPATIENT
Start: 2024-09-11 | End: 2024-11-30

## 2024-09-12 DIAGNOSIS — E78.2 MIXED HYPERLIPIDEMIA: ICD-10-CM

## 2024-09-12 RX ORDER — ATORVASTATIN CALCIUM 40 MG/1
TABLET, FILM COATED ORAL
Qty: 45 TABLET | Refills: 6 | OUTPATIENT
Start: 2024-09-12

## 2024-09-16 ENCOUNTER — HOSPITAL ENCOUNTER (OUTPATIENT)
Dept: SLEEP CENTER | Age: 64
Discharge: HOME | End: 2024-09-18
Payer: COMMERCIAL

## 2024-09-16 DIAGNOSIS — R06.83 SNORING: ICD-10-CM

## 2024-09-16 DIAGNOSIS — G47.19 EXCESSIVE DAYTIME SLEEPINESS: ICD-10-CM

## 2024-09-16 PROCEDURE — G0399 HOME SLEEP TEST/TYPE 3 PORTA: HCPCS

## 2024-09-17 PROCEDURE — G0399 HOME SLEEP TEST/TYPE 3 PORTA: HCPCS

## 2024-09-21 DIAGNOSIS — G47.33 OSA (OBSTRUCTIVE SLEEP APNEA): Primary | ICD-10-CM

## 2024-09-23 ENCOUNTER — TELEPHONE (OUTPATIENT)
Dept: FAMILY MEDICINE CLINIC | Age: 64
End: 2024-09-23

## 2024-09-23 ENCOUNTER — OFFICE VISIT (OUTPATIENT)
Dept: FAMILY MEDICINE CLINIC | Age: 64
End: 2024-09-23
Payer: COMMERCIAL

## 2024-09-23 ENCOUNTER — FOLLOWUP TELEPHONE ENCOUNTER (OUTPATIENT)
Dept: SLEEP CENTER | Age: 64
End: 2024-09-23

## 2024-09-23 VITALS
BODY MASS INDEX: 33.43 KG/M2 | WEIGHT: 208 LBS | HEIGHT: 66 IN | DIASTOLIC BLOOD PRESSURE: 64 MMHG | OXYGEN SATURATION: 97 % | RESPIRATION RATE: 14 BRPM | TEMPERATURE: 98 F | SYSTOLIC BLOOD PRESSURE: 132 MMHG | HEART RATE: 103 BPM

## 2024-09-23 DIAGNOSIS — Z02.89 MEDICATION MANAGEMENT CONTRACT AGREEMENT: ICD-10-CM

## 2024-09-23 DIAGNOSIS — H81.10 BENIGN PAROXYSMAL POSITIONAL VERTIGO, UNSPECIFIED LATERALITY: ICD-10-CM

## 2024-09-23 DIAGNOSIS — H10.9 CONJUNCTIVITIS OF RIGHT EYE, UNSPECIFIED CONJUNCTIVITIS TYPE: ICD-10-CM

## 2024-09-23 DIAGNOSIS — G47.33 OSA (OBSTRUCTIVE SLEEP APNEA): ICD-10-CM

## 2024-09-23 DIAGNOSIS — H61.22 IMPACTED CERUMEN, LEFT EAR: Primary | ICD-10-CM

## 2024-09-23 LAB
ALCOHOL URINE: NORMAL
AMPHETAMINE SCREEN URINE: NORMAL
BARBITURATE SCREEN URINE: NEGATIVE
BENZODIAZEPINE SCREEN, URINE: NEGATIVE
BUPRENORPHINE URINE: NEGATIVE
COCAINE METABOLITE SCREEN URINE: NEGATIVE
FENTANYL SCREEN, URINE: NORMAL
GABAPENTIN SCREEN, URINE: NORMAL
MDMA, URINE: NEGATIVE
METHADONE SCREEN, URINE: NEGATIVE
METHAMPHETAMINE, URINE: NEGATIVE
OPIATE SCREEN URINE: NEGATIVE
OXYCODONE SCREEN URINE: NEGATIVE
PHENCYCLIDINE SCREEN URINE: NEGATIVE
PROPOXYPHENE SCREEN, URINE: NORMAL
SYNTHETIC CANNABINOIDS(K2) SCREEN, URINE: NORMAL
THC SCREEN, URINE: NEGATIVE
TRAMADOL SCREEN URINE: NORMAL
TRICYCLIC ANTIDEPRESSANTS, UR: NEGATIVE

## 2024-09-23 PROCEDURE — 3075F SYST BP GE 130 - 139MM HG: CPT | Performed by: NURSE PRACTITIONER

## 2024-09-23 PROCEDURE — 99214 OFFICE O/P EST MOD 30 MIN: CPT | Performed by: NURSE PRACTITIONER

## 2024-09-23 PROCEDURE — 80305 DRUG TEST PRSMV DIR OPT OBS: CPT | Performed by: NURSE PRACTITIONER

## 2024-09-23 PROCEDURE — 69209 REMOVE IMPACTED EAR WAX UNI: CPT | Performed by: NURSE PRACTITIONER

## 2024-09-23 PROCEDURE — 3078F DIAST BP <80 MM HG: CPT | Performed by: NURSE PRACTITIONER

## 2024-09-23 PROCEDURE — 1123F ACP DISCUSS/DSCN MKR DOCD: CPT | Performed by: NURSE PRACTITIONER

## 2024-09-23 RX ORDER — MECLIZINE HYDROCHLORIDE 25 MG/1
25 TABLET ORAL 3 TIMES DAILY PRN
Qty: 90 TABLET | Refills: 0 | Status: SHIPPED | OUTPATIENT
Start: 2024-09-23 | End: 2024-10-23

## 2024-09-23 RX ORDER — POTASSIUM CHLORIDE 1500 MG/1
20 TABLET, EXTENDED RELEASE ORAL DAILY
Qty: 90 TABLET | Refills: 1 | Status: SHIPPED | OUTPATIENT
Start: 2024-09-23

## 2024-09-23 RX ORDER — TOBRAMYCIN 3 MG/ML
1 SOLUTION/ DROPS OPHTHALMIC EVERY 6 HOURS
Qty: 5 ML | Refills: 0 | Status: SHIPPED | OUTPATIENT
Start: 2024-09-23 | End: 2024-10-03

## 2024-09-23 ASSESSMENT — ENCOUNTER SYMPTOMS
EYE DISCHARGE: 1
RESPIRATORY NEGATIVE: 1

## 2024-09-24 ENCOUNTER — TELEPHONE (OUTPATIENT)
Dept: FAMILY MEDICINE CLINIC | Age: 64
End: 2024-09-24

## 2024-10-10 DIAGNOSIS — I10 ESSENTIAL HYPERTENSION: ICD-10-CM

## 2024-10-10 RX ORDER — OLMESARTAN MEDOXOMIL 40 MG/1
40 TABLET ORAL EVERY MORNING
Qty: 30 TABLET | Refills: 0 | Status: SHIPPED | OUTPATIENT
Start: 2024-10-10

## 2024-10-10 NOTE — TELEPHONE ENCOUNTER
Ty Salazar called to request a refill on his medication.      Last office visit : 9/23/2024   Next office visit : Visit date not found     Requested Prescriptions     Pending Prescriptions Disp Refills    olmesartan (BENICAR) 40 MG tablet [Pharmacy Med Name: OLMESARTAN 40MG TB] 30 tablet 0     Sig: TAKE ONE TABLET BY MOUTH EVERY MORNING            Ania Dennison LPN

## 2024-10-25 DIAGNOSIS — E11.9 TYPE 2 DIABETES, DIET CONTROLLED (HCC): ICD-10-CM

## 2024-10-25 RX ORDER — GLUCOSAMINE HCL/CHONDROITIN SU 500-400 MG
CAPSULE ORAL
Qty: 100 STRIP | Refills: 3 | Status: CANCELLED | OUTPATIENT
Start: 2024-10-25

## 2024-10-28 DIAGNOSIS — E11.9 TYPE 2 DIABETES, DIET CONTROLLED (HCC): ICD-10-CM

## 2024-10-28 RX ORDER — BLOOD-GLUCOSE METER
1 KIT MISCELLANEOUS DAILY PRN
Qty: 1 KIT | Refills: 0 | Status: SHIPPED | OUTPATIENT
Start: 2024-10-28

## 2024-10-28 RX ORDER — GLUCOSAMINE HCL/CHONDROITIN SU 500-400 MG
CAPSULE ORAL
Qty: 100 STRIP | Refills: 3 | Status: SHIPPED | OUTPATIENT
Start: 2024-10-28

## 2024-10-28 NOTE — TELEPHONE ENCOUNTER
Ty Salazar called to request a refill on his medication.      Last office visit : 2024   Next office visit : Visit date not found     Requested Prescriptions     Pending Prescriptions Disp Refills    blood glucose monitor strips 100 strip 3     Sig: For daily glucose testing Dispense brand per patient request or insurance benefits.    glucose monitoring kit 1 kit 0     Si kit by Does not apply route daily as needed (glucose monitoring) Use daily as directed            Alena Antunez CMA

## 2024-11-09 DIAGNOSIS — I10 ESSENTIAL HYPERTENSION: ICD-10-CM

## 2024-11-11 RX ORDER — OLMESARTAN MEDOXOMIL 40 MG/1
40 TABLET ORAL EVERY MORNING
Qty: 30 TABLET | Refills: 0 | Status: SHIPPED | OUTPATIENT
Start: 2024-11-11

## 2024-11-22 DIAGNOSIS — E78.2 MIXED HYPERLIPIDEMIA: ICD-10-CM

## 2024-11-24 DIAGNOSIS — G90.521 COMPLEX REGIONAL PAIN SYNDROME I OF RIGHT LOWER LIMB: ICD-10-CM

## 2024-11-24 DIAGNOSIS — I10 ESSENTIAL HYPERTENSION: ICD-10-CM

## 2024-11-25 RX ORDER — ATORVASTATIN CALCIUM 40 MG/1
TABLET, FILM COATED ORAL
Qty: 45 TABLET | Refills: 3 | Status: SHIPPED | OUTPATIENT
Start: 2024-11-25

## 2024-11-25 RX ORDER — AMLODIPINE BESYLATE 10 MG/1
10 TABLET ORAL EVERY MORNING
Qty: 30 TABLET | Refills: 2 | Status: SHIPPED | OUTPATIENT
Start: 2024-11-25

## 2024-11-25 RX ORDER — GABAPENTIN 300 MG/1
CAPSULE ORAL
Qty: 810 CAPSULE | Refills: 0 | Status: SHIPPED | OUTPATIENT
Start: 2024-11-25 | End: 2025-02-25

## 2024-11-25 NOTE — TELEPHONE ENCOUNTER
I am going to go ahead and sign off related to the holiday week, but his last gabapentin didn't show on the domingo.  I know he didn't like the lyrica (per his statement) and prefers the gabapentin.  Please recheck a DOMINGO with [place] (if possible) on it.

## 2024-11-25 NOTE — TELEPHONE ENCOUNTER
Ty Salazar called to request a refill on his medication.      Last office visit : 9/23/2024   Next office visit : Visit date not found     Last UDS:   Benzodiazepine Screen, Urine   Date Value Ref Range Status   09/23/2024 negative  Final     Buprenorphine Urine   Date Value Ref Range Status   09/23/2024 negative  Final     Cocaine Metabolite Screen, Urine   Date Value Ref Range Status   09/23/2024 negative  Final     Gabapentin Screen, Urine   Date Value Ref Range Status   03/01/2023 not tested  Final     Oxycodone Screen, Ur   Date Value Ref Range Status   09/23/2024 negative  Final     Propoxyphene Screen, Urine   Date Value Ref Range Status   03/01/2023 not tested  Final     THC Screen, Urine   Date Value Ref Range Status   09/23/2024 negative  Final     Tricyclic Antidepressants, Urine   Date Value Ref Range Status   09/23/2024 negative  Final       Last Oswaldo: 11/25/2024  Medication Contract: 9/24/2024   Last Fill: 9/11/2024    Requested Prescriptions     Pending Prescriptions Disp Refills    amLODIPine (NORVASC) 10 MG tablet [Pharmacy Med Name: AMLODIPINE 10MG TB] 30 tablet 2     Sig: TAKE ONE TABLET BY MOUTH EVERY MORNING    gabapentin (NEURONTIN) 300 MG capsule [Pharmacy Med Name: GABAPENTIN 300MG CAPS] 810 capsule 0     Sig: TAKE THREE CAPSULES BY MOUTH THREE TIMES DAILY . DAILY MAX AMOUNT: 2700MG         Please approve or refuse this medication.   Ania Dennison LPN

## 2024-11-25 NOTE — TELEPHONE ENCOUNTER
Ty Salazar called to request a refill on his medication.      Last office visit : 9/23/2024   Next office visit : 11/24/2024     Requested Prescriptions     Pending Prescriptions Disp Refills    atorvastatin (LIPITOR) 40 MG tablet 45 tablet 7     Sig: TAKE 1/2 TABLET BY MOUTH EVERY DAY            Alena Evans MA

## 2024-12-13 DIAGNOSIS — I10 ESSENTIAL HYPERTENSION: ICD-10-CM

## 2024-12-13 RX ORDER — OLMESARTAN MEDOXOMIL 40 MG/1
40 TABLET ORAL EVERY MORNING
Qty: 30 TABLET | Refills: 0 | Status: SHIPPED | OUTPATIENT
Start: 2024-12-13

## 2024-12-13 NOTE — TELEPHONE ENCOUNTER
Ty Salazar called to request a refill on his medication.      Last office visit : 9/23/2024   Next office visit : Visit date not found     Requested Prescriptions     Pending Prescriptions Disp Refills    olmesartan (BENICAR) 40 MG tablet [Pharmacy Med Name: OLMESARTAN 40MG TAB] 30 tablet 0     Sig: TAKE ONE TABLET BY MOUTH EVERY MORNING            Ania Dennison LPN

## 2025-01-03 ENCOUNTER — OFFICE VISIT (OUTPATIENT)
Dept: FAMILY MEDICINE CLINIC | Age: 65
End: 2025-01-03
Payer: COMMERCIAL

## 2025-01-03 VITALS
BODY MASS INDEX: 37.7 KG/M2 | TEMPERATURE: 98 F | SYSTOLIC BLOOD PRESSURE: 164 MMHG | WEIGHT: 234.6 LBS | HEIGHT: 66 IN | DIASTOLIC BLOOD PRESSURE: 74 MMHG | OXYGEN SATURATION: 97 % | RESPIRATION RATE: 18 BRPM | HEART RATE: 99 BPM

## 2025-01-03 DIAGNOSIS — G47.33 OSA (OBSTRUCTIVE SLEEP APNEA): ICD-10-CM

## 2025-01-03 DIAGNOSIS — Z82.0 FAMILY HISTORY OF ALZHEIMER DISEASE: ICD-10-CM

## 2025-01-03 DIAGNOSIS — I10 ESSENTIAL HYPERTENSION: ICD-10-CM

## 2025-01-03 DIAGNOSIS — E78.2 MIXED HYPERLIPIDEMIA: ICD-10-CM

## 2025-01-03 DIAGNOSIS — R42 VERTIGO: ICD-10-CM

## 2025-01-03 DIAGNOSIS — R41.3 MEMORY CHANGES: ICD-10-CM

## 2025-01-03 DIAGNOSIS — E11.9 TYPE 2 DIABETES MELLITUS WITHOUT COMPLICATION, WITHOUT LONG-TERM CURRENT USE OF INSULIN (HCC): Primary | ICD-10-CM

## 2025-01-03 DIAGNOSIS — E55.9 VITAMIN D INSUFFICIENCY: ICD-10-CM

## 2025-01-03 DIAGNOSIS — Z13.6 SCREENING FOR AAA (ABDOMINAL AORTIC ANEURYSM): ICD-10-CM

## 2025-01-03 DIAGNOSIS — L60.0 INGROWN NAIL: ICD-10-CM

## 2025-01-03 PROCEDURE — 3078F DIAST BP <80 MM HG: CPT | Performed by: NURSE PRACTITIONER

## 2025-01-03 PROCEDURE — 3077F SYST BP >= 140 MM HG: CPT | Performed by: NURSE PRACTITIONER

## 2025-01-03 PROCEDURE — 1123F ACP DISCUSS/DSCN MKR DOCD: CPT | Performed by: NURSE PRACTITIONER

## 2025-01-03 PROCEDURE — 99214 OFFICE O/P EST MOD 30 MIN: CPT | Performed by: NURSE PRACTITIONER

## 2025-01-03 RX ORDER — TERAZOSIN 1 MG/1
1 CAPSULE ORAL NIGHTLY
Qty: 30 CAPSULE | Refills: 3 | Status: SHIPPED | OUTPATIENT
Start: 2025-01-03

## 2025-01-03 RX ORDER — MECLIZINE HYDROCHLORIDE 25 MG/1
25 TABLET ORAL 3 TIMES DAILY PRN
Qty: 60 TABLET | Refills: 1 | Status: SHIPPED | OUTPATIENT
Start: 2025-01-03 | End: 2025-01-13

## 2025-01-03 ASSESSMENT — PATIENT HEALTH QUESTIONNAIRE - PHQ9
SUM OF ALL RESPONSES TO PHQ QUESTIONS 1-9: 0
SUM OF ALL RESPONSES TO PHQ9 QUESTIONS 1 & 2: 0
2. FEELING DOWN, DEPRESSED OR HOPELESS: NOT AT ALL
SUM OF ALL RESPONSES TO PHQ QUESTIONS 1-9: 0
1. LITTLE INTEREST OR PLEASURE IN DOING THINGS: NOT AT ALL
SUM OF ALL RESPONSES TO PHQ QUESTIONS 1-9: 0
SUM OF ALL RESPONSES TO PHQ QUESTIONS 1-9: 0

## 2025-01-03 ASSESSMENT — ENCOUNTER SYMPTOMS
RESPIRATORY NEGATIVE: 1
BACK PAIN: 1

## 2025-01-03 NOTE — PROGRESS NOTES
(Temporal)   Resp 18   Ht 1.676 m (5' 6\")   Wt 106.4 kg (234 lb 9.6 oz)   SpO2 97%   BMI 37.87 kg/m²      ASSESSMENT:      ICD-10-CM    1. Type 2 diabetes mellitus without complication, without long-term current use of insulin (HCC)  E11.9 Hemoglobin A1C     Albumin/Creatinine Ratio, Urine      2. Essential hypertension  I10 CBC with Auto Differential     Add terazosin (HYTRIN) 1 MG capsule      3. Mixed hyperlipidemia  E78.2 Comprehensive Metabolic Panel     TSH reflex to FT4,FT3     CBC with Auto Differential     Lipid Panel      4. CHARU (obstructive sleep apnea)  G47.33 Continue nightly use      5. Vitamin D insufficiency  E55.9 Vitamin D 25 Hydroxy      6. Ingrown nail  L60.0 External Referral To Podiatry      7. Screening for AAA (abdominal aortic aneurysm)  Z13.6 US SCREENING FOR AAA     CANCELED: US SCREENING FOR AAA      8. Memory changes  R41.3 MRI BRAIN W WO CONTRAST      9. Family history of Alzheimer disease  Z82.0 MRI BRAIN W WO CONTRAST      10. Vertigo  R42 meclizine (ANTIVERT) 25 MG tablet          PLAN:    Ty Salazar: Follow-up (Patient is here today for a follow up on his CPAP machine. He states he has had it 1 month and is needing documentation for insurance. ) and Referral - General (Patient is wanting a referral to a podiatrist for his ingrown toe nails. )  Fax [employer] the info related to cpap use.  Recheck bp  Lab entered  MRI ordered  Referral   Us aaa  Start terazosin at HS for BP and recheck bp visit in approx 4wks.   Diagnosis and orders for this visit are above.      Please note that this chart was generated using dragon dictationsoftware.  Although every effort was made to ensure the accuracy of this automated transcription, some errors in transcription may have occurred.

## 2025-01-09 DIAGNOSIS — I10 ESSENTIAL HYPERTENSION: ICD-10-CM

## 2025-01-09 NOTE — TELEPHONE ENCOUNTER
Last OV 1/3/2025  Next OV Visit date not found      Requested Prescriptions     Pending Prescriptions Disp Refills    olmesartan (BENICAR) 40 MG tablet [Pharmacy Med Name: OLMESARTAN 40MG TAB] 30 tablet 0     Sig: TAKE ONE TABLET BY MOUTH EVERY MORNING

## 2025-01-10 RX ORDER — OLMESARTAN MEDOXOMIL 40 MG/1
40 TABLET ORAL EVERY MORNING
Qty: 90 TABLET | Refills: 0 | Status: SHIPPED | OUTPATIENT
Start: 2025-01-10

## 2025-01-27 DIAGNOSIS — E11.9 TYPE 2 DIABETES, DIET CONTROLLED (HCC): ICD-10-CM

## 2025-01-27 NOTE — TELEPHONE ENCOUNTER
Ty Salazar called to request a refill on his medication.      Last office visit : 1/3/2025   Next office visit : Visit date not found     Requested Prescriptions     Pending Prescriptions Disp Refills    metFORMIN (GLUCOPHAGE) 500 MG tablet [Pharmacy Med Name: METFORMIN  MG TABLET] 180 tablet 1     Sig: TAKE 1 TABLET BY MOUTH EVERY DAY FOR 1 WEEK, THEN INCREASE TO TWICE A DAY WITH FOOD            Ania Dennison LPN

## 2025-02-23 DIAGNOSIS — G90.521 COMPLEX REGIONAL PAIN SYNDROME I OF RIGHT LOWER LIMB: ICD-10-CM

## 2025-02-24 DIAGNOSIS — E11.9 TYPE 2 DIABETES, DIET CONTROLLED (HCC): ICD-10-CM

## 2025-02-24 RX ORDER — GABAPENTIN 300 MG/1
CAPSULE ORAL
Qty: 810 CAPSULE | Refills: 0 | Status: SHIPPED | OUTPATIENT
Start: 2025-02-24 | End: 2025-03-24

## 2025-02-24 NOTE — TELEPHONE ENCOUNTER
Ty Salazar called to request a refill on his medication.      Last office visit : Visit date not found   Next office visit : Visit date not found     Requested Prescriptions     Pending Prescriptions Disp Refills    metFORMIN (GLUCOPHAGE) 500 MG tablet 180 tablet 1     Sig: TAKE 1 TABLET BY MOUTH EVERY DAY FOR 1 WEEK, THEN INCREASE TO TWICE A DAY WITH FOOD            Alena Antunez CMA

## 2025-02-24 NOTE — TELEPHONE ENCOUNTER
Ty Salazar called to request a refill on his medication.      Last office visit : 1/3/2025   Next office visit : 2/24/2025     Last UDS:   Benzodiazepine Screen, Urine   Date Value Ref Range Status   09/23/2024 negative  Final     Buprenorphine Urine   Date Value Ref Range Status   09/23/2024 negative  Final     Cocaine Metabolite Screen, Urine   Date Value Ref Range Status   09/23/2024 negative  Final     Gabapentin Screen, Urine   Date Value Ref Range Status   03/01/2023 not tested  Final     Oxycodone Screen, Ur   Date Value Ref Range Status   09/23/2024 negative  Final     Propoxyphene Screen, Urine   Date Value Ref Range Status   03/01/2023 not tested  Final     THC Screen, Urine   Date Value Ref Range Status   09/23/2024 negative  Final     Tricyclic Antidepressants, Urine   Date Value Ref Range Status   09/23/2024 negative  Final       Last Oswaldo: 2/24/2025  Medication Contract: 9/24/2024   Last Fill: 11/25/2024    Requested Prescriptions     Pending Prescriptions Disp Refills    gabapentin (NEURONTIN) 300 MG capsule [Pharmacy Med Name: GABAPENTIN 300MG CAPS] 810 capsule 0     Sig: TAKE THREE CAPSULES BY MOUTH THREE TIMES DAILY. MAX DAILY DOSE: 2700MG         Please approve or refuse this medication.   Ania Dennison LPN

## 2025-03-18 DIAGNOSIS — G90.521 COMPLEX REGIONAL PAIN SYNDROME I OF RIGHT LOWER LIMB: ICD-10-CM

## 2025-03-18 NOTE — TELEPHONE ENCOUNTER
Ty Salazar called to request a refill on his medication.      Last office visit : 1/3/2025   Next office visit : Visit date not found     Last UDS:   Benzodiazepine Screen, Urine   Date Value Ref Range Status   09/23/2024 negative  Final     Buprenorphine Urine   Date Value Ref Range Status   09/23/2024 negative  Final     Cocaine Metabolite Screen, Urine   Date Value Ref Range Status   09/23/2024 negative  Final     Gabapentin Screen, Urine   Date Value Ref Range Status   03/01/2023 not tested  Final     Oxycodone Screen, Ur   Date Value Ref Range Status   09/23/2024 negative  Final     Propoxyphene Screen, Urine   Date Value Ref Range Status   03/01/2023 not tested  Final     THC Screen, Urine   Date Value Ref Range Status   09/23/2024 negative  Final     Tricyclic Antidepressants, Urine   Date Value Ref Range Status   09/23/2024 negative  Final       Last Oswaldo: 2/27/2025  Medication Contract: 9/24/2024   Last Fill: 2/24/2025    Requested Prescriptions     Pending Prescriptions Disp Refills    gabapentin (NEURONTIN) 300 MG capsule 810 capsule 0     Sig: TAKE THREE CAPSULES BY MOUTH THREE TIMES DAILY. MAX DAILY DOSE: 2700MG         Please approve or refuse this medication.   Ania Dennison LPN

## 2025-03-19 RX ORDER — GABAPENTIN 300 MG/1
CAPSULE ORAL
Qty: 810 CAPSULE | Refills: 0 | OUTPATIENT
Start: 2025-03-19 | End: 2025-04-15

## 2025-03-20 DIAGNOSIS — E55.9 VITAMIN D INSUFFICIENCY: ICD-10-CM

## 2025-03-20 DIAGNOSIS — E78.2 MIXED HYPERLIPIDEMIA: ICD-10-CM

## 2025-03-20 DIAGNOSIS — I10 ESSENTIAL HYPERTENSION: ICD-10-CM

## 2025-03-20 DIAGNOSIS — E11.9 TYPE 2 DIABETES MELLITUS WITHOUT COMPLICATION, WITHOUT LONG-TERM CURRENT USE OF INSULIN: ICD-10-CM

## 2025-03-20 LAB
25(OH)D3 SERPL-MCNC: 51.1 NG/ML
ALBUMIN SERPL-MCNC: 4.4 G/DL (ref 3.5–5.2)
ALP SERPL-CCNC: 103 U/L (ref 40–129)
ALT SERPL-CCNC: 36 U/L (ref 10–50)
ANION GAP SERPL CALCULATED.3IONS-SCNC: 9 MMOL/L (ref 8–16)
AST SERPL-CCNC: 24 U/L (ref 10–50)
BASOPHILS # BLD: 0.1 K/UL (ref 0–0.2)
BASOPHILS NFR BLD: 0.9 % (ref 0–1)
BILIRUB SERPL-MCNC: 0.4 MG/DL (ref 0.2–1.2)
BUN SERPL-MCNC: 16 MG/DL (ref 8–23)
CALCIUM SERPL-MCNC: 10 MG/DL (ref 8.8–10.2)
CHLORIDE SERPL-SCNC: 103 MMOL/L (ref 98–107)
CHOLEST SERPL-MCNC: 144 MG/DL (ref 0–199)
CO2 SERPL-SCNC: 32 MMOL/L (ref 22–29)
CREAT SERPL-MCNC: 0.8 MG/DL (ref 0.7–1.2)
CREAT UR-MCNC: 160 MG/DL (ref 39–259)
EOSINOPHIL # BLD: 0.3 K/UL (ref 0–0.6)
EOSINOPHIL NFR BLD: 4.5 % (ref 0–5)
ERYTHROCYTE [DISTWIDTH] IN BLOOD BY AUTOMATED COUNT: 13.4 % (ref 11.5–14.5)
GLUCOSE SERPL-MCNC: 127 MG/DL (ref 70–99)
HBA1C MFR BLD: 6.7 % (ref 4–5.6)
HCT VFR BLD AUTO: 40.8 % (ref 42–52)
HDLC SERPL-MCNC: 41 MG/DL (ref 40–60)
HGB BLD-MCNC: 13.9 G/DL (ref 14–18)
IMM GRANULOCYTES # BLD: 0 K/UL
LDLC SERPL CALC-MCNC: 85 MG/DL
LYMPHOCYTES # BLD: 2.7 K/UL (ref 1.1–4.5)
LYMPHOCYTES NFR BLD: 36.2 % (ref 20–40)
MCH RBC QN AUTO: 31.9 PG (ref 27–31)
MCHC RBC AUTO-ENTMCNC: 34.1 G/DL (ref 33–37)
MCV RBC AUTO: 93.6 FL (ref 80–94)
MICROALBUMIN UR-MCNC: 8.19 MG/DL (ref 0–1.99)
MICROALBUMIN/CREAT UR-RTO: 51.2 MG/G (ref 0–29)
MONOCYTES # BLD: 0.6 K/UL (ref 0–0.9)
MONOCYTES NFR BLD: 8.6 % (ref 0–10)
NEUTROPHILS # BLD: 3.7 K/UL (ref 1.5–7.5)
NEUTS SEG NFR BLD: 49.5 % (ref 50–65)
PLATELET # BLD AUTO: 200 K/UL (ref 130–400)
PMV BLD AUTO: 10.8 FL (ref 9.4–12.4)
POTASSIUM SERPL-SCNC: 3.8 MMOL/L (ref 3.5–5.1)
PROT SERPL-MCNC: 6.6 G/DL (ref 6.4–8.3)
RBC # BLD AUTO: 4.36 M/UL (ref 4.7–6.1)
SODIUM SERPL-SCNC: 144 MMOL/L (ref 136–145)
TRIGL SERPL-MCNC: 91 MG/DL (ref 0–149)
WBC # BLD AUTO: 7.4 K/UL (ref 4.8–10.8)

## 2025-03-25 ENCOUNTER — RESULTS FOLLOW-UP (OUTPATIENT)
Age: 65
End: 2025-03-25

## 2025-04-04 DIAGNOSIS — G90.521 COMPLEX REGIONAL PAIN SYNDROME I OF RIGHT LOWER LIMB: ICD-10-CM

## 2025-04-04 RX ORDER — GABAPENTIN 300 MG/1
CAPSULE ORAL
Qty: 810 CAPSULE | Refills: 0 | OUTPATIENT
Start: 2025-04-04 | End: 2025-05-02

## 2025-04-28 ENCOUNTER — OFFICE VISIT (OUTPATIENT)
Age: 65
End: 2025-04-28
Payer: COMMERCIAL

## 2025-04-28 VITALS
HEART RATE: 66 BPM | BODY MASS INDEX: 37.48 KG/M2 | SYSTOLIC BLOOD PRESSURE: 130 MMHG | RESPIRATION RATE: 16 BRPM | WEIGHT: 233.2 LBS | DIASTOLIC BLOOD PRESSURE: 84 MMHG | HEIGHT: 66 IN | OXYGEN SATURATION: 97 % | TEMPERATURE: 97.7 F

## 2025-04-28 DIAGNOSIS — G90.521 COMPLEX REGIONAL PAIN SYNDROME I OF RIGHT LOWER LIMB: ICD-10-CM

## 2025-04-28 DIAGNOSIS — R42 VERTIGO: ICD-10-CM

## 2025-04-28 DIAGNOSIS — Z13.6 SCREENING FOR AAA (ABDOMINAL AORTIC ANEURYSM): ICD-10-CM

## 2025-04-28 DIAGNOSIS — H61.23 BILATERAL IMPACTED CERUMEN: ICD-10-CM

## 2025-04-28 DIAGNOSIS — G47.33 OSA (OBSTRUCTIVE SLEEP APNEA): ICD-10-CM

## 2025-04-28 DIAGNOSIS — E11.9 TYPE 2 DIABETES MELLITUS WITHOUT COMPLICATION, WITHOUT LONG-TERM CURRENT USE OF INSULIN (HCC): ICD-10-CM

## 2025-04-28 DIAGNOSIS — E78.2 MIXED HYPERLIPIDEMIA: ICD-10-CM

## 2025-04-28 DIAGNOSIS — I10 ESSENTIAL HYPERTENSION: ICD-10-CM

## 2025-04-28 DIAGNOSIS — Z87.891 PERSONAL HISTORY OF TOBACCO USE: Primary | ICD-10-CM

## 2025-04-28 DIAGNOSIS — M79.89 SWELLING OF BOTH LOWER EXTREMITIES: ICD-10-CM

## 2025-04-28 PROCEDURE — 3075F SYST BP GE 130 - 139MM HG: CPT | Performed by: NURSE PRACTITIONER

## 2025-04-28 PROCEDURE — 3044F HG A1C LEVEL LT 7.0%: CPT | Performed by: NURSE PRACTITIONER

## 2025-04-28 PROCEDURE — 69209 REMOVE IMPACTED EAR WAX UNI: CPT | Performed by: NURSE PRACTITIONER

## 2025-04-28 PROCEDURE — 99214 OFFICE O/P EST MOD 30 MIN: CPT | Performed by: NURSE PRACTITIONER

## 2025-04-28 PROCEDURE — 1123F ACP DISCUSS/DSCN MKR DOCD: CPT | Performed by: NURSE PRACTITIONER

## 2025-04-28 PROCEDURE — G0296 VISIT TO DETERM LDCT ELIG: HCPCS | Performed by: NURSE PRACTITIONER

## 2025-04-28 PROCEDURE — 3079F DIAST BP 80-89 MM HG: CPT | Performed by: NURSE PRACTITIONER

## 2025-04-28 RX ORDER — CHLORTHALIDONE 25 MG/1
25 TABLET ORAL DAILY
Qty: 90 TABLET | Refills: 1 | Status: SHIPPED | OUTPATIENT
Start: 2025-04-28

## 2025-04-28 RX ORDER — POTASSIUM CHLORIDE 1500 MG/1
20 TABLET, EXTENDED RELEASE ORAL DAILY PRN
Qty: 90 TABLET | Refills: 1 | Status: SHIPPED | OUTPATIENT
Start: 2025-04-28

## 2025-04-28 RX ORDER — TERAZOSIN 1 MG/1
1 CAPSULE ORAL NIGHTLY
Qty: 30 CAPSULE | Refills: 3 | Status: CANCELLED | OUTPATIENT
Start: 2025-04-28

## 2025-04-28 RX ORDER — MECLIZINE HYDROCHLORIDE 25 MG/1
25 TABLET ORAL 3 TIMES DAILY PRN
Qty: 90 TABLET | Refills: 1 | Status: SHIPPED | OUTPATIENT
Start: 2025-04-28 | End: 2025-07-27

## 2025-04-28 RX ORDER — FUROSEMIDE 20 MG/1
20 TABLET ORAL DAILY PRN
Qty: 90 TABLET | Refills: 1 | Status: SHIPPED | OUTPATIENT
Start: 2025-04-28

## 2025-04-28 RX ORDER — AMLODIPINE BESYLATE 10 MG/1
10 TABLET ORAL EVERY MORNING
Qty: 90 TABLET | Refills: 1 | Status: SHIPPED | OUTPATIENT
Start: 2025-04-28

## 2025-04-28 SDOH — ECONOMIC STABILITY: FOOD INSECURITY: WITHIN THE PAST 12 MONTHS, THE FOOD YOU BOUGHT JUST DIDN'T LAST AND YOU DIDN'T HAVE MONEY TO GET MORE.: NEVER TRUE

## 2025-04-28 SDOH — ECONOMIC STABILITY: FOOD INSECURITY: WITHIN THE PAST 12 MONTHS, YOU WORRIED THAT YOUR FOOD WOULD RUN OUT BEFORE YOU GOT MONEY TO BUY MORE.: NEVER TRUE

## 2025-04-28 ASSESSMENT — ENCOUNTER SYMPTOMS
GASTROINTESTINAL NEGATIVE: 1
RESPIRATORY NEGATIVE: 1

## 2025-04-29 ENCOUNTER — PATIENT MESSAGE (OUTPATIENT)
Age: 65
End: 2025-04-29

## 2025-05-05 ENCOUNTER — OFFICE VISIT (OUTPATIENT)
Age: 65
End: 2025-05-05
Payer: COMMERCIAL

## 2025-05-05 VITALS
HEART RATE: 77 BPM | HEIGHT: 66 IN | BODY MASS INDEX: 37.45 KG/M2 | DIASTOLIC BLOOD PRESSURE: 68 MMHG | OXYGEN SATURATION: 97 % | RESPIRATION RATE: 16 BRPM | SYSTOLIC BLOOD PRESSURE: 122 MMHG | TEMPERATURE: 97.3 F | WEIGHT: 233 LBS

## 2025-05-05 DIAGNOSIS — H61.23 BILATERAL IMPACTED CERUMEN: ICD-10-CM

## 2025-05-05 DIAGNOSIS — H91.93 HEARING DEFICIT, BILATERAL: Primary | ICD-10-CM

## 2025-05-05 PROCEDURE — 99213 OFFICE O/P EST LOW 20 MIN: CPT | Performed by: NURSE PRACTITIONER

## 2025-05-05 PROCEDURE — 69209 REMOVE IMPACTED EAR WAX UNI: CPT | Performed by: NURSE PRACTITIONER

## 2025-05-05 PROCEDURE — 3078F DIAST BP <80 MM HG: CPT | Performed by: NURSE PRACTITIONER

## 2025-05-05 PROCEDURE — 3074F SYST BP LT 130 MM HG: CPT | Performed by: NURSE PRACTITIONER

## 2025-05-05 PROCEDURE — 1123F ACP DISCUSS/DSCN MKR DOCD: CPT | Performed by: NURSE PRACTITIONER

## 2025-05-05 ASSESSMENT — ENCOUNTER SYMPTOMS: RESPIRATORY NEGATIVE: 1

## 2025-05-05 NOTE — PROGRESS NOTES
SUBJECTIVE:    Patient ID: Ty Salazar is a64 y.o. male.  Ty Salazar is here today for Follow-up (Patient is here today for his 1 week follow up on his ears. Patient states he has been using ear softener. )  .    HPI:   HPI       Ty is here today with his spouse.  He was seen 1 week ago and at that time had bilateral cerumen impaction that was unsuccessfully washed or cleaned with curette.  He has been using a wax softener nightly since that time.  He is here today for reevaluation of decreased hearing and hoping to have this wax removed.    Past Medical History:   Diagnosis Date    Complex regional pain syndrome i of right lower limb 5/30/2018    GERD (gastroesophageal reflux disease)     Hyperlipidemia     Hypertension     Type 2 diabetes, diet controlled ([facility]) 7/26/2024     Prior to Visit Medications    Medication Sig Taking? Authorizing Provider   amLODIPine (NORVASC) 10 MG tablet Take 1 tablet by mouth every morning Yes Leonila Guadalupe APRN   chlorthalidone (HYGROTON) 25 MG tablet Take 1 tablet by mouth daily Yes Leonila Guadalupe APRN   furosemide (LASIX) 20 MG tablet Take 1 tablet by mouth daily as needed (for fluid retention---TAKE Potassium with this) Yes Leonila Guadalupe APRN   potassium chloride (KLOR-CON M) 20 MEQ extended release tablet Take 1 tablet by mouth daily as needed (when taking furosemide) Yes Leonila Guadalupe APRN   meclizine (ANTIVERT) 25 MG tablet Take 1 tablet by mouth 3 times daily as needed for Dizziness Yes Leonila Guadalupe APRN   gabapentin (NEURONTIN) 300 MG capsule TAKE THREE CAPSULES BY MOUTH THREE TIMES DAILY. MAX DAILY DOSE: 2700MG Yes Leonila Guadalupe APRN   metFORMIN (GLUCOPHAGE) 500 MG tablet TAKE 1 TABLET BY MOUTH EVERY DAY FOR 1 WEEK, THEN INCREASE TO TWICE A DAY WITH FOOD Yes Leonila Guadalupe APRN   olmesartan (BENICAR) 40 MG tablet TAKE ONE TABLET BY MOUTH EVERY MORNING Yes Leonila Guadalupe APRN   terazosin (HYTRIN) 1 MG capsule Take 1 capsule by mouth nightly For blood pressure Yes Anayeli

## 2025-06-12 DIAGNOSIS — G90.521 COMPLEX REGIONAL PAIN SYNDROME I OF RIGHT LOWER LIMB: ICD-10-CM

## 2025-06-12 NOTE — TELEPHONE ENCOUNTER
Ty Salazar called to request a refill on his medication.      Last office visit : 5/5/2025   Next office visit : Visit date not found     Last UDS:   Benzodiazepine Screen, Urine   Date Value Ref Range Status   09/23/2024 negative  Final     Buprenorphine Urine   Date Value Ref Range Status   09/23/2024 negative  Final     Cocaine Metabolite Screen, Urine   Date Value Ref Range Status   09/23/2024 negative  Final     Gabapentin Screen, Urine   Date Value Ref Range Status   03/01/2023 not tested  Final     Oxycodone Screen, Ur   Date Value Ref Range Status   09/23/2024 negative  Final     Propoxyphene Screen, Urine   Date Value Ref Range Status   03/01/2023 not tested  Final     THC Screen, Urine   Date Value Ref Range Status   09/23/2024 negative  Final     Tricyclic Antidepressants, Urine   Date Value Ref Range Status   09/23/2024 negative  Final       Last Oswaldo: 6/12/2025  Medication Contract: 9/24/2024   Last Fill: 2/24/2025    Needs UDS as well    Requested Prescriptions     Pending Prescriptions Disp Refills    gabapentin (NEURONTIN) 300 MG capsule [Pharmacy Med Name: Gabapentin Oral Capsule 300  MG  Capsule] 810 capsule 0     Sig: TAKE THREE CAPSULES BY MOUTH THREE TIMES DAILY         Please approve or refuse this medication.   Ania Dennison LPN

## 2025-06-13 RX ORDER — GABAPENTIN 300 MG/1
CAPSULE ORAL
Qty: 810 CAPSULE | Refills: 0 | Status: SHIPPED | OUTPATIENT
Start: 2025-06-13 | End: 2025-09-12

## 2025-07-07 ENCOUNTER — OFFICE VISIT (OUTPATIENT)
Age: 65
End: 2025-07-07
Payer: COMMERCIAL

## 2025-07-07 VITALS
OXYGEN SATURATION: 98 % | BODY MASS INDEX: 37.12 KG/M2 | WEIGHT: 231 LBS | HEIGHT: 66 IN | SYSTOLIC BLOOD PRESSURE: 130 MMHG | DIASTOLIC BLOOD PRESSURE: 82 MMHG | TEMPERATURE: 97 F | RESPIRATION RATE: 16 BRPM | HEART RATE: 67 BPM

## 2025-07-07 DIAGNOSIS — M20.5X1 OVERLAPPING TOE, RIGHT: ICD-10-CM

## 2025-07-07 DIAGNOSIS — E78.2 MIXED HYPERLIPIDEMIA: ICD-10-CM

## 2025-07-07 DIAGNOSIS — Z12.5 PROSTATE CANCER SCREENING: ICD-10-CM

## 2025-07-07 DIAGNOSIS — G90.521 COMPLEX REGIONAL PAIN SYNDROME I OF RIGHT LOWER LIMB: ICD-10-CM

## 2025-07-07 DIAGNOSIS — M79.89 SWELLING OF BOTH LOWER EXTREMITIES: ICD-10-CM

## 2025-07-07 DIAGNOSIS — E11.9 TYPE 2 DIABETES MELLITUS WITHOUT COMPLICATION, WITHOUT LONG-TERM CURRENT USE OF INSULIN (HCC): Primary | ICD-10-CM

## 2025-07-07 DIAGNOSIS — Z02.89 MEDICATION MANAGEMENT CONTRACT AGREEMENT: ICD-10-CM

## 2025-07-07 DIAGNOSIS — I10 ESSENTIAL HYPERTENSION: ICD-10-CM

## 2025-07-07 DIAGNOSIS — E11.9 TYPE 2 DIABETES MELLITUS WITHOUT COMPLICATION, WITHOUT LONG-TERM CURRENT USE OF INSULIN (HCC): ICD-10-CM

## 2025-07-07 LAB
ALBUMIN SERPL-MCNC: 4.4 G/DL (ref 3.5–5.2)
ALP SERPL-CCNC: 104 U/L (ref 40–129)
ALT SERPL-CCNC: 22 U/L (ref 10–50)
AMPHET UR QL SCN: NEGATIVE
ANION GAP SERPL CALCULATED.3IONS-SCNC: 13 MMOL/L (ref 8–16)
AST SERPL-CCNC: 22 U/L (ref 10–50)
BARBITURATES UR QL SCN: NEGATIVE
BASOPHILS # BLD: 0.1 K/UL (ref 0–0.2)
BASOPHILS NFR BLD: 1.3 % (ref 0–1)
BENZODIAZ UR QL SCN: NEGATIVE
BILIRUB SERPL-MCNC: 0.4 MG/DL (ref 0.2–1.2)
BUN SERPL-MCNC: 15 MG/DL (ref 8–23)
BUPRENORPHINE URINE: NEGATIVE
CALCIUM SERPL-MCNC: 9.7 MG/DL (ref 8.8–10.2)
CANNABINOIDS UR QL SCN: NEGATIVE
CHLORIDE SERPL-SCNC: 103 MMOL/L (ref 98–107)
CO2 SERPL-SCNC: 27 MMOL/L (ref 22–29)
COCAINE UR QL SCN: NEGATIVE
CREAT SERPL-MCNC: 0.8 MG/DL (ref 0.7–1.2)
CREAT UR-MCNC: 286 MG/DL (ref 39–259)
DRUG SCREEN COMMENT UR-IMP: NORMAL
EOSINOPHIL # BLD: 0.2 K/UL (ref 0–0.6)
EOSINOPHIL NFR BLD: 3.2 % (ref 0–5)
ERYTHROCYTE [DISTWIDTH] IN BLOOD BY AUTOMATED COUNT: 12.9 % (ref 11.5–14.5)
FENTANYL SCREEN, URINE: NEGATIVE
GLUCOSE SERPL-MCNC: 99 MG/DL (ref 70–99)
HBA1C MFR BLD: 6.8 % (ref 4–5.6)
HCT VFR BLD AUTO: 41.3 % (ref 42–52)
HGB BLD-MCNC: 13.9 G/DL (ref 14–18)
IMM GRANULOCYTES # BLD: 0 K/UL
LYMPHOCYTES # BLD: 2.6 K/UL (ref 1.1–4.5)
LYMPHOCYTES NFR BLD: 38.4 % (ref 20–40)
Lab: NORMAL
MCH RBC QN AUTO: 31.6 PG (ref 27–31)
MCHC RBC AUTO-ENTMCNC: 33.7 G/DL (ref 33–37)
MCV RBC AUTO: 93.9 FL (ref 80–94)
METHADONE UR QL SCN: NEGATIVE
METHAMPHETAMINE, URINE: NEGATIVE
MICROALBUMIN UR-MCNC: 17.7 MG/DL (ref 0–1.99)
MICROALBUMIN/CREAT UR-RTO: 61.9 MG/G (ref 0–29)
MONOCYTES # BLD: 0.6 K/UL (ref 0–0.9)
MONOCYTES NFR BLD: 8.2 % (ref 0–10)
NEUTROPHILS # BLD: 3.3 K/UL (ref 1.5–7.5)
NEUTS SEG NFR BLD: 48.5 % (ref 50–65)
OPIATES UR QL SCN: NEGATIVE
OXYCODONE UR QL SCN: NEGATIVE
PCP UR QL SCN: NEGATIVE
PLATELET # BLD AUTO: 200 K/UL (ref 130–400)
PMV BLD AUTO: 11.2 FL (ref 9.4–12.4)
POTASSIUM SERPL-SCNC: 3.3 MMOL/L (ref 3.5–5.1)
PROT SERPL-MCNC: 6.7 G/DL (ref 6.4–8.3)
PSA SERPL-MCNC: 0.45 NG/ML (ref 0–4)
RBC # BLD AUTO: 4.4 M/UL (ref 4.7–6.1)
REPORT: NORMAL
SODIUM SERPL-SCNC: 143 MMOL/L (ref 136–145)
THIS TEST SENT TO: NORMAL
TRICYCLIC ANTIDEPRESSANTS, UR: NEGATIVE
WBC # BLD AUTO: 6.9 K/UL (ref 4.8–10.8)

## 2025-07-07 PROCEDURE — 3044F HG A1C LEVEL LT 7.0%: CPT | Performed by: NURSE PRACTITIONER

## 2025-07-07 PROCEDURE — 3075F SYST BP GE 130 - 139MM HG: CPT | Performed by: NURSE PRACTITIONER

## 2025-07-07 PROCEDURE — 3079F DIAST BP 80-89 MM HG: CPT | Performed by: NURSE PRACTITIONER

## 2025-07-07 PROCEDURE — 1123F ACP DISCUSS/DSCN MKR DOCD: CPT | Performed by: NURSE PRACTITIONER

## 2025-07-07 PROCEDURE — 99214 OFFICE O/P EST MOD 30 MIN: CPT | Performed by: NURSE PRACTITIONER

## 2025-07-07 RX ORDER — OLMESARTAN MEDOXOMIL 40 MG/1
40 TABLET ORAL EVERY MORNING
Qty: 30 TABLET | Refills: 0 | Status: SHIPPED | OUTPATIENT
Start: 2025-07-07

## 2025-07-07 RX ORDER — GABAPENTIN 300 MG/1
CAPSULE ORAL
Qty: 1080 CAPSULE | Refills: 0 | Status: SHIPPED | OUTPATIENT
Start: 2025-07-07 | End: 2025-10-06

## 2025-07-07 RX ORDER — FUROSEMIDE 20 MG/1
20 TABLET ORAL DAILY PRN
Qty: 30 TABLET | Refills: 1 | Status: SHIPPED | OUTPATIENT
Start: 2025-07-07

## 2025-07-07 RX ORDER — OLMESARTAN MEDOXOMIL 40 MG/1
40 TABLET ORAL EVERY MORNING
Qty: 30 TABLET | Refills: 0 | Status: SHIPPED | OUTPATIENT
Start: 2025-07-07 | End: 2025-07-07 | Stop reason: SDUPTHER

## 2025-07-07 RX ORDER — POTASSIUM CHLORIDE 1500 MG/1
20 TABLET, EXTENDED RELEASE ORAL DAILY
Qty: 90 TABLET | Refills: 1 | Status: SHIPPED | OUTPATIENT
Start: 2025-07-07

## 2025-07-07 RX ORDER — ATORVASTATIN CALCIUM 40 MG/1
TABLET, FILM COATED ORAL
Qty: 15 TABLET | Refills: 0 | Status: SHIPPED | OUTPATIENT
Start: 2025-07-07 | End: 2025-07-07 | Stop reason: SDUPTHER

## 2025-07-07 RX ORDER — POTASSIUM CHLORIDE 1500 MG/1
20 TABLET, EXTENDED RELEASE ORAL DAILY PRN
Qty: 30 TABLET | Refills: 1 | Status: SHIPPED | OUTPATIENT
Start: 2025-07-07

## 2025-07-07 RX ORDER — ATORVASTATIN CALCIUM 40 MG/1
TABLET, FILM COATED ORAL
Qty: 45 TABLET | Refills: 1 | Status: SHIPPED | OUTPATIENT
Start: 2025-07-07

## 2025-07-07 RX ORDER — OLMESARTAN MEDOXOMIL 40 MG/1
40 TABLET ORAL EVERY MORNING
Qty: 90 TABLET | Refills: 1 | Status: SHIPPED | OUTPATIENT
Start: 2025-07-07

## 2025-07-07 RX ORDER — FUROSEMIDE 20 MG/1
20 TABLET ORAL DAILY PRN
Qty: 30 TABLET | Refills: 1 | Status: SHIPPED | OUTPATIENT
Start: 2025-07-07 | End: 2025-07-07 | Stop reason: SDUPTHER

## 2025-07-07 RX ORDER — GABAPENTIN 300 MG/1
CAPSULE ORAL
Qty: 810 CAPSULE | Refills: 0 | Status: CANCELLED | OUTPATIENT
Start: 2025-07-07 | End: 2025-10-06

## 2025-07-07 RX ORDER — ATORVASTATIN CALCIUM 40 MG/1
TABLET, FILM COATED ORAL
Qty: 15 TABLET | Refills: 0 | Status: SHIPPED | OUTPATIENT
Start: 2025-07-07

## 2025-07-07 RX ORDER — FUROSEMIDE 20 MG/1
20 TABLET ORAL DAILY PRN
Qty: 90 TABLET | Refills: 1 | Status: SHIPPED | OUTPATIENT
Start: 2025-07-07

## 2025-07-07 ASSESSMENT — ENCOUNTER SYMPTOMS
TROUBLE SWALLOWING: 0
RESPIRATORY NEGATIVE: 1

## 2025-07-07 NOTE — PROGRESS NOTES
Year: 0     Homeless in the Last Year: No       Review of Systems   Constitutional: Negative.    HENT:  Negative for trouble swallowing.    Respiratory: Negative.     Cardiovascular: Negative.    Neurological: Negative.    Psychiatric/Behavioral: Negative.         OBJECTIVE:  Physical Exam  - Respiratory: Clear to auscultation, no wheezing, rales or rhonchi  - Gastrointestinal: Soft, no tenderness, no distention, no masses  - Extremities: 1+ pitting edema in the left leg     Physical Exam  Vitals and nursing note reviewed.   Constitutional:       General: He is not in acute distress.     Appearance: Normal appearance. He is well-developed. He is not ill-appearing or toxic-appearing.   HENT:      Head: Normocephalic and atraumatic.      Right Ear: Tympanic membrane and external ear normal. There is no impacted cerumen.      Left Ear: Tympanic membrane and external ear normal. There is no impacted cerumen.      Nose: Nose normal. No congestion.      Mouth/Throat:      Mouth: Mucous membranes are moist.      Pharynx: No oropharyngeal exudate or posterior oropharyngeal erythema.   Eyes:      Extraocular Movements: Extraocular movements intact.      Conjunctiva/sclera: Conjunctivae normal.      Pupils: Pupils are equal, round, and reactive to light.   Neck:      Vascular: No carotid bruit.      Trachea: No tracheal deviation.   Cardiovascular:      Rate and Rhythm: Normal rate and regular rhythm.      Heart sounds: Normal heart sounds. No murmur heard.  Pulmonary:      Effort: Pulmonary effort is normal.      Breath sounds: Normal breath sounds.   Abdominal:      General: Bowel sounds are normal.      Palpations: Abdomen is soft.   Musculoskeletal:      Cervical back: Neck supple. No rigidity.      Right lower leg: No edema.      Left lower leg: No edema.   Lymphadenopathy:      Cervical: No cervical adenopathy.   Skin:     General: Skin is warm and dry.      Capillary Refill: Capillary refill takes less than 2 seconds.

## 2025-07-09 LAB
AMPHET CTO UR CFM-MCNC: NEGATIVE NG/ML
BARBITURATES CTO UR CFM-MCNC: NEGATIVE NG/ML
BENZODIAZ CTO UR CFM-MCNC: NEGATIVE NG/ML
BUPRENORPHINE UR QL SCN: NEGATIVE NG/ML
CANNABINOIDS CTO UR CFM-MCNC: NEGATIVE NG/ML
CARISOPRODOL UR QL: NEGATIVE NG/ML
COCAINE CTO UR CFM-MCNC: NEGATIVE NG/ML
CREAT UR-MCNC: 284.5 MG/DL (ref 20–400)
DRUG SCREEN COMMENT UR-IMP: NORMAL
ETHYL GLUCURONIDE UR QL SCN: NEGATIVE NG/ML
FENTANYL UR QL: NEGATIVE NG/ML
MEPERIDINE UR QL: NEGATIVE NG/ML
METHADONE CTO UR CFM-MCNC: NEGATIVE NG/ML
MISCELLANEOUS LAB TEST ORDER: NORMAL
OPIATES UR QL SCN: NEGATIVE NG/ML
OXYCODONE SCREEN URINE: NEGATIVE NG/ML
PCP CTO UR CFM-MCNC: NEGATIVE NG/ML
PROPOXYPH CTO UR CFM-MCNC: NEGATIVE NG/ML
TAPENTADOL UR QL SCN: NEGATIVE NG/ML
TRAMADOL SCREEN URINE: NEGATIVE NG/ML
ZOLPIDEM UR QL SCN: NEGATIVE NG/ML

## 2025-07-17 RX ORDER — POTASSIUM CHLORIDE 1500 MG/1
20 TABLET, EXTENDED RELEASE ORAL 2 TIMES DAILY
Qty: 180 TABLET | Refills: 1 | Status: SHIPPED | OUTPATIENT
Start: 2025-07-17

## 2025-08-05 DIAGNOSIS — I10 ESSENTIAL HYPERTENSION: ICD-10-CM

## 2025-08-06 RX ORDER — AMLODIPINE BESYLATE 10 MG/1
10 TABLET ORAL EVERY MORNING
Qty: 90 TABLET | Refills: 1 | Status: SHIPPED | OUTPATIENT
Start: 2025-08-06

## (undated) DEVICE — FORCEPS BX L240CM DIA2.4MM L NDL RAD JAW 4 133340

## (undated) DEVICE — ENDO KIT,LOURDES HOSPITAL: Brand: MEDLINE INDUSTRIES, INC.